# Patient Record
Sex: FEMALE | Race: WHITE | Employment: PART TIME | URBAN - METROPOLITAN AREA
[De-identification: names, ages, dates, MRNs, and addresses within clinical notes are randomized per-mention and may not be internally consistent; named-entity substitution may affect disease eponyms.]

---

## 2017-01-08 NOTE — PROGRESS NOTES
[facility] Progress Note      Patient Name: Sofya Bautista   YOB: 1970  Medical Record Number: [id number]  Consulting Physician: Vladimir Mcwilliams M.D.        Date of Visit: 1/9/2017      Chief Complaint  Invasive ductal carcinoma, ri severe depression that began with the start of tamoxifen. History of Present Illness  Patient returns for follow up. She states that within two days of discontinuing tamoxifen, her mood improved dramatically.  She feels that she has returned to her base x 3; motor and sensory grossly intact. Psychiatric  Mood and affect appropriate; coherent speech; verbalizes understanding of our discussions today. Impression and Plan   1. Invasive ductal carcinoma, right breast: Patient is staged [id number].  Ravi

## 2017-01-09 ENCOUNTER — OFFICE VISIT (OUTPATIENT)
Dept: HEMATOLOGY/ONCOLOGY | Facility: HOSPITAL | Age: 47
End: 2017-01-09
Attending: SPECIALIST
Payer: COMMERCIAL

## 2017-01-09 VITALS
DIASTOLIC BLOOD PRESSURE: 56 MMHG | BODY MASS INDEX: 22.91 KG/M2 | RESPIRATION RATE: 18 BRPM | WEIGHT: 151.19 LBS | SYSTOLIC BLOOD PRESSURE: 111 MMHG | OXYGEN SATURATION: 99 % | TEMPERATURE: 98 F | HEIGHT: 67.99 IN | HEART RATE: 77 BPM

## 2017-01-09 DIAGNOSIS — C50.811 MALIGNANT NEOPLASM OF OVERLAPPING SITES OF RIGHT FEMALE BREAST (HCC): Primary | ICD-10-CM

## 2017-01-09 PROCEDURE — 99215 OFFICE O/P EST HI 40 MIN: CPT | Performed by: SPECIALIST

## 2017-01-09 NOTE — PROGRESS NOTES
Patient is here today for follow up with Dr. Larry Dudley. Patient denies pain. Stated depression is much better since stopping Tamoxifen. Medication list and medical history were reviewed and updated.       Education Record    Learner:  Patient    Disease / Mariaelena

## 2017-04-16 NOTE — PROGRESS NOTES
[facility] Progress Note      Patient Name: Willian Coppola   YOB: 1970  Medical Record Number: [id number]  Consulting Physician: Fabian Arellano M.D.        Date of Visit: 4/17/2017      Chief Complaint  Invasive ductal carcinoma, r to severe depression that began with the start of tamoxifen. History of Present Illness  Patient returns for follow up. She feels well. She denies recurrence of the severe depression that resulted in discontinuation of tamoxifen.  She denies any self pa External nose normal; external ears normal.  Neck                   Supple, without masses; no thyromegaly. Hematologic/Lymphatic No cervical, supraclavicular, axillary lymphadenopathy. Chest                         Symmetric; no masses.   Br

## 2017-04-17 ENCOUNTER — OFFICE VISIT (OUTPATIENT)
Dept: HEMATOLOGY/ONCOLOGY | Facility: HOSPITAL | Age: 47
End: 2017-04-17
Attending: SPECIALIST
Payer: COMMERCIAL

## 2017-04-17 VITALS
HEIGHT: 67.99 IN | SYSTOLIC BLOOD PRESSURE: 120 MMHG | RESPIRATION RATE: 18 BRPM | HEART RATE: 69 BPM | DIASTOLIC BLOOD PRESSURE: 72 MMHG | WEIGHT: 148.5 LBS | TEMPERATURE: 97 F | BODY MASS INDEX: 22.51 KG/M2 | OXYGEN SATURATION: 98 %

## 2017-04-17 DIAGNOSIS — C50.811 MALIGNANT NEOPLASM OF OVERLAPPING SITES OF RIGHT FEMALE BREAST (HCC): Primary | ICD-10-CM

## 2017-04-17 PROCEDURE — 99213 OFFICE O/P EST LOW 20 MIN: CPT | Performed by: SPECIALIST

## 2017-04-17 NOTE — PROGRESS NOTES
Patient is here today for follow up with Bryce Check for breast cancer. Patient denies pain. Medication list and medical history were reviewed and updated.     Education Record    Learner:  Patient    Disease / Diagnosis: breast cancer    Francisco / Jodene Nyhan

## 2017-07-16 NOTE — PROGRESS NOTES
[facility] Progress Note      Patient Name: Ramona Sellers   YOB: 1970  Medical Record Number: [id number]  Consulting Physician: Naya Cunningham M.D.        Date of Visit: 7/17/2017      Chief Complaint  Invasive ductal carcinoma, r to severe depression that began with the start of tamoxifen. History of Present Illness  Patient returns for follow up. She feels well. She denies recurrence of the severe depression that resulted in discontinuation of tamoxifen.  She denies any self pa anicteric. ENMT                 External nose normal; external ears normal.  Neck                   Supple, without masses; no thyromegaly. Hematologic/Lymphatic No cervical, supraclavicular, axillary lymphadenopathy.   Chest                         Symme

## 2017-07-17 ENCOUNTER — OFFICE VISIT (OUTPATIENT)
Dept: HEMATOLOGY/ONCOLOGY | Facility: HOSPITAL | Age: 47
End: 2017-07-17
Attending: SPECIALIST
Payer: COMMERCIAL

## 2017-07-17 VITALS
TEMPERATURE: 96 F | OXYGEN SATURATION: 98 % | SYSTOLIC BLOOD PRESSURE: 108 MMHG | HEIGHT: 67.99 IN | HEART RATE: 59 BPM | DIASTOLIC BLOOD PRESSURE: 62 MMHG | WEIGHT: 150.19 LBS | RESPIRATION RATE: 18 BRPM | BODY MASS INDEX: 22.76 KG/M2

## 2017-07-17 DIAGNOSIS — Z17.0 MALIGNANT NEOPLASM OF OVERLAPPING SITES OF RIGHT BREAST IN FEMALE, ESTROGEN RECEPTOR POSITIVE (HCC): ICD-10-CM

## 2017-07-17 DIAGNOSIS — C50.811 MALIGNANT NEOPLASM OF OVERLAPPING SITES OF RIGHT BREAST IN FEMALE, ESTROGEN RECEPTOR POSITIVE (HCC): ICD-10-CM

## 2017-07-17 DIAGNOSIS — I89.0 LYMPHEDEMA OF RIGHT ARM: Primary | ICD-10-CM

## 2017-07-17 PROCEDURE — 99215 OFFICE O/P EST HI 40 MIN: CPT | Performed by: SPECIALIST

## 2017-07-17 NOTE — PROGRESS NOTES
Patient is here today for follow up with Dr Gokul Rivera for breast cancer. Patient denies pain. Medication list and medical history were reviewed and updated.     Education Record    Learner:  Patient    Disease / Diagnosis: breast cancer    Francisco / Carla Luis

## 2017-08-28 ENCOUNTER — HOSPITAL ENCOUNTER (OUTPATIENT)
Dept: OCCUPATIONAL MEDICINE | Facility: HOSPITAL | Age: 47
Setting detail: THERAPIES SERIES
Discharge: HOME | End: 2017-08-28
Attending: SPECIALIST
Payer: COMMERCIAL

## 2017-08-28 DIAGNOSIS — I97.2 LYMPHEDEMA SYNDROME, POSTMASTECTOMY: Primary | ICD-10-CM

## 2017-08-28 PROCEDURE — 97166 OT EVAL MOD COMPLEX 45 MIN: CPT

## 2017-08-28 PROCEDURE — 97535 SELF CARE MNGMENT TRAINING: CPT

## 2017-08-28 NOTE — PROGRESS NOTES
OCCUPATIONAL THERAPY UE LYMPHEDEMA EVALUATION:   Referring Physician: Dr. Antonio Miner  Diagnosis: Lymphedema of right arm (I89.0) Date of Service: 8/28/2017     PATIENT Maxi Paige is a 56year old y/o female who presents s/p bilateral mastecto of response to use of compression sleeve; instruction in self-manual lymph drainage techniques for home self-management of her lymphedema.  At today's session, she was provided with an authorized prescription for a compression sleeve; information on where t of lymphedema risk reduction techniques. 3 sessions   3.  Pt will obtain good fitting Right UE compression sleeve and demonstrate good understanding of use, care and replacement of garment/s. 3 sessions      Frequency / Duration: Patient to return on 9/13 a

## 2017-09-13 ENCOUNTER — HOSPITAL ENCOUNTER (OUTPATIENT)
Dept: OCCUPATIONAL MEDICINE | Facility: HOSPITAL | Age: 47
Setting detail: THERAPIES SERIES
Discharge: HOME | End: 2017-09-13
Attending: SPECIALIST
Payer: COMMERCIAL

## 2017-09-13 PROCEDURE — 97140 MANUAL THERAPY 1/> REGIONS: CPT

## 2017-09-13 PROCEDURE — 97535 SELF CARE MNGMENT TRAINING: CPT

## 2017-09-13 PROCEDURE — 97760 ORTHOTIC MGMT&TRAING 1ST ENC: CPT

## 2017-09-13 NOTE — PROGRESS NOTES
Dx: Lymphedema of right arm (I89.0)         Authorized # of Visits:  2/3        Next MD visit/plan renewal:  9/28/2017  Fall Risk: Standard          Precautions:  Lymphedema        Subjective:   *Pt reports she obtained and used Right compression sleeve fo sleeve while walking her dogs. Advised her to use self-reflection if discomfort occurs in her Right UE to determine potential source of exacerbation as well as to think pro-actively about whether she should be using her sleeve for an IADL.  Pt acknowledging

## 2017-09-27 ENCOUNTER — HOSPITAL ENCOUNTER (OUTPATIENT)
Dept: OCCUPATIONAL MEDICINE | Facility: HOSPITAL | Age: 47
Setting detail: THERAPIES SERIES
Discharge: HOME | End: 2017-09-27
Attending: SPECIALIST
Payer: COMMERCIAL

## 2017-09-27 PROCEDURE — 97140 MANUAL THERAPY 1/> REGIONS: CPT

## 2017-09-27 NOTE — PROGRESS NOTES
Discharge Summary    Pt has attended 3/3, cancelled 0, and no shown 0 visits in Occupational Therapy from 8/28-9/27/2017. Subjective:   Pt reports resolution of Right axillary/UE \"achiness\" following exercise activities.  Since last seen, reports she risk reduction, discussed that pt's current approach is appropriate at this time; she states she is not planning to return to classes that involve heavy weights.  Reviewed signs/symptoms of lymphedema   GARMENT SPECIFICS:  Mediven [place], 20-30 mmHg estella

## 2017-12-27 PROCEDURE — 87253 VIRUS INOCULATE TISSUE ADDL: CPT | Performed by: FAMILY MEDICINE

## 2017-12-27 PROCEDURE — 87660 TRICHOMONAS VAGIN DIR PROBE: CPT | Performed by: FAMILY MEDICINE

## 2017-12-27 PROCEDURE — 87252 VIRUS INOCULATION TISSUE: CPT | Performed by: FAMILY MEDICINE

## 2017-12-27 PROCEDURE — 87480 CANDIDA DNA DIR PROBE: CPT | Performed by: FAMILY MEDICINE

## 2017-12-27 PROCEDURE — 87510 GARDNER VAG DNA DIR PROBE: CPT | Performed by: FAMILY MEDICINE

## 2018-08-23 PROBLEM — F41.1 ANXIETY STATE: Status: ACTIVE | Noted: 2018-08-23

## 2018-09-20 PROBLEM — G47.00 INSOMNIA: Status: ACTIVE | Noted: 2018-09-20

## 2019-01-31 ENCOUNTER — SOCIAL WORK SERVICES (OUTPATIENT)
Dept: HEMATOLOGY/ONCOLOGY | Facility: HOSPITAL | Age: 49
End: 2019-01-31

## 2019-01-31 NOTE — PROGRESS NOTES
Patient is here today for follow up with [facility] for breast cancer. Patient denies pain. Medication list and medical history were reviewed and updated.      Education Record    Learner:  Patient    Disease / Diagnosis: breast cancer    Barriers / Bekraig Chi

## 2019-01-31 NOTE — PROGRESS NOTES
ZACH met with patient at the request of MD.  Patient is experiencing depression while taking medication prescribed by PCP and psychiatric APN.     She relates that she has a history of depression and nothing she has been taking has improved her mood to a poin

## 2019-01-31 NOTE — PROGRESS NOTES
[facility] Progress Note      Patient Name: Brian Summers   YOB: 1970  Medical Record Number: [id number]  Consulting Physician: Dereck Hernandes M.D.        Date of Visit: 1/31/2019      Chief Complaint  Invasive ductal carcinoma, r to severe depression that began with the start of tamoxifen. Patient failed to follow up between 07/2017 and 01/2019. History of Present Illness  Patient returns for follow up. She feels well. She denies any self palpated chest wall masses.  She has 22.88 kg/m²     Physical Examination   Constitutional      Well developed, well nourished. Appears close to chronological age. No apparent distress. Head   Normocephalic and atraumatic. Eyes   Conjunctiva clear; sclera anicteric.   ENMT                 E

## 2019-02-12 ENCOUNTER — SOCIAL WORK SERVICES (OUTPATIENT)
Dept: HEMATOLOGY/ONCOLOGY | Facility: HOSPITAL | Age: 49
End: 2019-02-12

## 2019-02-13 ENCOUNTER — SOCIAL WORK SERVICES (OUTPATIENT)
Dept: HEMATOLOGY/ONCOLOGY | Facility: HOSPITAL | Age: 49
End: 2019-02-13

## 2019-02-13 NOTE — PROGRESS NOTES
Patient left  for ZACH at [facility] 2/12 stating that she would try to reach SW tomorrow. ZACH will give her name and contact info for [facility] when we are able to speak.

## 2019-04-23 ENCOUNTER — HOSPITAL ENCOUNTER (OUTPATIENT)
Dept: GENERAL RADIOLOGY | Facility: HOSPITAL | Age: 49
Discharge: HOME | End: 2019-04-23
Attending: INTERNAL MEDICINE
Payer: COMMERCIAL

## 2019-04-23 ENCOUNTER — LAB ENCOUNTER (OUTPATIENT)
Dept: LAB | Facility: HOSPITAL | Age: 49
End: 2019-04-23
Attending: INTERNAL MEDICINE
Payer: COMMERCIAL

## 2019-04-23 DIAGNOSIS — M54.50 CHRONIC MIDLINE LOW BACK PAIN WITHOUT SCIATICA: ICD-10-CM

## 2019-04-23 DIAGNOSIS — F41.0 PANIC ATTACK: ICD-10-CM

## 2019-04-23 DIAGNOSIS — G89.29 CHRONIC MIDLINE LOW BACK PAIN WITHOUT SCIATICA: ICD-10-CM

## 2019-04-23 PROBLEM — F39 MOOD DISORDER (HCC): Status: ACTIVE | Noted: 2019-04-23

## 2019-04-23 PROBLEM — E78.2 MIXED HYPERLIPIDEMIA: Status: ACTIVE | Noted: 2019-04-23

## 2019-04-23 PROBLEM — R79.89 HIGH SERUM LOW-DENSITY LIPOPROTEIN (LDL): Status: ACTIVE | Noted: 2019-04-23

## 2019-04-23 PROBLEM — F39 MOOD DISORDER: Status: ACTIVE | Noted: 2019-04-23

## 2019-04-23 PROCEDURE — 72114 X-RAY EXAM L-S SPINE BENDING: CPT | Performed by: INTERNAL MEDICINE

## 2019-04-23 PROCEDURE — 84443 ASSAY THYROID STIM HORMONE: CPT

## 2019-04-23 PROCEDURE — 84439 ASSAY OF FREE THYROXINE: CPT

## 2019-04-23 PROCEDURE — 84450 TRANSFERASE (AST) (SGOT): CPT

## 2019-04-23 PROCEDURE — 85025 COMPLETE CBC W/AUTO DIFF WBC: CPT

## 2019-04-23 PROCEDURE — 80061 LIPID PANEL: CPT

## 2019-04-23 PROCEDURE — 80048 BASIC METABOLIC PNL TOTAL CA: CPT

## 2019-04-23 PROCEDURE — 84460 ALANINE AMINO (ALT) (SGPT): CPT

## 2019-04-23 PROCEDURE — 36415 COLL VENOUS BLD VENIPUNCTURE: CPT

## 2019-04-23 NOTE — PROGRESS NOTES
Hudson Disla is a 53year old female. HPI:   Patient presents for the following issues. We will re-scheudle her CPX. 1. MDD and Anxiety: she was following with Archana Marques (psychiatrist [facility]).  She had been provided with other psychiatrists but she daily a few miles. None of this helps her pain. pilates has helped a little. 3. Umbilical Hernia: repaired in 2014 by Hermann Smith. She decliend mesh at that time. It has become progressvely painful and is poking out. She wants to return to see Hermann Smith.        REV Performed by Erick Ramos MD at [address] [facility]   • GRAFTING FAT BREAST Bilateral 9/30/2016    Performed by Erick Ramos MD at [facility] N/A 3/5/2014    Performed by Clarence Caceres and then try to decrease to every other day prn. She does not want to be on long term benzos but may need to wait until psychiatry is able to help with medication management.   - I will temporarily refill her medications if her labs are normal.   Sarah Silva

## 2019-04-23 NOTE — PATIENT INSTRUCTIONS
[address] [facility] [place] Specialist is Jace Gurwinder. Phone: [phone]. Please reach out to her if you do not hear from her in the next 3 days.

## 2019-04-24 PROBLEM — F12.90 MARIJUANA USE, EPISODIC: Status: ACTIVE | Noted: 2019-04-24

## 2019-04-24 NOTE — TELEPHONE ENCOUNTER
Patient was in for [address] yesterday and was instructed to complete blood work prior to getting refill for medication. Requesting refill for:  escitalopram 10 MG Oral Tab  Lurasidone HCl (LATUDA) 20 MG Oral Tab    [facility] - [address] [place] [address] -  KAIDEN Carmen

## 2019-04-25 RX ORDER — ESCITALOPRAM OXALATE 10 MG/1
10 TABLET ORAL DAILY
Refills: 0 | OUTPATIENT
Start: 2019-04-25

## 2019-04-25 NOTE — TELEPHONE ENCOUNTER
Latuda 20 mg 1 tab daily filled 12/20/18 30 with 0 refills     Escitalopram 10 mg 1 tab daily filled 12/20/18 30 with 2 refills     LOV 4/23/19    return in 6 weeks for CPX. # Mood Disorder and Panic attacks: not controlled.  ran out of lexapro 3 weeks a

## 2019-04-26 RX ORDER — CLONAZEPAM 0.5 MG/1
0.5 TABLET ORAL NIGHTLY PRN
Qty: 30 TABLET | Refills: 0 | Status: SHIPPED | OUTPATIENT
Start: 2019-04-26 | End: 2019-07-22

## 2019-04-26 RX ORDER — ESCITALOPRAM OXALATE 10 MG/1
10 TABLET ORAL DAILY
Qty: 30 TABLET | Refills: 1 | Status: SHIPPED | OUTPATIENT
Start: 2019-04-26 | End: 2019-07-08

## 2019-05-07 ENCOUNTER — OFFICE VISIT (OUTPATIENT)
Dept: SURGERY | Facility: CLINIC | Age: 49
End: 2019-05-07
Payer: COMMERCIAL

## 2019-05-07 VITALS
RESPIRATION RATE: 20 BRPM | BODY MASS INDEX: 22.58 KG/M2 | WEIGHT: 149 LBS | TEMPERATURE: 98 F | HEIGHT: 68 IN | SYSTOLIC BLOOD PRESSURE: 117 MMHG | HEART RATE: 74 BPM | DIASTOLIC BLOOD PRESSURE: 76 MMHG

## 2019-05-07 DIAGNOSIS — K42.9 UMBILICAL HERNIA WITHOUT OBSTRUCTION AND WITHOUT GANGRENE: Primary | ICD-10-CM

## 2019-05-07 DIAGNOSIS — K43.2 RECURRENT VENTRAL HERNIA: ICD-10-CM

## 2019-05-07 PROCEDURE — 99244 OFF/OP CNSLTJ NEW/EST MOD 40: CPT | Performed by: SURGERY

## 2019-05-08 NOTE — PROGRESS NOTES
Follow Up Visit Note       Active Problems      1. Umbilical hernia without obstruction and without gangrene    2.  Ventral hernia without obstruction or gangrene          Chief Complaint   Patient presents with:  Hernia: New patient referred by Dr. Wilver Huynh fo •  escitalopram 10 MG Oral Tab, Take 1 tablet (10 mg total) by mouth daily. , Disp: 30 tablet, Rfl: 1  •  Lurasidone HCl (LATUDA) 20 MG Oral Tab, Take 1 tablet (20 mg total) by mouth daily with breakfast., Disp: 30 tablet, Rfl: 1  •  clonazePAM 0.5 MG Oral Musculoskeletal: Normal range of motion. She exhibits no deformity. Neurological: She is alert and oriented to person, place, and time. Skin: Skin is warm and dry. No rash noted. The abdomen is soft, nondistended, nontender.   Well-healed supraumbil

## 2019-06-19 PROBLEM — F41.1 GENERALIZED ANXIETY DISORDER: Status: ACTIVE | Noted: 2019-06-19

## 2019-07-12 RX ORDER — LURASIDONE HYDROCHLORIDE 20 MG/1
TABLET, FILM COATED ORAL
Qty: 30 TABLET | Refills: 0 | Status: SHIPPED | OUTPATIENT
Start: 2019-07-12 | End: 2019-07-25

## 2019-07-12 RX ORDER — ESCITALOPRAM OXALATE 10 MG/1
10 TABLET ORAL DAILY
Qty: 30 TABLET | Refills: 0 | Status: SHIPPED | OUTPATIENT
Start: 2019-07-12 | End: 2019-07-25

## 2019-07-12 NOTE — TELEPHONE ENCOUNTER
escitalopram  Last OV relevant to medication: 4-23-19  Last refill date: 4-26-19  #/refills: 1  When pt was asked to return for OV: 6 Wks  CPX  Upcoming appt/reason: 7-22-19  Recent labs: none    Latuda  Last OV relevant to medication: 4-23-19  Last refill

## 2019-07-22 ENCOUNTER — OFFICE VISIT (OUTPATIENT)
Dept: INTERNAL MEDICINE CLINIC | Facility: CLINIC | Age: 49
End: 2019-07-22
Payer: COMMERCIAL

## 2019-07-22 VITALS
WEIGHT: 147.5 LBS | OXYGEN SATURATION: 99 % | HEIGHT: 67.75 IN | SYSTOLIC BLOOD PRESSURE: 128 MMHG | BODY MASS INDEX: 22.61 KG/M2 | DIASTOLIC BLOOD PRESSURE: 64 MMHG | RESPIRATION RATE: 16 BRPM | HEART RATE: 75 BPM | TEMPERATURE: 98 F

## 2019-07-22 DIAGNOSIS — M54.50 ACUTE MIDLINE LOW BACK PAIN WITHOUT SCIATICA: ICD-10-CM

## 2019-07-22 DIAGNOSIS — Z12.4 SCREENING FOR CERVICAL CANCER: ICD-10-CM

## 2019-07-22 DIAGNOSIS — Z00.00 ROUTINE GENERAL MEDICAL EXAMINATION AT A HEALTH CARE FACILITY: Primary | ICD-10-CM

## 2019-07-22 DIAGNOSIS — M51.36 DEGENERATIVE DISC DISEASE, LUMBAR: ICD-10-CM

## 2019-07-22 PROCEDURE — 87624 HPV HI-RISK TYP POOLED RSLT: CPT | Performed by: INTERNAL MEDICINE

## 2019-07-22 PROCEDURE — 99396 PREV VISIT EST AGE 40-64: CPT | Performed by: INTERNAL MEDICINE

## 2019-07-22 RX ORDER — MULTIVITAMIN
1 TABLET ORAL DAILY
COMMUNITY
End: 2021-12-21

## 2019-07-22 NOTE — PATIENT INSTRUCTIONS
Resource: [facility] need 3 eight ounce servings of calcium/vitamin D daily. This includes spinach, kale, broccoli, almonds, milk, yogurt, or cottage cheese. I advise you get the annual flu shot every September, October.

## 2019-07-22 NOTE — PROGRESS NOTES
Ghassan Sow is a 51year old female. HPI:   Patient presents for CPX. 1. Mood Disorder: very stable on lexapro and latuda. She was able to wean herself off clonopin for 1 month. She is now using hydroxyzine every night to help her sleep.  Not using Date   • Anxiety state, unspecified    • Breast cancer ([facility] [address]    • Low back pain       Past Surgical History:   Procedure Laterality Date   • BREAST IMPLANT REMOVAL,CAPSULOTOMY BILATERAL Bilateral 9/30/2016    Performed by Mehrdad Pandya MD at [address] [place] [address] appearing cervix. ASSESSMENT AND PLAN:   # Mood Disorder and Panic attacks: doing very well on latuda and lexapro. Applauded on weaning herself off clonopin. - she is following with psychiatry.  Has access/resources for therapist but does not feel she

## 2019-07-25 LAB — HPV I/H RISK 1 DNA SPEC QL NAA+PROBE: NEGATIVE

## 2019-08-15 ENCOUNTER — APPOINTMENT (OUTPATIENT)
Dept: HEMATOLOGY/ONCOLOGY | Facility: HOSPITAL | Age: 49
End: 2019-08-15
Payer: COMMERCIAL

## 2019-08-16 NOTE — PROGRESS NOTES
Patient is here today for follow up with Ariel Schwab for breast cancer. Patient denies pain. Stated is feeling ok. Medication list and medical history were reviewed and updated.      Education Record    Learner:  Patient    Disease / Diagnosis: Breast Cancer

## 2019-08-16 NOTE — PROGRESS NOTES
[facility] Progress Note      Patient Name: Marlen Strauss   YOB: 1970  Medical Record Number: [id number]  Consulting Physician: Anai Lima M.D.        Date of Visit: 8/16/2019      Chief Complaint  Invasive ductal carcinoma, r to severe depression that began with the start of tamoxifen. Patient failed to follow up between 07/2017 and 01/2019. History of Present Illness  Patient returns for follow up. She feels well. She denies any self palpated chest wall masses.  She has pain.  Psychiatric  Mood improved.      Vital Signs   /69 (BP Location: Left arm, Patient Position: Sitting, Cuff Size: adult)   Pulse 66   Temp 98.5 °F (36.9 °C) (Tympanic)   Resp 12   Ht 1.721 m (5' 7.76\")   Wt 67.4 kg (148 lb 8 oz)   SpO2 99%   BM of Hematology/Oncology, [address] [facility]

## 2019-09-24 PROBLEM — F33.1 MAJOR DEPRESSIVE DISORDER, RECURRENT EPISODE, MODERATE (HCC): Status: ACTIVE | Noted: 2019-09-24

## 2019-10-01 ENCOUNTER — OFFICE VISIT (OUTPATIENT)
Dept: SURGERY | Facility: CLINIC | Age: 49
End: 2019-10-01
Payer: COMMERCIAL

## 2019-10-01 VITALS
HEIGHT: 68 IN | TEMPERATURE: 98 F | DIASTOLIC BLOOD PRESSURE: 74 MMHG | SYSTOLIC BLOOD PRESSURE: 114 MMHG | BODY MASS INDEX: 22.73 KG/M2 | WEIGHT: 150 LBS | HEART RATE: 75 BPM

## 2019-10-01 DIAGNOSIS — K42.9 UMBILICAL HERNIA WITHOUT OBSTRUCTION AND WITHOUT GANGRENE: Primary | ICD-10-CM

## 2019-10-01 PROBLEM — K43.9 VENTRAL HERNIA WITHOUT OBSTRUCTION OR GANGRENE: Status: ACTIVE | Noted: 2019-10-01

## 2019-10-01 PROCEDURE — 99214 OFFICE O/P EST MOD 30 MIN: CPT | Performed by: SURGERY

## 2019-10-01 NOTE — PROGRESS NOTES
Follow Up Visit Note       Active Problems      1.  Umbilical hernia without obstruction and without gangrene          Chief Complaint   Patient presents with:  Hernia: est patient umbilical hernia, here for recurrent hernia denies pain,      Allergies  Car Oral Tab, Take 1 tablet (25 mg total) by mouth 3 (three) times daily as needed for Anxiety. , Disp: 90 tablet, Rfl: 1  •  Lurasidone HCl (LATUDA) 20 MG Oral Tab, TAKE 1 TABLET (20 MG TOTAL) BY MOUTH DAILY WITH BREAKFAST., Disp: 30 tablet, Rfl: 1  •  Multipl Illness    Today, I am seeing this patient for follow up of her supraumbilical ventral  hernia.      67-year-old female who is here today for evaluation for ventral hernia  The patient did undergo an umbilical herniorrhaphy in March 2014 by myself  At that have discussed that she would have activity modifications for 4 to 6 weeks. All questions were answered. The patient agrees with this plan.     Thank you for allowing me to participate in your patient's care       No orders of the defined types were vernon

## 2019-10-22 PROBLEM — Z90.13 ACQUIRED ABSENCE OF BILATERAL BREASTS AND NIPPLES: Status: ACTIVE | Noted: 2019-10-22

## 2019-10-22 PROBLEM — Z98.890 HISTORY OF RECONSTRUCTION OF BOTH BREASTS: Status: ACTIVE | Noted: 2019-10-22

## 2020-02-05 PROBLEM — F06.30 MOOD DISORDER IN CONDITIONS CLASSIFIED ELSEWHERE: Status: ACTIVE | Noted: 2019-04-23

## 2020-02-12 ENCOUNTER — OFFICE VISIT (OUTPATIENT)
Dept: SURGERY | Facility: CLINIC | Age: 50
End: 2020-02-12
Payer: COMMERCIAL

## 2020-02-12 VITALS — WEIGHT: 154 LBS | HEIGHT: 68 IN | BODY MASS INDEX: 23.34 KG/M2

## 2020-02-12 DIAGNOSIS — Z90.13 ACQUIRED ABSENCE OF BILATERAL BREASTS AND NIPPLES: Primary | ICD-10-CM

## 2020-02-12 PROCEDURE — 99243 OFF/OP CNSLTJ NEW/EST LOW 30: CPT | Performed by: SURGERY

## 2020-02-12 NOTE — PATIENT INSTRUCTIONS
Surgeon:              Dr. Thao Henley.  Dru Velázquez, PhD                                          Tel:         [phone]                                  Fax:        [phone]    Surgery/Procedure:    Bilateral implant exchange, capsulotomy, capsulorrhaphy, exci

## 2020-02-12 NOTE — CONSULTS
New Patient Consultation    Chief Complaint:  Patient presents with:  Consult: implkant recall       History of Present Illness:   Merrick العلي is a 51year old female with h o of R breast cancer s/p BL mastectomy (R SSM, L NSM) and TE placement followed facility-administered medications on file prior to visit.          Allergies:    No Known Allergies      Family History:   Family History   Problem Relation Age of Onset   • Lipids Father    • Heart Disorder Maternal Grandmother    • Other (Other) Maternal cough, phlegm, asthma, or wheezing  Cardiovascular:   The patient denies chest pain/pressure, palpitations, irregular heartbeat, high blood pressure, stroke, or leg swelling  Breasts:  Patient denies breast masses, pain, change in the breast skin, skin dimp Psychiatric: Affect is appropriate. Eyes: Conjunctiva are clear, non-icteric.  PERRL    ENT: no obvious abnormality, no ear drainage, mucous membranes moist and pink    Integument/Skin: The skin appears normal. There are no suspicious appearing rashe to infection, bleeding, scarring, damage to surrounding structures, such as nerves, blood vessels, muscles,  tendons, risk of hematoma, seroma, capsular contracture, implant rupture, ESTEPHANIE-ALCL,  foreign body reaction, allergic reaction, wound dehiscences, d

## 2020-02-12 NOTE — PROGRESS NOTES
Surgery and wash instructions verbally reviewed with the patient and written instructions were also provided. The patient understands the need to obtain medical clearance for this procedure and plans to see Dr. Mihir Johansen for this.      Informed consent for the p

## 2020-02-13 NOTE — PROGRESS NOTES
[facility] Progress Note      Patient Name: Elida Man   YOB: 1970  Medical Record Number: [id number]  Consulting Physician: Jennifer Colby M.D.        Date of Visit: 2/14/2020      Chief Complaint  Invasive ductal 12/2016 due to severe depression that began with the start of tamoxifen. Patient failed to follow up between 07/2017 and 01/2019. History of Present Illness  Patient returns for follow up. She feels well.  She denies any self palpated chest wall mas arm, Patient Position: Sitting, Cuff Size: adult)   Pulse 79   Temp 97.9 °F (36.6 °C) (Tympanic)   Resp 16   Ht 1.721 m (5' 7.76\")   Wt 70.8 kg (156 lb)   SpO2 97%   BMI 23.89 kg/m²     Physical Examination   Constitutional      Well developed, well rj

## 2020-02-14 ENCOUNTER — OFFICE VISIT (OUTPATIENT)
Dept: HEMATOLOGY/ONCOLOGY | Facility: HOSPITAL | Age: 50
End: 2020-02-14
Attending: SPECIALIST
Payer: COMMERCIAL

## 2020-02-14 VITALS
TEMPERATURE: 98 F | HEIGHT: 67.76 IN | OXYGEN SATURATION: 97 % | HEART RATE: 79 BPM | SYSTOLIC BLOOD PRESSURE: 114 MMHG | BODY MASS INDEX: 23.92 KG/M2 | RESPIRATION RATE: 16 BRPM | WEIGHT: 156 LBS | DIASTOLIC BLOOD PRESSURE: 72 MMHG

## 2020-02-14 DIAGNOSIS — C50.811 MALIGNANT NEOPLASM OF OVERLAPPING SITES OF RIGHT BREAST IN FEMALE, ESTROGEN RECEPTOR POSITIVE (HCC): Primary | ICD-10-CM

## 2020-02-14 DIAGNOSIS — Z17.0 MALIGNANT NEOPLASM OF OVERLAPPING SITES OF RIGHT BREAST IN FEMALE, ESTROGEN RECEPTOR POSITIVE (HCC): Primary | ICD-10-CM

## 2020-02-14 PROCEDURE — 99213 OFFICE O/P EST LOW 20 MIN: CPT | Performed by: SPECIALIST

## 2020-04-06 ENCOUNTER — TELEPHONE (OUTPATIENT)
Dept: SURGERY | Facility: CLINIC | Age: 50
End: 2020-04-06

## 2020-04-06 NOTE — TELEPHONE ENCOUNTER
Returned pt's call regarding scheduling with Dr. Juan Singh, I did apologize that I was not able to call her but did inform her that a my chart message was sent with a letter stating why we would not be reaching out to schedule at this time.  Pt was very [employer]

## 2020-04-21 ENCOUNTER — TELEPHONE (OUTPATIENT)
Dept: SURGERY | Facility: CLINIC | Age: 50
End: 2020-04-21

## 2020-05-05 PROBLEM — F31.62 BIPOLAR DISORDER, CURRENT EPISODE MIXED, MODERATE (HCC): Status: ACTIVE | Noted: 2020-05-05

## 2020-05-11 ENCOUNTER — PATIENT MESSAGE (OUTPATIENT)
Dept: INTERNAL MEDICINE CLINIC | Facility: CLINIC | Age: 50
End: 2020-05-11

## 2020-05-11 NOTE — TELEPHONE ENCOUNTER
From: Lary Cheema  To: Osbaldo Jean MD  Sent: 5/11/2020 10:47 AM CDT  Subject: Non-Urgent Medical Question    Dr. Lennette Bosworth,  I have been experiencing severe neck pain. Today is day three.  I can't move my neck in certain directions and it's very difficult to

## 2020-05-11 NOTE — TELEPHONE ENCOUNTER
Appointment scheduled    Future Appointments   Date Time Provider Princess Mendez   5/12/2020  9:00 AM DEANDRE Mendez [facility] [place]

## 2020-05-12 ENCOUNTER — TELEMEDICINE (OUTPATIENT)
Dept: INTERNAL MEDICINE CLINIC | Facility: CLINIC | Age: 50
End: 2020-05-12
Payer: COMMERCIAL

## 2020-05-12 ENCOUNTER — TELEPHONE (OUTPATIENT)
Dept: SURGERY | Facility: CLINIC | Age: 50
End: 2020-05-12

## 2020-05-12 DIAGNOSIS — M54.2 NECK PAIN ON LEFT SIDE: Primary | ICD-10-CM

## 2020-05-12 DIAGNOSIS — Z90.13 STATUS POST BILATERAL MASTECTOMY: Primary | ICD-10-CM

## 2020-05-12 PROCEDURE — 99214 OFFICE O/P EST MOD 30 MIN: CPT | Performed by: PHYSICIAN ASSISTANT

## 2020-05-12 RX ORDER — PREDNISONE 10 MG/1
30 TABLET ORAL DAILY
Qty: 15 TABLET | Refills: 0 | Status: SHIPPED | OUTPATIENT
Start: 2020-05-12 | End: 2020-05-17

## 2020-05-12 RX ORDER — CYCLOBENZAPRINE HCL 10 MG
10 TABLET ORAL 3 TIMES DAILY PRN
Qty: 15 TABLET | Refills: 0 | Status: SHIPPED | OUTPATIENT
Start: 2020-05-12 | End: 2020-05-17

## 2020-05-12 NOTE — PROGRESS NOTES
Virtual Video Check-In     This visit is conducted using Telemedicine with live, interactive video and audio. Jarod Patricepastora, who has verified his/her identification by name and , verbally consents to a Virtual/Telephone Check-In visit on 20. • GRAFTING FAT BREAST N/A 1/13/2017    Performed by José Antonio Naranjo MD at [facility]   • GRAFTING FAT BREAST Bilateral 9/30/2016    Performed by José Antonio Naranjo MD at [facility] N/A 3/ mg TID PRN. Send HEP via [address] Rec ice/heat. The patient indicates understanding of these issues and agrees to the plan. The patient is asked to return here this summer for wellness visit.     *Please note that the following visit was completed usi

## 2020-05-12 NOTE — PATIENT INSTRUCTIONS
Neck pain:  - start prednisone 10 mg - take 3 tablets once a day with food x 5 days  - may use tylenol 1,000 mg every 8 hours as needed  - ice / heat (10-15 minutes at a time)  - start home exercises / stretches:    [email or web address]

## 2020-05-12 NOTE — TELEPHONE ENCOUNTER
Called patient to schedule her joint case with Dr. Zunilda Art and Dr. Rufus Blandon. Pt confirmed 8/17/2020 at [facility] [address] the pre op clearance that is needed and pt verbalized understanding. Pt was appreciative of the call.

## 2020-05-15 ENCOUNTER — TELEPHONE (OUTPATIENT)
Dept: SURGERY | Facility: CLINIC | Age: 50
End: 2020-05-15

## 2020-05-15 DIAGNOSIS — K43.9 VENTRAL HERNIA WITHOUT OBSTRUCTION OR GANGRENE: Primary | ICD-10-CM

## 2020-06-16 ENCOUNTER — TELEPHONE (OUTPATIENT)
Dept: INTERNAL MEDICINE CLINIC | Facility: CLINIC | Age: 50
End: 2020-06-16

## 2020-06-17 NOTE — TELEPHONE ENCOUNTER
Patient called stating she developed severe sunburn over the weekend, now areas of sunburn are starting to blister and ooze. She tried OTC honey lotion today, has not helped much.   Will send in prescription for silver sulfadiazine cream.  Advised patient

## 2020-07-22 ENCOUNTER — PATIENT MESSAGE (OUTPATIENT)
Dept: SURGERY | Facility: CLINIC | Age: 50
End: 2020-07-22

## 2020-08-06 ENCOUNTER — TELEPHONE (OUTPATIENT)
Dept: SURGERY | Facility: CLINIC | Age: 50
End: 2020-08-06

## 2020-08-10 ENCOUNTER — LAB ENCOUNTER (OUTPATIENT)
Dept: LAB | Age: 50
End: 2020-08-10
Attending: FAMILY MEDICINE
Payer: COMMERCIAL

## 2020-08-10 ENCOUNTER — OFFICE VISIT (OUTPATIENT)
Dept: INTERNAL MEDICINE CLINIC | Facility: CLINIC | Age: 50
End: 2020-08-10
Payer: COMMERCIAL

## 2020-08-10 VITALS
WEIGHT: 144 LBS | HEIGHT: 68 IN | DIASTOLIC BLOOD PRESSURE: 62 MMHG | BODY MASS INDEX: 21.82 KG/M2 | SYSTOLIC BLOOD PRESSURE: 104 MMHG | OXYGEN SATURATION: 98 % | HEART RATE: 66 BPM | RESPIRATION RATE: 16 BRPM | TEMPERATURE: 98 F

## 2020-08-10 DIAGNOSIS — Z00.00 ROUTINE GENERAL MEDICAL EXAMINATION AT A HEALTH CARE FACILITY: Primary | ICD-10-CM

## 2020-08-10 DIAGNOSIS — E78.2 MIXED HYPERLIPIDEMIA: ICD-10-CM

## 2020-08-10 DIAGNOSIS — Z00.00 ROUTINE GENERAL MEDICAL EXAMINATION AT A HEALTH CARE FACILITY: ICD-10-CM

## 2020-08-10 DIAGNOSIS — F31.62 BIPOLAR DISORDER, CURRENT EPISODE MIXED, MODERATE (HCC): ICD-10-CM

## 2020-08-10 DIAGNOSIS — F41.1 GENERALIZED ANXIETY DISORDER: ICD-10-CM

## 2020-08-10 DIAGNOSIS — Z90.13 ACQUIRED ABSENCE OF BILATERAL BREASTS AND NIPPLES: ICD-10-CM

## 2020-08-10 LAB
ALBUMIN SERPL-MCNC: 4 G/DL (ref 3.4–5)
ALBUMIN/GLOB SERPL: 1.2 {RATIO} (ref 1–2)
ALP LIVER SERPL-CCNC: 29 U/L (ref 39–100)
ALT SERPL-CCNC: 21 U/L (ref 13–56)
ANION GAP SERPL CALC-SCNC: 4 MMOL/L (ref 0–18)
AST SERPL-CCNC: 15 U/L (ref 15–37)
BASOPHILS # BLD AUTO: 0.03 X10(3) UL (ref 0–0.2)
BASOPHILS NFR BLD AUTO: 0.7 %
BILIRUB SERPL-MCNC: 1 MG/DL (ref 0.1–2)
BUN BLD-MCNC: 9 MG/DL (ref 7–18)
BUN/CREAT SERPL: 12.5 (ref 10–20)
CALCIUM BLD-MCNC: 9 MG/DL (ref 8.5–10.1)
CHLORIDE SERPL-SCNC: 107 MMOL/L (ref 98–112)
CHOLEST SMN-MCNC: 221 MG/DL (ref ?–200)
CO2 SERPL-SCNC: 27 MMOL/L (ref 21–32)
CREAT BLD-MCNC: 0.72 MG/DL (ref 0.55–1.02)
DEPRECATED RDW RBC AUTO: 48.8 FL (ref 35.1–46.3)
EOSINOPHIL # BLD AUTO: 0.07 X10(3) UL (ref 0–0.7)
EOSINOPHIL NFR BLD AUTO: 1.7 %
ERYTHROCYTE [DISTWIDTH] IN BLOOD BY AUTOMATED COUNT: 13.3 % (ref 11–15)
GLOBULIN PLAS-MCNC: 3.3 G/DL (ref 2.8–4.4)
GLUCOSE BLD-MCNC: 94 MG/DL (ref 70–99)
HCT VFR BLD AUTO: 39.1 % (ref 35–48)
HDLC SERPL-MCNC: 71 MG/DL (ref 40–59)
HGB BLD-MCNC: 12.7 G/DL (ref 12–16)
IMM GRANULOCYTES # BLD AUTO: 0.01 X10(3) UL (ref 0–1)
IMM GRANULOCYTES NFR BLD: 0.2 %
LDLC SERPL CALC-MCNC: 137 MG/DL (ref ?–100)
LYMPHOCYTES # BLD AUTO: 1.55 X10(3) UL (ref 1–4)
LYMPHOCYTES NFR BLD AUTO: 36.6 %
M PROTEIN MFR SERPL ELPH: 7.3 G/DL (ref 6.4–8.2)
MCH RBC QN AUTO: 32.1 PG (ref 26–34)
MCHC RBC AUTO-ENTMCNC: 32.5 G/DL (ref 31–37)
MCV RBC AUTO: 98.7 FL (ref 80–100)
MONOCYTES # BLD AUTO: 0.34 X10(3) UL (ref 0.1–1)
MONOCYTES NFR BLD AUTO: 8 %
NEUTROPHILS # BLD AUTO: 2.23 X10 (3) UL (ref 1.5–7.7)
NEUTROPHILS # BLD AUTO: 2.23 X10(3) UL (ref 1.5–7.7)
NEUTROPHILS NFR BLD AUTO: 52.8 %
NONHDLC SERPL-MCNC: 150 MG/DL (ref ?–130)
OSMOLALITY SERPL CALC.SUM OF ELEC: 284 MOSM/KG (ref 275–295)
PATIENT FASTING Y/N/NP: YES
PATIENT FASTING Y/N/NP: YES
PLATELET # BLD AUTO: 218 10(3)UL (ref 150–450)
POTASSIUM SERPL-SCNC: 4 MMOL/L (ref 3.5–5.1)
RBC # BLD AUTO: 3.96 X10(6)UL (ref 3.8–5.3)
SODIUM SERPL-SCNC: 138 MMOL/L (ref 136–145)
TRIGL SERPL-MCNC: 67 MG/DL (ref 30–149)
VLDLC SERPL CALC-MCNC: 13 MG/DL (ref 0–30)
WBC # BLD AUTO: 4.2 X10(3) UL (ref 4–11)

## 2020-08-10 PROCEDURE — 85025 COMPLETE CBC W/AUTO DIFF WBC: CPT | Performed by: INTERNAL MEDICINE

## 2020-08-10 PROCEDURE — 80053 COMPREHEN METABOLIC PANEL: CPT | Performed by: INTERNAL MEDICINE

## 2020-08-10 PROCEDURE — 99214 OFFICE O/P EST MOD 30 MIN: CPT | Performed by: INTERNAL MEDICINE

## 2020-08-10 PROCEDURE — 3008F BODY MASS INDEX DOCD: CPT | Performed by: INTERNAL MEDICINE

## 2020-08-10 PROCEDURE — 3078F DIAST BP <80 MM HG: CPT | Performed by: INTERNAL MEDICINE

## 2020-08-10 PROCEDURE — 36415 COLL VENOUS BLD VENIPUNCTURE: CPT | Performed by: INTERNAL MEDICINE

## 2020-08-10 PROCEDURE — 80061 LIPID PANEL: CPT | Performed by: INTERNAL MEDICINE

## 2020-08-10 PROCEDURE — 93000 ELECTROCARDIOGRAM COMPLETE: CPT | Performed by: INTERNAL MEDICINE

## 2020-08-10 PROCEDURE — 99396 PREV VISIT EST AGE 40-64: CPT | Performed by: INTERNAL MEDICINE

## 2020-08-10 PROCEDURE — 3074F SYST BP LT 130 MM HG: CPT | Performed by: INTERNAL MEDICINE

## 2020-08-10 RX ORDER — AMOXICILLIN 250 MG
CAPSULE ORAL
COMMUNITY
End: 2021-12-21 | Stop reason: ALTCHOICE

## 2020-08-10 NOTE — PATIENT INSTRUCTIONS
Please call central scheduling at [phone] to schedule your labs. [address] [facility] [place] Specialist is Shaji Hogan. Phone: [phone]. Please reach out to her if you do not hear from her in the next 3 days.      You need 3 eight ounce servi

## 2020-08-10 NOTE — PROGRESS NOTES
Tone Pearson is a 53year old female. HPI:   Patient presents for CPX and a pre-op exam requested by dr. Mel Chávez. Understands and accepts she will be billed for 2 visits.    Surgeon:  Dr. Kristen Stock  Procedure: Bilateral implant exchange, capsulotomy, c : 144 lb (65.3 kg)  02/14/20 : 156 lb (70.8 kg)  02/12/20 : 154 lb (69.9 kg)  10/22/19 : 153 lb (69.4 kg)  10/01/19 : 150 lb (68 kg)  08/16/19 : 148 lb 8 oz (67.4 kg)      No Known Allergies    Family History   Problem Relation Age of Onset   • Lipids [facility] tobacco: Never Used      Tobacco comment: social smoker in college for a few years    Alcohol use: Yes      Frequency: 2-3 times a week      Drinks per session: 1 or 2      Binge frequency: Never      Comment: 5 drinks per week    Drug use: Yes      L[employer] cessation. using it for her anxiety. We discussed using hydroxyzine instead and she is very interested in mindfulness/meditation. # Invasive DCIS of Right Breast : s/p bilateral mastectomy in 2016. Did not tolerate tamoxifen. Follows with oncology.    # H

## 2020-08-11 ENCOUNTER — TELEPHONE (OUTPATIENT)
Dept: INTERNAL MEDICINE CLINIC | Facility: CLINIC | Age: 50
End: 2020-08-11

## 2020-08-15 ENCOUNTER — LAB ENCOUNTER (OUTPATIENT)
Dept: LAB | Facility: HOSPITAL | Age: 50
End: 2020-08-15
Attending: SURGERY
Payer: COMMERCIAL

## 2020-08-15 DIAGNOSIS — Z01.812 PRE-PROCEDURE LAB EXAM: Primary | ICD-10-CM

## 2020-08-16 ENCOUNTER — ANESTHESIA EVENT (OUTPATIENT)
Dept: SURGERY | Facility: HOSPITAL | Age: 50
End: 2020-08-16
Payer: COMMERCIAL

## 2020-08-16 LAB — SARS-COV-2 RNA RESP QL NAA+PROBE: NOT DETECTED

## 2020-08-16 NOTE — ANESTHESIA PREPROCEDURE EVALUATION
PRE-OP EVALUATION    Patient Name: Destinee Curtis    Pre-op Diagnosis: Status post bilateral mastectomy [Z90.13]    Procedure(s):  Bilateral implant exchange, capsulotomy, capsulorrhaphy, excision of excess skin bilateral inframammary folds, bilateral pect Bilateral 9/30/2016    Performed by Saurav Reich MD at [address] [place] [address]   • BREAST RECONSTRUCTION/ IMMEDIATE/EXPANDER Bilateral 6/29/2016    Performed by Saurav Reich MD at [facility] MAIN OR   • Mary [address] Right 6/29/2 FB  TM distance: > 6 cm  Neck ROM: full Cardiovascular    Cardiovascular exam normal.  Rhythm: regular  Rate: normal     Dental    No notable dental history. Pulmonary    Pulmonary exam normal.  Breath sounds clear to auscultation bilaterally.

## 2020-08-17 ENCOUNTER — ANESTHESIA (OUTPATIENT)
Dept: SURGERY | Facility: HOSPITAL | Age: 50
End: 2020-08-17
Payer: COMMERCIAL

## 2020-08-17 ENCOUNTER — HOSPITAL ENCOUNTER (OUTPATIENT)
Facility: HOSPITAL | Age: 50
Setting detail: HOSPITAL OUTPATIENT SURGERY
Discharge: HOME | End: 2020-08-17
Attending: SURGERY | Admitting: SURGERY
Payer: COMMERCIAL

## 2020-08-17 VITALS
RESPIRATION RATE: 15 BRPM | OXYGEN SATURATION: 100 % | SYSTOLIC BLOOD PRESSURE: 115 MMHG | BODY MASS INDEX: 23.72 KG/M2 | HEIGHT: 68 IN | TEMPERATURE: 99 F | WEIGHT: 156.5 LBS | DIASTOLIC BLOOD PRESSURE: 68 MMHG | HEART RATE: 69 BPM

## 2020-08-17 DIAGNOSIS — Z90.13 ACQUIRED ABSENCE OF BILATERAL BREASTS AND NIPPLES: Primary | ICD-10-CM

## 2020-08-17 DIAGNOSIS — Z90.13 STATUS POST BILATERAL MASTECTOMY: ICD-10-CM

## 2020-08-17 LAB
POCT LOT NUMBER: NORMAL
POCT URINE PREGNANCY: NEGATIVE

## 2020-08-17 PROCEDURE — 88342 IMHCHEM/IMCYTCHM 1ST ANTB: CPT | Performed by: SURGERY

## 2020-08-17 PROCEDURE — 0HRV37Z REPLACEMENT OF BILATERAL BREAST WITH AUTOLOGOUS TISSUE SUBSTITUTE, PERCUTANEOUS APPROACH: ICD-10-PCS | Performed by: SURGERY

## 2020-08-17 PROCEDURE — 0JD83ZZ EXTRACTION OF ABDOMEN SUBCUTANEOUS TISSUE AND FASCIA, PERCUTANEOUS APPROACH: ICD-10-PCS | Performed by: SURGERY

## 2020-08-17 PROCEDURE — 88185 FLOWCYTOMETRY/TC ADD-ON: CPT | Performed by: SURGERY

## 2020-08-17 PROCEDURE — 0HPT0JZ REMOVAL OF SYNTHETIC SUBSTITUTE FROM RIGHT BREAST, OPEN APPROACH: ICD-10-PCS | Performed by: SURGERY

## 2020-08-17 PROCEDURE — 88108 CYTOPATH CONCENTRATE TECH: CPT | Performed by: SURGERY

## 2020-08-17 PROCEDURE — 88184 FLOWCYTOMETRY/ TC 1 MARKER: CPT | Performed by: SURGERY

## 2020-08-17 PROCEDURE — 0JDM3ZZ EXTRACTION OF LEFT UPPER LEG SUBCUTANEOUS TISSUE AND FASCIA, PERCUTANEOUS APPROACH: ICD-10-PCS | Performed by: SURGERY

## 2020-08-17 PROCEDURE — 88300 SURGICAL PATH GROSS: CPT | Performed by: SURGERY

## 2020-08-17 PROCEDURE — 81025 URINE PREGNANCY TEST: CPT | Performed by: SURGERY

## 2020-08-17 PROCEDURE — 88341 IMHCHEM/IMCYTCHM EA ADD ANTB: CPT | Performed by: SURGERY

## 2020-08-17 PROCEDURE — 88305 TISSUE EXAM BY PATHOLOGIST: CPT | Performed by: SURGERY

## 2020-08-17 PROCEDURE — 0HRV0JZ REPLACEMENT OF BILATERAL BREAST WITH SYNTHETIC SUBSTITUTE, OPEN APPROACH: ICD-10-PCS | Performed by: SURGERY

## 2020-08-17 PROCEDURE — 0JDL3ZZ EXTRACTION OF RIGHT UPPER LEG SUBCUTANEOUS TISSUE AND FASCIA, PERCUTANEOUS APPROACH: ICD-10-PCS | Performed by: SURGERY

## 2020-08-17 PROCEDURE — 0WUF0JZ SUPPLEMENT ABDOMINAL WALL WITH SYNTHETIC SUBSTITUTE, OPEN APPROACH: ICD-10-PCS | Performed by: SURGERY

## 2020-08-17 PROCEDURE — 0HPU0JZ REMOVAL OF SYNTHETIC SUBSTITUTE FROM LEFT BREAST, OPEN APPROACH: ICD-10-PCS | Performed by: SURGERY

## 2020-08-17 DEVICE — IMPLANTABLE DEVICE: Type: IMPLANTABLE DEVICE | Site: BREAST | Status: FUNCTIONAL

## 2020-08-17 DEVICE — VENTRALEX ST HERNIA PATCH
Type: IMPLANTABLE DEVICE | Site: ABDOMEN | Status: FUNCTIONAL
Brand: VENTRALEX ST HERNIA PATCH

## 2020-08-17 RX ORDER — HEPARIN SODIUM 5000 [USP'U]/ML
5000 INJECTION, SOLUTION INTRAVENOUS; SUBCUTANEOUS ONCE
Status: COMPLETED | OUTPATIENT
Start: 2020-08-17 | End: 2020-08-17

## 2020-08-17 RX ORDER — LIDOCAINE HYDROCHLORIDE AND EPINEPHRINE 10; 10 MG/ML; UG/ML
INJECTION, SOLUTION INFILTRATION; PERINEURAL AS NEEDED
Status: DISCONTINUED | OUTPATIENT
Start: 2020-08-17 | End: 2020-08-17 | Stop reason: HOSPADM

## 2020-08-17 RX ORDER — NALOXONE HYDROCHLORIDE 0.4 MG/ML
80 INJECTION, SOLUTION INTRAMUSCULAR; INTRAVENOUS; SUBCUTANEOUS AS NEEDED
Status: DISCONTINUED | OUTPATIENT
Start: 2020-08-17 | End: 2020-08-17

## 2020-08-17 RX ORDER — NEOSTIGMINE METHYLSULFATE 1 MG/ML
INJECTION INTRAVENOUS AS NEEDED
Status: DISCONTINUED | OUTPATIENT
Start: 2020-08-17 | End: 2020-08-17 | Stop reason: SURG

## 2020-08-17 RX ORDER — HYDROCODONE BITARTRATE AND ACETAMINOPHEN 5; 325 MG/1; MG/1
1 TABLET ORAL AS NEEDED
Status: COMPLETED | OUTPATIENT
Start: 2020-08-17 | End: 2020-08-17

## 2020-08-17 RX ORDER — PHENYLEPHRINE HCL 10 MG/ML
VIAL (ML) INJECTION AS NEEDED
Status: DISCONTINUED | OUTPATIENT
Start: 2020-08-17 | End: 2020-08-17 | Stop reason: SURG

## 2020-08-17 RX ORDER — SODIUM CHLORIDE, SODIUM LACTATE, POTASSIUM CHLORIDE, CALCIUM CHLORIDE 600; 310; 30; 20 MG/100ML; MG/100ML; MG/100ML; MG/100ML
INJECTION, SOLUTION INTRAVENOUS CONTINUOUS
Status: DISCONTINUED | OUTPATIENT
Start: 2020-08-17 | End: 2020-08-17

## 2020-08-17 RX ORDER — CEPHALEXIN 500 MG/1
500 CAPSULE ORAL 4 TIMES DAILY
Qty: 28 CAPSULE | Refills: 0 | Status: SHIPPED | OUTPATIENT
Start: 2020-08-17 | End: 2020-08-28 | Stop reason: ALTCHOICE

## 2020-08-17 RX ORDER — BACITRACIN 50000 [USP'U]/1
INJECTION, POWDER, LYOPHILIZED, FOR SOLUTION INTRAMUSCULAR AS NEEDED
Status: DISCONTINUED | OUTPATIENT
Start: 2020-08-17 | End: 2020-08-17 | Stop reason: HOSPADM

## 2020-08-17 RX ORDER — ACETAMINOPHEN 500 MG
1000 TABLET ORAL ONCE AS NEEDED
Status: DISCONTINUED | OUTPATIENT
Start: 2020-08-17 | End: 2020-08-17

## 2020-08-17 RX ORDER — ACETAMINOPHEN 500 MG
1000 TABLET ORAL EVERY 6 HOURS PRN
COMMUNITY
End: 2020-08-28 | Stop reason: ALTCHOICE

## 2020-08-17 RX ORDER — GLYCOPYRROLATE 0.2 MG/ML
INJECTION, SOLUTION INTRAMUSCULAR; INTRAVENOUS AS NEEDED
Status: DISCONTINUED | OUTPATIENT
Start: 2020-08-17 | End: 2020-08-17 | Stop reason: SURG

## 2020-08-17 RX ORDER — HYDROMORPHONE HYDROCHLORIDE 1 MG/ML
0.4 INJECTION, SOLUTION INTRAMUSCULAR; INTRAVENOUS; SUBCUTANEOUS EVERY 5 MIN PRN
Status: DISCONTINUED | OUTPATIENT
Start: 2020-08-17 | End: 2020-08-17

## 2020-08-17 RX ORDER — ONDANSETRON 2 MG/ML
INJECTION INTRAMUSCULAR; INTRAVENOUS AS NEEDED
Status: DISCONTINUED | OUTPATIENT
Start: 2020-08-17 | End: 2020-08-17 | Stop reason: SURG

## 2020-08-17 RX ORDER — ROCURONIUM BROMIDE 10 MG/ML
INJECTION, SOLUTION INTRAVENOUS AS NEEDED
Status: DISCONTINUED | OUTPATIENT
Start: 2020-08-17 | End: 2020-08-17 | Stop reason: SURG

## 2020-08-17 RX ORDER — DOCUSATE SODIUM 100 MG/1
100 CAPSULE, LIQUID FILLED ORAL 2 TIMES DAILY PRN
Qty: 20 CAPSULE | Refills: 1 | Status: SHIPPED | OUTPATIENT
Start: 2020-08-17 | End: 2020-08-28 | Stop reason: ALTCHOICE

## 2020-08-17 RX ORDER — ONDANSETRON 4 MG/1
4 TABLET, FILM COATED ORAL EVERY 8 HOURS PRN
Qty: 20 TABLET | Refills: 1 | Status: SHIPPED | OUTPATIENT
Start: 2020-08-17 | End: 2020-08-28 | Stop reason: ALTCHOICE

## 2020-08-17 RX ORDER — DEXAMETHASONE SODIUM PHOSPHATE 4 MG/ML
VIAL (ML) INJECTION AS NEEDED
Status: DISCONTINUED | OUTPATIENT
Start: 2020-08-17 | End: 2020-08-17 | Stop reason: SURG

## 2020-08-17 RX ORDER — BUPIVACAINE HYDROCHLORIDE 5 MG/ML
INJECTION, SOLUTION EPIDURAL; INTRACAUDAL AS NEEDED
Status: DISCONTINUED | OUTPATIENT
Start: 2020-08-17 | End: 2020-08-17 | Stop reason: HOSPADM

## 2020-08-17 RX ORDER — LIDOCAINE HYDROCHLORIDE 10 MG/ML
INJECTION, SOLUTION EPIDURAL; INFILTRATION; INTRACAUDAL; PERINEURAL AS NEEDED
Status: DISCONTINUED | OUTPATIENT
Start: 2020-08-17 | End: 2020-08-17 | Stop reason: SURG

## 2020-08-17 RX ORDER — ONDANSETRON 2 MG/ML
INJECTION INTRAMUSCULAR; INTRAVENOUS
Status: COMPLETED
Start: 2020-08-17 | End: 2020-08-17

## 2020-08-17 RX ORDER — HYDROMORPHONE HYDROCHLORIDE 1 MG/ML
INJECTION, SOLUTION INTRAMUSCULAR; INTRAVENOUS; SUBCUTANEOUS
Status: COMPLETED
Start: 2020-08-17 | End: 2020-08-17

## 2020-08-17 RX ORDER — ONDANSETRON 2 MG/ML
4 INJECTION INTRAMUSCULAR; INTRAVENOUS ONCE
Status: COMPLETED | OUTPATIENT
Start: 2020-08-17 | End: 2020-08-17

## 2020-08-17 RX ORDER — MIDAZOLAM HYDROCHLORIDE 1 MG/ML
INJECTION INTRAMUSCULAR; INTRAVENOUS AS NEEDED
Status: DISCONTINUED | OUTPATIENT
Start: 2020-08-17 | End: 2020-08-17 | Stop reason: SURG

## 2020-08-17 RX ORDER — HYDROCODONE BITARTRATE AND ACETAMINOPHEN 5; 325 MG/1; MG/1
2 TABLET ORAL AS NEEDED
Status: COMPLETED | OUTPATIENT
Start: 2020-08-17 | End: 2020-08-17

## 2020-08-17 RX ORDER — CEFAZOLIN SODIUM/WATER 2 G/20 ML
2 SYRINGE (ML) INTRAVENOUS ONCE
Status: COMPLETED | OUTPATIENT
Start: 2020-08-17 | End: 2020-08-17

## 2020-08-17 RX ORDER — KETAMINE HYDROCHLORIDE 50 MG/ML
INJECTION, SOLUTION, CONCENTRATE INTRAMUSCULAR; INTRAVENOUS AS NEEDED
Status: DISCONTINUED | OUTPATIENT
Start: 2020-08-17 | End: 2020-08-17 | Stop reason: SURG

## 2020-08-17 RX ORDER — METOCLOPRAMIDE HYDROCHLORIDE 5 MG/ML
10 INJECTION INTRAMUSCULAR; INTRAVENOUS ONCE
Status: COMPLETED | OUTPATIENT
Start: 2020-08-17 | End: 2020-08-17

## 2020-08-17 RX ORDER — SODIUM CHLORIDE 9 MG/ML
INJECTION, SOLUTION INTRAVENOUS CONTINUOUS
Status: DISCONTINUED | OUTPATIENT
Start: 2020-08-17 | End: 2020-08-17

## 2020-08-17 RX ORDER — MIDAZOLAM HYDROCHLORIDE 1 MG/ML
1 INJECTION INTRAMUSCULAR; INTRAVENOUS EVERY 5 MIN PRN
Status: DISCONTINUED | OUTPATIENT
Start: 2020-08-17 | End: 2020-08-17

## 2020-08-17 RX ORDER — METOCLOPRAMIDE HYDROCHLORIDE 5 MG/ML
INJECTION INTRAMUSCULAR; INTRAVENOUS
Status: COMPLETED
Start: 2020-08-17 | End: 2020-08-17

## 2020-08-17 RX ORDER — HYDROCODONE BITARTRATE AND ACETAMINOPHEN 5; 325 MG/1; MG/1
TABLET ORAL
Qty: 40 TABLET | Refills: 0 | Status: SHIPPED | OUTPATIENT
Start: 2020-08-17 | End: 2020-08-28 | Stop reason: ALTCHOICE

## 2020-08-17 RX ORDER — ACETAMINOPHEN 500 MG
1000 TABLET ORAL ONCE
Status: DISCONTINUED | OUTPATIENT
Start: 2020-08-17 | End: 2020-08-17 | Stop reason: HOSPADM

## 2020-08-17 RX ADMIN — SODIUM CHLORIDE, SODIUM LACTATE, POTASSIUM CHLORIDE, CALCIUM CHLORIDE: 600; 310; 30; 20 INJECTION, SOLUTION INTRAVENOUS at 16:21:00

## 2020-08-17 RX ADMIN — ROCURONIUM BROMIDE 80 MG: 10 INJECTION, SOLUTION INTRAVENOUS at 12:08:00

## 2020-08-17 RX ADMIN — MIDAZOLAM HYDROCHLORIDE 1 MG: 1 INJECTION INTRAMUSCULAR; INTRAVENOUS at 12:03:00

## 2020-08-17 RX ADMIN — PHENYLEPHRINE HCL 25 MCG: 10 MG/ML VIAL (ML) INJECTION at 16:10:00

## 2020-08-17 RX ADMIN — NEOSTIGMINE METHYLSULFATE 2 MG: 1 INJECTION INTRAVENOUS at 16:53:00

## 2020-08-17 RX ADMIN — MIDAZOLAM HYDROCHLORIDE 1 MG: 1 INJECTION INTRAMUSCULAR; INTRAVENOUS at 12:07:00

## 2020-08-17 RX ADMIN — PHENYLEPHRINE HCL 25 MCG: 10 MG/ML VIAL (ML) INJECTION at 14:30:00

## 2020-08-17 RX ADMIN — KETAMINE HYDROCHLORIDE 30 MG: 50 INJECTION, SOLUTION, CONCENTRATE INTRAMUSCULAR; INTRAVENOUS at 12:07:00

## 2020-08-17 RX ADMIN — DEXAMETHASONE SODIUM PHOSPHATE 4 MG: 4 MG/ML VIAL (ML) INJECTION at 12:19:00

## 2020-08-17 RX ADMIN — LIDOCAINE HYDROCHLORIDE 50 MG: 10 INJECTION, SOLUTION EPIDURAL; INFILTRATION; INTRACAUDAL; PERINEURAL at 12:07:00

## 2020-08-17 RX ADMIN — ONDANSETRON 4 MG: 2 INJECTION INTRAMUSCULAR; INTRAVENOUS at 16:33:00

## 2020-08-17 RX ADMIN — GLYCOPYRROLATE 0.4 MG: 0.2 INJECTION, SOLUTION INTRAMUSCULAR; INTRAVENOUS at 16:53:00

## 2020-08-17 RX ADMIN — SODIUM CHLORIDE, SODIUM LACTATE, POTASSIUM CHLORIDE, CALCIUM CHLORIDE: 600; 310; 30; 20 INJECTION, SOLUTION INTRAVENOUS at 17:46:00

## 2020-08-17 RX ADMIN — CEFAZOLIN SODIUM/WATER 2 G: 2 G/20 ML SYRINGE (ML) INTRAVENOUS at 12:21:00

## 2020-08-17 RX ADMIN — CEFAZOLIN SODIUM/WATER 2 G: 2 G/20 ML SYRINGE (ML) INTRAVENOUS at 16:11:00

## 2020-08-17 NOTE — H&P
History & Physical Examination    Patient Name: Delio Pierre  MRN: [id number]  CSN: [id number]  YOB: 1970    Diagnosis: recurrent ventral hernia    Present Illness: 59-year-old female was seen in the office for evaluation of a recurrent  saul ringers infusion, , Intravenous, Continuous  acetaminophen (TYLENOL EXTRA STRENGTH) tab 1,000 mg, 1,000 mg, Oral, Once  ceFAZolin sodium (ANCEF/KEFZOL) 2 GM/20ML premix IV syringe 2 g, 2 g, Intravenous, Once  bacitracin (BACIIM) 50,000 Units, Gentamicin Solomon Prostate, agent orange   • Anxiety Maternal Uncle      Social History    Tobacco Use      Smoking status: Former Smoker        Types: Cigarettes        Quit date: 1995        Years since quittin.9      Smokeless tobacco: Never Used      Tobacco c

## 2020-08-17 NOTE — ANESTHESIA POSTPROCEDURE EVALUATION
[facility] Patient Status:  Hospital Outpatient Surgery   Age/Gender 53year old female MRN [id number]   [facility] SURGERY Attending Genesis Buckner, [address] [facility] [address] Day # 0 PCP Kenroy Mario MD       Anesthesia Post-op Note

## 2020-08-17 NOTE — BRIEF OP NOTE
Pre-Operative Diagnosis: Status post bilateral mastectomy [Z90.13]     Post-Operative Diagnosis: Status post bilateral mastectomy [Z90.13]      Procedure Performed:   Procedure(s):  Bilateral implant exchange, capsulotomy, capsulorrhaphy, excision of exces

## 2020-08-17 NOTE — OPERATIVE REPORT
[facility] PSYCHIATRIC  Operative Note    Kvng Lima Location: OR   CSN [id number] MRN [id number]   Admission Date 8/17/2020 Operation Date 8/17/2020   Attending Physician Annie Gamble MD Operating Physician Juan Taveras MD     Date of procedure: bilateral breast implant exchange.   This portion of the procedure will be dictated by Dr. Kirsten Trotter      Discussed with patient:  The potential risks and benefits of the procedure were discussed in detail with the patient including but not limited to bleeding, electrocautery was used for hemostasis. The umbilicus was tacked down to the anterior abdominal fascia using 2-0 Vicryl in a figure-of-eight stitch.   The wound was then reapproximated in a layered fashion using 2-0, 3-0, and  4-0 Vicryl in a subcuticular

## 2020-08-17 NOTE — ANESTHESIA PROCEDURE NOTES
Airway  Urgency: elective    Airway not difficult    General Information and Staff    Patient location during procedure: OR  Anesthesiologist: Ken Chavez MD  Performed: anesthesiologist     Indications and Patient Condition  Indications for airwa

## 2020-08-18 ENCOUNTER — TELEPHONE (OUTPATIENT)
Dept: SURGERY | Facility: CLINIC | Age: 50
End: 2020-08-18

## 2020-08-18 NOTE — TELEPHONE ENCOUNTER
Patients  called with clarifying questions about the dressing around the belly button. I informed him that per the d/c instructions, bacitracin should be applied and covered with gauze and changed daily.    He verbalized understanding and was appreci

## 2020-08-18 NOTE — OPERATIVE REPORT
[facility]    PATIENT'S NAME: Elsi MORGANas   ATTENDING PHYSICIAN: Laura Hua M.D. OPERATING PHYSICIAN: Amanda Mike MD   PATIENT ACCOUNT#:   [id number]    LOCATION:  [address] [place] [address] 10  MEDICAL RECORD #:   [id number]       DATE contracture, persistent pectoralis muscle animations, deformity, irregularity, fat necrosis, ESTEPHANIE-ALCL, need for further surgery. Patient understands and wishes to proceed. Questions were answered.       OPERATIVE TECHNIQUE:  Patient was identified in the evaluation. Then, the capsule was inspected. On both sides, then a capsulotomy was performed. This was done primarily medially and superiorly, and also pectoralis muscle animation was relieved.   This was done in the spots that were preoperatively ma were applied on the breast incisions and Steri-Strips were applied on the stab incisions. TopiFoam and Ace bandages were used for the leg dressing. Patient tolerated the procedure well and awoke from anesthesia without difficulty.   All sponge, instrument

## 2020-08-18 NOTE — TELEPHONE ENCOUNTER
Pts spouse called office with wound care questions. Followed up with PA. Provided instructions were from plastics. They are to follow up with plastics for specific wound care instructions on discharge instructions. Spouse and patient notified. they v/u.

## 2020-08-20 ENCOUNTER — MED REC SCAN ONLY (OUTPATIENT)
Dept: SURGERY | Facility: CLINIC | Age: 50
End: 2020-08-20

## 2020-08-21 ENCOUNTER — APPOINTMENT (OUTPATIENT)
Dept: HEMATOLOGY/ONCOLOGY | Facility: HOSPITAL | Age: 50
End: 2020-08-21
Attending: SPECIALIST
Payer: COMMERCIAL

## 2020-08-25 LAB
CD10 CELLS NFR SPEC: 2 %
CD19 CELLS NFR SPEC: 7 %
CD19/CD10 CELLS: <1 %
CD20 CELLS NFR SPEC: 2 %
CD3 CELLS NFR SPEC: 86 %
CD3+CD4+ CELLS NFR SPEC: 55 %
CD3+CD4+ CELLS/CD3+CD8+ CLL SPEC: 2.4
CD3+CD8+ CELLS NFR SPEC: 23 %
CD45 CELLS NFR SPEC: 100 %
CD5 CELLS NFR SPEC: 77 %
CD5/CD19 CELLS: 1 %
CD56 CELLS NFR SPEC: 2 %
CD7 CELLS NFR SPEC: 74 %
CELL SURF KAPPA/LAMBDA RATIO: 1.5
CELL SURF LAMBDA LIGHT CHAIN: 2 %
CELL SURFACE KAPPA LIGHT CHAIN: 3 %

## 2020-08-26 LAB — NON GYNE INTERPRETATION: NEGATIVE

## 2020-08-28 ENCOUNTER — OFFICE VISIT (OUTPATIENT)
Dept: HEMATOLOGY/ONCOLOGY | Facility: HOSPITAL | Age: 50
End: 2020-08-28
Attending: SPECIALIST
Payer: COMMERCIAL

## 2020-08-28 VITALS
HEIGHT: 67.76 IN | OXYGEN SATURATION: 100 % | TEMPERATURE: 99 F | WEIGHT: 152.19 LBS | HEART RATE: 67 BPM | DIASTOLIC BLOOD PRESSURE: 67 MMHG | RESPIRATION RATE: 16 BRPM | SYSTOLIC BLOOD PRESSURE: 118 MMHG | BODY MASS INDEX: 23.33 KG/M2

## 2020-08-28 DIAGNOSIS — C50.811 MALIGNANT NEOPLASM OF OVERLAPPING SITES OF RIGHT BREAST IN FEMALE, ESTROGEN RECEPTOR POSITIVE (HCC): Primary | ICD-10-CM

## 2020-08-28 DIAGNOSIS — Z17.0 MALIGNANT NEOPLASM OF OVERLAPPING SITES OF RIGHT BREAST IN FEMALE, ESTROGEN RECEPTOR POSITIVE (HCC): Primary | ICD-10-CM

## 2020-08-28 PROCEDURE — 99215 OFFICE O/P EST HI 40 MIN: CPT | Performed by: SPECIALIST

## 2020-08-28 RX ORDER — IBUPROFEN 200 MG
400 TABLET ORAL EVERY 6 HOURS PRN
COMMUNITY
End: 2021-09-13

## 2020-08-28 NOTE — PROGRESS NOTES
Patient is here today for follow up with Ynes Prieto for Breast Cancer. Patient denies pain. Medication list and medical history were reviewed and updated.      Education Record    Learner:  Patient    Disease / Diagnosis: breast cancer    Barriers / Hedda [place]

## 2020-09-02 ENCOUNTER — OFFICE VISIT (OUTPATIENT)
Dept: SURGERY | Facility: CLINIC | Age: 50
End: 2020-09-02
Payer: COMMERCIAL

## 2020-09-02 DIAGNOSIS — Z90.13 ACQUIRED ABSENCE OF BILATERAL BREASTS AND NIPPLES: Primary | ICD-10-CM

## 2020-09-02 PROCEDURE — 99024 POSTOP FOLLOW-UP VISIT: CPT | Performed by: SURGERY

## 2020-09-02 NOTE — PROGRESS NOTES
Taj Marcos is a 53year old female who presents today for a follow-up after removal of bilateral intact recalled anatomic shape implants, capsulorrhaphy bilaterally and capsulotomy, placement of bilateral permanent implants ([facility] soft touch

## 2020-09-06 NOTE — PROGRESS NOTES
[facility] Progress Note      Patient Name: Balwinder Ying   YOB: 1970  Medical Record Number: [id number]  Consulting Physician: Vilma Sanchez M.D.        Date of Visit: 8/28/2020      Chief Complaint  Invasive ductal 12/2016 due to severe depression that began with the start of tamoxifen. Patient failed to follow up between 07/2017 and 01/2019. History of Present Illness  Patient returns for follow up. She feels well.  She denies any self palpated chest wall mas hemoptysis, pleuritic chest pain. Psychiatric Mood is good.     Vital Signs   /67 (BP Location: Left arm, Patient Position: Sitting, Cuff Size: adult)   Pulse 67   Temp 99.1 °F (37.3 °C) (Temporal)   Resp 16   Ht 1.721 m (5' 7.76\")   Wt 69 kg (152 l 10.0 - 20.0    Calcium, Total 9.0 8.5 - 10.1 mg/dL    Calculated Osmolality 284 275 - 295 mOsm/kg    GFR, Non- 99 >=60    GFR, -American 114 >=60    AST 15 15 - 37 U/L    ALT 21 13 - 56 U/L    Alkaline Phosphatase 29 (L) 39 - 100 U/L breast scar, excision:  -Skin with scar, negative for malignancy.     C. Right breast capsule, excision:  -Fibromembranous tissue with focal chronic inflammation, consistent with breast implant capsule.  -No evidence of malignancy.     D.   Right breast im

## 2020-11-26 ENCOUNTER — PATIENT MESSAGE (OUTPATIENT)
Dept: INTERNAL MEDICINE CLINIC | Facility: CLINIC | Age: 50
End: 2020-11-26

## 2020-11-27 ENCOUNTER — HOSPITAL ENCOUNTER (OUTPATIENT)
Dept: CT IMAGING | Age: 50
Discharge: HOME | End: 2020-11-27
Attending: INTERNAL MEDICINE
Payer: COMMERCIAL

## 2020-11-27 ENCOUNTER — TELEMEDICINE (OUTPATIENT)
Dept: INTERNAL MEDICINE CLINIC | Facility: CLINIC | Age: 50
End: 2020-11-27
Payer: COMMERCIAL

## 2020-11-27 DIAGNOSIS — G44.84 PRIMARY EXERTIONAL HEADACHE: ICD-10-CM

## 2020-11-27 DIAGNOSIS — G44.84 PRIMARY EXERTIONAL HEADACHE: Primary | ICD-10-CM

## 2020-11-27 PROCEDURE — 99214 OFFICE O/P EST MOD 30 MIN: CPT | Performed by: INTERNAL MEDICINE

## 2020-11-27 PROCEDURE — 70450 CT HEAD/BRAIN W/O DYE: CPT | Performed by: INTERNAL MEDICINE

## 2020-11-27 NOTE — PROGRESS NOTES
Virtual Telephone Check-In    This visit is conducted using Telemedicine with live, interactive video and audio.  [place]   Patient understands and accepts financial responsibility for any deductible, co-insurance and/or co-pays associat calling about a headache. It started about 1 week ago and happened when she was having sex. The headache occurred during orgasm and lasted several minutes. She noticed it again only during orgasm.  She has never had headaches before and no history of migrai headache/thunderclap headache\" with or without focal deficits then needs to go the ER immediately. Appointment scheduled.   [facility]

## 2020-11-27 NOTE — TELEPHONE ENCOUNTER
From: Jarod Haque  To: Valorie Coon MD  Sent: 11/26/2020 10:28 AM CST  Subject: Other    Hi, Dr. Gabriela Orozco. I hope you're enjoying your Thanksgiving. I'm messaging because we just found out one of my daughters has Covid.  I'm wondering whether or not I can

## 2020-11-27 NOTE — TELEPHONE ENCOUNTER
[facility]  at 11:00am.

## 2020-11-30 DIAGNOSIS — G44.84 EXERTIONAL HEADACHE: Primary | ICD-10-CM

## 2021-01-04 PROBLEM — F31.81 BIPOLAR II DISORDER (HCC): Status: ACTIVE | Noted: 2021-01-04

## 2021-01-06 ENCOUNTER — OFFICE VISIT (OUTPATIENT)
Dept: NEUROLOGY | Facility: CLINIC | Age: 51
End: 2021-01-06
Payer: COMMERCIAL

## 2021-01-06 VITALS
BODY MASS INDEX: 23 KG/M2 | HEART RATE: 66 BPM | SYSTOLIC BLOOD PRESSURE: 118 MMHG | RESPIRATION RATE: 16 BRPM | WEIGHT: 150 LBS | DIASTOLIC BLOOD PRESSURE: 68 MMHG

## 2021-01-06 DIAGNOSIS — G44.82 HEADACHE ASSOCIATED WITH SEXUAL ACTIVITY: Primary | ICD-10-CM

## 2021-01-06 PROCEDURE — 3078F DIAST BP <80 MM HG: CPT | Performed by: OTHER

## 2021-01-06 PROCEDURE — 99244 OFF/OP CNSLTJ NEW/EST MOD 40: CPT | Performed by: OTHER

## 2021-01-06 PROCEDURE — 3074F SYST BP LT 130 MM HG: CPT | Performed by: OTHER

## 2021-01-06 NOTE — PROGRESS NOTES
HPI:    Patient ID: Tamika Olivas is a 53year old female. PCP: Dr Lupe Gu    Thank you for requesting this consultation to us.  Below is the summary of my evaluation    HPI  Abel Jacob is a 53year old female who presented for evaluation of exertional he Performed by Amparo Freire MD at [address] [place] [address] N/A 8/17/2020    Performed by Amparo Freire MD at [facility] MAIN OR   • IMPLANT LEFT      2016 removal of expanders and implants placed   • IMPLANT RIGHT     • MASTECTOMY LEFT     • MASTE Psychiatric/Behavioral: Negative. All other systems reviewed and are negative. Current Outpatient Medications   Medication Sig Dispense Refill   • lamoTRIgine 25 MG Oral Tab Take 1 tablet (25 mg total) by mouth daily.  Take along with 100mg d touch    Coordination: Finger-to-nose, heel-to-shin intact    Gait: Casual, toe, heel, and tandem gait are normal.               ASSESSMENT/PLAN:   (G44.82) Headache associated with sexual activity  (primary encounter diagnosis)  Plan: CTA BRAIN + CTA EVANS

## 2021-01-14 ENCOUNTER — TELEPHONE (OUTPATIENT)
Dept: NEUROLOGY | Facility: CLINIC | Age: 51
End: 2021-01-14

## 2021-01-14 ENCOUNTER — HOSPITAL ENCOUNTER (OUTPATIENT)
Dept: CT IMAGING | Age: 51
Discharge: HOME | End: 2021-01-14
Attending: Other
Payer: COMMERCIAL

## 2021-01-14 DIAGNOSIS — G44.82 HEADACHE ASSOCIATED WITH SEXUAL ACTIVITY: ICD-10-CM

## 2021-01-14 DIAGNOSIS — I77.71 DISSECTION OF LEFT CAROTID ARTERY (HCC): Primary | ICD-10-CM

## 2021-01-14 LAB — CREAT BLD-MCNC: 0.8 MG/DL (ref 0.55–1.02)

## 2021-01-14 PROCEDURE — 82565 ASSAY OF CREATININE: CPT

## 2021-01-14 PROCEDURE — 70498 CT ANGIOGRAPHY NECK: CPT | Performed by: OTHER

## 2021-01-14 PROCEDURE — 70496 CT ANGIOGRAPHY HEAD: CPT | Performed by: OTHER

## 2021-01-14 NOTE — TELEPHONE ENCOUNTER
Patient was called with CTA head and neck results. Possible dissection left carotid at skull base.  We recommend MRI brain T1 fat suppression for further evaluation    Start Aspirin full dose daily  Avoid neck hypertension/rotation, exertional activities/

## 2021-01-19 ENCOUNTER — HOSPITAL ENCOUNTER (OUTPATIENT)
Dept: MRI IMAGING | Age: 51
Discharge: HOME | End: 2021-01-19
Attending: Other
Payer: COMMERCIAL

## 2021-01-19 DIAGNOSIS — I77.71 DISSECTION OF LEFT CAROTID ARTERY (HCC): ICD-10-CM

## 2021-01-19 PROCEDURE — 70551 MRI BRAIN STEM W/O DYE: CPT | Performed by: OTHER

## 2021-02-17 ENCOUNTER — OFFICE VISIT (OUTPATIENT)
Dept: NEUROLOGY | Facility: CLINIC | Age: 51
End: 2021-02-17
Payer: COMMERCIAL

## 2021-02-17 VITALS
RESPIRATION RATE: 16 BRPM | DIASTOLIC BLOOD PRESSURE: 64 MMHG | HEART RATE: 78 BPM | SYSTOLIC BLOOD PRESSURE: 104 MMHG | WEIGHT: 150 LBS | BODY MASS INDEX: 23 KG/M2

## 2021-02-17 DIAGNOSIS — I77.71 CAROTID ARTERY DISSECTION (HCC): ICD-10-CM

## 2021-02-17 DIAGNOSIS — I77.3 FIBROMUSCULAR DYSPLASIA OF CAROTID ARTERY (HCC): ICD-10-CM

## 2021-02-17 PROCEDURE — 3078F DIAST BP <80 MM HG: CPT | Performed by: OTHER

## 2021-02-17 PROCEDURE — 99214 OFFICE O/P EST MOD 30 MIN: CPT | Performed by: OTHER

## 2021-02-17 PROCEDURE — 3074F SYST BP LT 130 MM HG: CPT | Performed by: OTHER

## 2021-02-17 RX ORDER — ASPIRIN 81 MG/1
81 TABLET, CHEWABLE ORAL DAILY
Qty: 30 TABLET | Refills: 0 | Status: SHIPPED | OUTPATIENT
Start: 2021-02-17 | End: 2021-03-18

## 2021-02-18 NOTE — PROGRESS NOTES
HPI:    Patient ID: Delio Pierre is a 53year old female. PCP: Dr Uzair Mora    Patient presents for follow up and to discuss the CTA and MRI results. She is doing well and headaches have improved significantly. No further episode of severe headache.  CTA hea SENTINEL LYMPH NODE BIOPSY Right 6/29/2016    Performed by Brielle Johnson MD at [facility] MAIN OR   • GRAFTING FAT BREAST N/A 1/13/2017    Performed by Rory Ku MD at [address] [place] [address]   • GRAFTING FAT BREAST Bilateral 9/30/2016    Performed by regularly since 2010         Review of Systems   Constitutional: Negative. HENT: Negative. Eyes: Negative. Negative for visual disturbance. Respiratory: Negative. Cardiovascular: Negative. Gastrointestinal: Negative. Endocrine: Negative. Soft, non-distended, non-tender, with no rebound or guarding. Neurological  Mental Status Exam: Patient is awake, alert and oriented to person, place and time with normal memory, fund of knowledge, attention/concentration and language.     Cranial Nerves dissection in the region of interest of the distal cervical segment and petrous segment of the left internal carotid artery.           ASSESSMENT/PLAN:     (I77.3) Fibromuscular dysplasia of carotid artery (HCC)  (primary encounter diagnosis)    (I77.71) Ca

## 2021-02-26 ENCOUNTER — APPOINTMENT (OUTPATIENT)
Dept: HEMATOLOGY/ONCOLOGY | Facility: HOSPITAL | Age: 51
End: 2021-02-26
Attending: SPECIALIST
Payer: COMMERCIAL

## 2021-03-01 NOTE — PROGRESS NOTES
[facility] Progress Note      Patient Name: Lissette Lemon   YOB: 1970  Medical Record Number: [id number]  Consulting Physician: Tyler Awad M.D.        Date of Visit: 3/2/2021      Chief Complaint  Invasive ductal c of 12/2016 due to severe depression that began with the start of tamoxifen. Patient failed to follow up between 07/2017 and 01/2019. History of Present Illness  Patient returns for follow up. She feels well.  She denies any self palpated chest wall Rfl:     •  Multiple Vitamin (DAILY AVIS) Oral Tab, Take 1 tablet by mouth daily. , Disp: , Rfl:     Allergies   Ms. Kaci Sheets has No Known Allergies.     Vital Signs   /85 (BP Location: Left arm, Patient Position: Sitting, Cuff Size: adult)   Pulse 75   T with her primary care physician. Electronically signed by:    Tyler Awad M.D.   Associate Medical Director of Oncology [facility], [place]

## 2021-03-02 ENCOUNTER — OFFICE VISIT (OUTPATIENT)
Dept: HEMATOLOGY/ONCOLOGY | Facility: HOSPITAL | Age: 51
End: 2021-03-02
Attending: SPECIALIST
Payer: COMMERCIAL

## 2021-03-02 VITALS
WEIGHT: 147 LBS | OXYGEN SATURATION: 97 % | BODY MASS INDEX: 22.54 KG/M2 | RESPIRATION RATE: 16 BRPM | SYSTOLIC BLOOD PRESSURE: 126 MMHG | TEMPERATURE: 98 F | DIASTOLIC BLOOD PRESSURE: 85 MMHG | HEIGHT: 67.76 IN | HEART RATE: 75 BPM

## 2021-03-02 DIAGNOSIS — C50.811 MALIGNANT NEOPLASM OF OVERLAPPING SITES OF RIGHT BREAST IN FEMALE, ESTROGEN RECEPTOR POSITIVE (HCC): ICD-10-CM

## 2021-03-02 DIAGNOSIS — Z17.0 MALIGNANT NEOPLASM OF OVERLAPPING SITES OF RIGHT BREAST IN FEMALE, ESTROGEN RECEPTOR POSITIVE (HCC): ICD-10-CM

## 2021-03-02 DIAGNOSIS — Z90.13 ACQUIRED ABSENCE OF BILATERAL BREASTS AND NIPPLES: ICD-10-CM

## 2021-03-02 PROCEDURE — 99212 OFFICE O/P EST SF 10 MIN: CPT | Performed by: SPECIALIST

## 2021-03-02 NOTE — PROGRESS NOTES
Patient is here today for follow up with Danae Ku for Breast Cancer. Patient denies pain. Stated she is feeling good. Medication list and medical history were reviewed and updated.      Education Record    Learner:  Patient    Disease / Diagnosis: Breast

## 2021-03-12 DIAGNOSIS — Z23 NEED FOR VACCINATION: ICD-10-CM

## 2021-03-18 DIAGNOSIS — I77.71 CAROTID ARTERY DISSECTION (HCC): Primary | ICD-10-CM

## 2021-03-18 NOTE — TELEPHONE ENCOUNTER
Medication: ASPIRIN 81 MG Oral Chew Tab    Date of last refill: 02/17/2021 (#30/0)  [place]    Last office visit: 02/17/2021  Due back to clinic per last office note:  Around 08/17/2021  Date next office visit schedu

## 2021-03-19 RX ORDER — ASPIRIN 81 MG/1
TABLET, CHEWABLE ORAL
Qty: 90 TABLET | Refills: 0 | Status: SHIPPED | OUTPATIENT
Start: 2021-03-19 | End: 2021-08-27

## 2021-05-19 ENCOUNTER — OFFICE VISIT (OUTPATIENT)
Dept: SURGERY | Facility: CLINIC | Age: 51
End: 2021-05-19
Payer: COMMERCIAL

## 2021-05-19 DIAGNOSIS — Z90.13 ACQUIRED ABSENCE OF BILATERAL BREASTS AND NIPPLES: Primary | ICD-10-CM

## 2021-05-19 PROCEDURE — 99213 OFFICE O/P EST LOW 20 MIN: CPT | Performed by: SURGERY

## 2021-05-20 NOTE — CONSULTS
Estabilshed Patient Consultation    Chief Complaint: s p body contouring  History of Present Illness:   Sury Mayorga is a 53year old female who returns to the office after BL breast reconstruction revision and fat grafting.   1.       Removal of bilatera 5/19/21 encounter (Office Visit) with Camelia Bee MD.        Allergies:    No Known Allergies      Family History:   Family History   Problem Relation Age of Onset   • Lipids Father    • Heart Disorder Maternal Grandmother         CHF at age 80   • Oth from fat grafting but will have to be evaluated. We discussed R breast  revision with capsulorrhaphy , if intact use of same implant, excision of any excess IMF skin if needed. The different treatment options were discussed with the patient.   The luisana

## 2021-05-21 ENCOUNTER — TELEPHONE (OUTPATIENT)
Dept: INTERNAL MEDICINE CLINIC | Facility: CLINIC | Age: 51
End: 2021-05-21

## 2021-05-21 NOTE — TELEPHONE ENCOUNTER
Patient provided with MD instructions listed below and verbalized understanding.      Future Appointments   Date Time Provider Princess Mendez   6/7/2021  1:40 PM MD BENJAMIN Roberts [facility]   6/15/2021  3:00 PM MD BENJAMIN Roberts

## 2021-05-21 NOTE — TELEPHONE ENCOUNTER
: Merary Almendarez MD (Physician)        Patient needs to schedule a pre-op exam. Lab orders are placed.

## 2021-06-10 ENCOUNTER — HOSPITAL ENCOUNTER (OUTPATIENT)
Dept: ULTRASOUND IMAGING | Age: 51
Discharge: HOME | End: 2021-06-10
Attending: SURGERY
Payer: COMMERCIAL

## 2021-06-10 DIAGNOSIS — Z90.13 ACQUIRED ABSENCE OF BILATERAL BREASTS AND NIPPLES: ICD-10-CM

## 2021-06-10 PROCEDURE — 76641 ULTRASOUND BREAST COMPLETE: CPT | Performed by: SURGERY

## 2021-06-10 PROCEDURE — 76642 ULTRASOUND BREAST LIMITED: CPT | Performed by: SURGERY

## 2021-06-10 NOTE — IMAGING NOTE
This Breast Care RN assisted Dr. Kay Michelle with recommendation for a right breast ultrasound guided biopsy for palpable mass near sternum. Patient has a h/o right breast cancer, bilateral mastectomies, and implants.   Procedure reviewed and all questions an

## 2021-06-23 ENCOUNTER — HOSPITAL ENCOUNTER (OUTPATIENT)
Dept: MAMMOGRAPHY | Facility: HOSPITAL | Age: 51
Discharge: HOME | End: 2021-06-23
Attending: SURGERY
Payer: COMMERCIAL

## 2021-06-23 DIAGNOSIS — N63.0 BREAST LUMP: ICD-10-CM

## 2021-06-23 PROCEDURE — 19083 BX BREAST 1ST LESION US IMAG: CPT | Performed by: SURGERY

## 2021-06-23 PROCEDURE — 88360 TUMOR IMMUNOHISTOCHEM/MANUAL: CPT | Performed by: SURGERY

## 2021-06-23 PROCEDURE — 88305 TISSUE EXAM BY PATHOLOGIST: CPT | Performed by: SURGERY

## 2021-06-25 ENCOUNTER — NURSE NAVIGATOR ENCOUNTER (OUTPATIENT)
Dept: HEMATOLOGY/ONCOLOGY | Facility: HOSPITAL | Age: 51
End: 2021-06-25

## 2021-06-25 NOTE — PROGRESS NOTES
Phoned patient to offer appointment with Dr. Michael Mcwilliams on 6/29 at 9 am, will schedule this. Contact information provided to patient and requested return phone call.

## 2021-06-25 NOTE — PROGRESS NOTES
Pt returned phone call. We discussed newly diagnosed breast cancer. Markers are pending- navigator did speak with pathology, these should be resulted later today. We discussed multidisciplinary conference and that her case will be discussed.  Nadia barbour

## 2021-06-28 ENCOUNTER — TELEPHONE (OUTPATIENT)
Dept: SURGERY | Facility: CLINIC | Age: 51
End: 2021-06-28

## 2021-06-28 ENCOUNTER — NURSE NAVIGATOR ENCOUNTER (OUTPATIENT)
Dept: HEMATOLOGY/ONCOLOGY | Facility: HOSPITAL | Age: 51
End: 2021-06-28

## 2021-06-28 NOTE — TELEPHONE ENCOUNTER
I spoke with pt and she is seeing DR Carla Núñez tomorrow to discuss treatment options. When I spoke with Dr Carla Núñez he explained to me that she will need staging and probably excision followed by XRT.  The pt will see me in the office after her staging for a p

## 2021-06-28 NOTE — PROGRESS NOTES
[facility] Dr. Guicho Copeland. Awaiting phone call back from patient.

## 2021-06-29 ENCOUNTER — SOCIAL WORK SERVICES (OUTPATIENT)
Dept: HEMATOLOGY/ONCOLOGY | Facility: HOSPITAL | Age: 51
End: 2021-06-29

## 2021-06-29 ENCOUNTER — APPOINTMENT (OUTPATIENT)
Dept: HEMATOLOGY/ONCOLOGY | Facility: HOSPITAL | Age: 51
End: 2021-06-29
Attending: SPECIALIST
Payer: COMMERCIAL

## 2021-06-29 ENCOUNTER — NURSE NAVIGATOR ENCOUNTER (OUTPATIENT)
Dept: HEMATOLOGY/ONCOLOGY | Facility: HOSPITAL | Age: 51
End: 2021-06-29

## 2021-06-29 ENCOUNTER — OFFICE VISIT (OUTPATIENT)
Dept: SURGERY | Facility: CLINIC | Age: 51
End: 2021-06-29
Payer: COMMERCIAL

## 2021-06-29 VITALS
BODY MASS INDEX: 21.87 KG/M2 | OXYGEN SATURATION: 98 % | WEIGHT: 142.63 LBS | RESPIRATION RATE: 16 BRPM | HEIGHT: 67.76 IN | HEART RATE: 69 BPM | DIASTOLIC BLOOD PRESSURE: 74 MMHG | SYSTOLIC BLOOD PRESSURE: 111 MMHG | TEMPERATURE: 98 F

## 2021-06-29 DIAGNOSIS — C50.811 MALIGNANT NEOPLASM OF OVERLAPPING SITES OF RIGHT BREAST IN FEMALE, ESTROGEN RECEPTOR POSITIVE (HCC): Primary | ICD-10-CM

## 2021-06-29 DIAGNOSIS — Z17.0 MALIGNANT NEOPLASM OF OVERLAPPING SITES OF RIGHT BREAST IN FEMALE, ESTROGEN RECEPTOR POSITIVE (HCC): Primary | ICD-10-CM

## 2021-06-29 DIAGNOSIS — C50.911 RECURRENT BREAST CANCER, RIGHT (HCC): Primary | ICD-10-CM

## 2021-06-29 DIAGNOSIS — C50.911 RECURRENT MALIGNANT NEOPLASM OF RIGHT BREAST (HCC): ICD-10-CM

## 2021-06-29 DIAGNOSIS — Z90.13 ACQUIRED ABSENCE OF BILATERAL BREASTS AND NIPPLES: ICD-10-CM

## 2021-06-29 PROCEDURE — 3078F DIAST BP <80 MM HG: CPT | Performed by: SURGERY

## 2021-06-29 PROCEDURE — 3008F BODY MASS INDEX DOCD: CPT | Performed by: SURGERY

## 2021-06-29 PROCEDURE — 3074F SYST BP LT 130 MM HG: CPT | Performed by: SPECIALIST

## 2021-06-29 PROCEDURE — 99245 OFF/OP CONSLTJ NEW/EST HI 55: CPT | Performed by: SURGERY

## 2021-06-29 PROCEDURE — 3078F DIAST BP <80 MM HG: CPT | Performed by: SPECIALIST

## 2021-06-29 PROCEDURE — 3074F SYST BP LT 130 MM HG: CPT | Performed by: SURGERY

## 2021-06-29 PROCEDURE — 3008F BODY MASS INDEX DOCD: CPT | Performed by: SPECIALIST

## 2021-06-29 PROCEDURE — 99215 OFFICE O/P EST HI 40 MIN: CPT | Performed by: SPECIALIST

## 2021-06-29 NOTE — PROGRESS NOTES
met with Patient and  in Surgery for PHQ9 score of 13 (0 on #9). Patient stated that she just found out her results from [address] on Thursday, which was shocking to not be able to talk with the Doctor right away.  She is has spoken with

## 2021-06-29 NOTE — PROGRESS NOTES
Met with patient following appointment with Dr. Colette Valentin. We discussed plan of care. Pt is going to have CT and bone scan as scheduled for her on 7/1, oral contrast given today. Pt has axillary US scheduled for 7/2.  Pt was given the new patient paperwork fo

## 2021-06-29 NOTE — PROGRESS NOTES
Breast Surgery New Patient Consultation    This is the first visit for this 49year old woman, referred by Dr. Yonny Cid, who presents for evaluation of recurrent right breast cancer.     History of Present Illness:   Ms. Sussy Oseguera is a 49year old woman 6/29/2016    Procedure: BREAST RECONSTRUCTION/ IMMEDIATE/EXPANDER;  Surgeon: Garry Ramos MD;  Location: [facility] MAIN OR   • UMBILICAL HERNIA REPAIR  3/5/2014    Procedure:  HERNIA UMBILICAL REPAIR ADULT;  Surgeon: Barb Gilmore MD;  Location: [facility] MAIN redness, glaucoma, yellowing of the eyes, change in vision, color blindness, or+ wearing contacts/glasses.  The patient denies hearing loss, ringing in the ears, ear drainage, earaches, nasal congestion, nose bleeds, +snoring, pain in mouth/throat, hoarsene walking, weakness, tingling or burning in hands/feet. There is no history of abusive relationship, bipolar disorder, sleep disturbance, +anxiety,+ depression or+ feeling of despair. Endocrine:     There is no history of poor/slow wound healing, weight lo rashes or lesions. Extremities: The extremities are without deformity, cyanosis or edema.     Impression:   Ms. Stephanie Hollingsworth is a 49year old woman presents with recurrent right breast cancer  Cancer Staging  Malignant neoplasm of overlapping sites of

## 2021-06-29 NOTE — PROGRESS NOTES
[facility] Progress Note      Patient Name: Emma Kim   YOB: 1970  Medical Record Number: [id number]  Consulting Physician: Brianne Valentin M.D.        Date of Visit: 6/29/2021      Chief Complaint  Invasive ductal of 12/2016 due to severe depression that began with the start of tamoxifen. Patient failed to follow up between 07/2017 and 01/2019. On 05/19/2021 she was seen by plastic surgery as follow up to her revision surgery.  She informed Dr. Saeid Carr of a sma 30 tablet, Rfl: 0  ASPIRIN 81 MG Oral Chew Tab, CHEW AND SWALLOW 1 TABLET(81 MG) BY MOUTH DAILY, Disp: 90 tablet, Rfl: 0  hydrOXYzine HCl 25 MG Oral Tab, Take 1 tablet (25 mg total) by mouth 3 (three) times daily as needed for Anxiety. , Disp: 90 tablet, Rf INDICATIONS:  Z90.13 Acquired absence of bilateral breasts and nipples       TECHNIQUE:  Right breast ultrasound was performed, evaluating specific areas of concern.  The breast was NOT scanned in entirety.                 FINDINGS:  Patient is status po ductal carcinoma. -Grade 2 (Tubules: 3, Nuclei: 2, Mitoses: 1).         -Largest focus of tumor measures 11 mm (1.1 cm); present in 80% of submitted tissue.   -Small focus of degenerated skeletal muscle.  -Tumor does not appear to invade skeletal mus surgery today. Electronically signed by:    Wilson Alvarado M.D.   Associate Medical Director of Oncology [facility], [place]

## 2021-06-30 ENCOUNTER — OFFICE VISIT (OUTPATIENT)
Dept: SURGERY | Facility: CLINIC | Age: 51
End: 2021-06-30
Payer: COMMERCIAL

## 2021-06-30 ENCOUNTER — OFFICE VISIT (OUTPATIENT)
Dept: HEMATOLOGY/ONCOLOGY | Age: 51
End: 2021-06-30
Attending: SPECIALIST
Payer: COMMERCIAL

## 2021-06-30 VITALS
RESPIRATION RATE: 16 BRPM | DIASTOLIC BLOOD PRESSURE: 74 MMHG | HEIGHT: 67.76 IN | TEMPERATURE: 98 F | WEIGHT: 142.63 LBS | BODY MASS INDEX: 21.87 KG/M2 | SYSTOLIC BLOOD PRESSURE: 111 MMHG | HEART RATE: 69 BPM | OXYGEN SATURATION: 98 %

## 2021-06-30 DIAGNOSIS — C50.811 MALIGNANT NEOPLASM OF OVERLAPPING SITES OF RIGHT BREAST IN FEMALE, ESTROGEN RECEPTOR POSITIVE (HCC): Primary | ICD-10-CM

## 2021-06-30 DIAGNOSIS — Z90.13 ACQUIRED ABSENCE OF BILATERAL BREASTS AND NIPPLES: Primary | ICD-10-CM

## 2021-06-30 DIAGNOSIS — Z90.13 ACQUIRED ABSENCE OF BILATERAL BREASTS AND NIPPLES: ICD-10-CM

## 2021-06-30 DIAGNOSIS — Z17.0 MALIGNANT NEOPLASM OF OVERLAPPING SITES OF RIGHT BREAST IN FEMALE, ESTROGEN RECEPTOR POSITIVE (HCC): Primary | ICD-10-CM

## 2021-06-30 PROCEDURE — 99213 OFFICE O/P EST LOW 20 MIN: CPT | Performed by: SURGERY

## 2021-06-30 RX ORDER — ACETAMINOPHEN 500 MG
1000 TABLET ORAL ONCE
Status: CANCELLED | OUTPATIENT
Start: 2021-06-30 | End: 2021-06-30

## 2021-06-30 NOTE — CONSULTS
Estabilshed Patient Consultation    Chief Complaint: recurrent R breast cancer, absence of both breasts    History of Present Illness:   Stephanie Hollingsworth is a 49year old female who returns to the office after being diagnosed with recurrent breast cancer R. Prostate, agent orange   • Anxiety Maternal Uncle          Social History:    Alcohol use Yes 1.0 - 4.0 standard drinks/week 1 - 4 Standard drinks or equivalent per week   Comment: 7-9 drinks per week; has a history of self medicating with alcohol but skin if needed, this will most likely be adjusted 6-8 months after XRT if still needed.        The different treatment options were discussed with the patient.  The procedures and the postoperative care was discussed in detail.  Potential risks complicatio

## 2021-06-30 NOTE — PATIENT INSTRUCTIONS
Surgeon:              Dr. Imer Norton.  Vicky Basilio, PhD                                          Tel:         [phone]                                  Fax:        [phone]    Surgery/Procedure:   Right Breast reconstruction with possible ADM, possible implan

## 2021-07-01 ENCOUNTER — TELEPHONE (OUTPATIENT)
Dept: MAMMOGRAPHY | Age: 51
End: 2021-07-01

## 2021-07-01 ENCOUNTER — HOSPITAL ENCOUNTER (OUTPATIENT)
Dept: CT IMAGING | Facility: HOSPITAL | Age: 51
Discharge: HOME | End: 2021-07-01
Attending: SPECIALIST
Payer: COMMERCIAL

## 2021-07-01 ENCOUNTER — HOSPITAL ENCOUNTER (OUTPATIENT)
Dept: NUCLEAR MEDICINE | Facility: HOSPITAL | Age: 51
Discharge: HOME | End: 2021-07-01
Attending: SPECIALIST
Payer: COMMERCIAL

## 2021-07-01 DIAGNOSIS — Z17.0 MALIGNANT NEOPLASM OF OVERLAPPING SITES OF RIGHT BREAST IN FEMALE, ESTROGEN RECEPTOR POSITIVE (HCC): ICD-10-CM

## 2021-07-01 DIAGNOSIS — C50.811 MALIGNANT NEOPLASM OF OVERLAPPING SITES OF RIGHT BREAST IN FEMALE, ESTROGEN RECEPTOR POSITIVE (HCC): ICD-10-CM

## 2021-07-01 PROCEDURE — 71260 CT THORAX DX C+: CPT | Performed by: SPECIALIST

## 2021-07-01 PROCEDURE — 74177 CT ABD & PELVIS W/CONTRAST: CPT | Performed by: SPECIALIST

## 2021-07-01 PROCEDURE — 78306 BONE IMAGING WHOLE BODY: CPT | Performed by: SPECIALIST

## 2021-07-02 ENCOUNTER — HOSPITAL ENCOUNTER (OUTPATIENT)
Dept: MAMMOGRAPHY | Facility: HOSPITAL | Age: 51
Discharge: HOME | End: 2021-07-02
Attending: SPECIALIST
Payer: COMMERCIAL

## 2021-07-02 DIAGNOSIS — C50.811 MALIGNANT NEOPLASM OF OVERLAPPING SITES OF RIGHT BREAST IN FEMALE, ESTROGEN RECEPTOR POSITIVE (HCC): ICD-10-CM

## 2021-07-02 DIAGNOSIS — Z90.13 ACQUIRED ABSENCE OF BILATERAL BREASTS AND NIPPLES: Primary | ICD-10-CM

## 2021-07-02 DIAGNOSIS — C50.911 RECURRENT MALIGNANT NEOPLASM OF RIGHT BREAST (HCC): ICD-10-CM

## 2021-07-02 DIAGNOSIS — Z17.0 MALIGNANT NEOPLASM OF OVERLAPPING SITES OF RIGHT BREAST IN FEMALE, ESTROGEN RECEPTOR POSITIVE (HCC): ICD-10-CM

## 2021-07-02 PROCEDURE — 76882 US LMTD JT/FCL EVL NVASC XTR: CPT | Performed by: SPECIALIST

## 2021-07-06 ENCOUNTER — ANESTHESIA EVENT (OUTPATIENT)
Dept: SURGERY | Facility: HOSPITAL | Age: 51
End: 2021-07-06
Payer: COMMERCIAL

## 2021-07-06 ENCOUNTER — HOSPITAL ENCOUNTER (OUTPATIENT)
Facility: HOSPITAL | Age: 51
Setting detail: HOSPITAL OUTPATIENT SURGERY
Discharge: HOME | End: 2021-07-06
Attending: SURGERY | Admitting: SURGERY
Payer: COMMERCIAL

## 2021-07-06 ENCOUNTER — HOSPITAL ENCOUNTER (OUTPATIENT)
Dept: MAMMOGRAPHY | Facility: HOSPITAL | Age: 51
Discharge: HOME | End: 2021-07-06
Attending: SURGERY
Payer: COMMERCIAL

## 2021-07-06 ENCOUNTER — ANESTHESIA (OUTPATIENT)
Dept: SURGERY | Facility: HOSPITAL | Age: 51
End: 2021-07-06
Payer: COMMERCIAL

## 2021-07-06 VITALS
DIASTOLIC BLOOD PRESSURE: 69 MMHG | SYSTOLIC BLOOD PRESSURE: 111 MMHG | HEIGHT: 68 IN | BODY MASS INDEX: 21.28 KG/M2 | RESPIRATION RATE: 16 BRPM | WEIGHT: 140.44 LBS | HEART RATE: 55 BPM | OXYGEN SATURATION: 100 % | TEMPERATURE: 97 F

## 2021-07-06 DIAGNOSIS — C50.811 MALIGNANT NEOPLASM OF OVERLAPPING SITES OF RIGHT BREAST IN FEMALE, ESTROGEN RECEPTOR POSITIVE (HCC): ICD-10-CM

## 2021-07-06 DIAGNOSIS — Z17.0 MALIGNANT NEOPLASM OF OVERLAPPING SITES OF RIGHT BREAST IN FEMALE, ESTROGEN RECEPTOR POSITIVE (HCC): ICD-10-CM

## 2021-07-06 DIAGNOSIS — Z90.13 ACQUIRED ABSENCE OF BILATERAL BREASTS AND NIPPLES: ICD-10-CM

## 2021-07-06 LAB — B-HCG UR QL: NEGATIVE

## 2021-07-06 PROCEDURE — 88305 TISSUE EXAM BY PATHOLOGIST: CPT | Performed by: SURGERY

## 2021-07-06 PROCEDURE — 0HBT0ZZ EXCISION OF RIGHT BREAST, OPEN APPROACH: ICD-10-PCS | Performed by: SURGERY

## 2021-07-06 PROCEDURE — 76098 X-RAY EXAM SURGICAL SPECIMEN: CPT | Performed by: SURGERY

## 2021-07-06 PROCEDURE — 19285 PERQ DEV BREAST 1ST US IMAG: CPT | Performed by: SURGERY

## 2021-07-06 PROCEDURE — 88342 IMHCHEM/IMCYTCHM 1ST ANTB: CPT | Performed by: SURGERY

## 2021-07-06 PROCEDURE — 0HQ5XZZ REPAIR CHEST SKIN, EXTERNAL APPROACH: ICD-10-PCS | Performed by: SURGERY

## 2021-07-06 PROCEDURE — 81025 URINE PREGNANCY TEST: CPT

## 2021-07-06 PROCEDURE — 88307 TISSUE EXAM BY PATHOLOGIST: CPT | Performed by: SURGERY

## 2021-07-06 RX ORDER — DIAZEPAM 5 MG/1
5 TABLET ORAL AS NEEDED
Status: DISCONTINUED | OUTPATIENT
Start: 2021-07-06 | End: 2021-07-06 | Stop reason: HOSPADM

## 2021-07-06 RX ORDER — CEFAZOLIN SODIUM/WATER 2 G/20 ML
2 SYRINGE (ML) INTRAVENOUS ONCE
Status: COMPLETED | OUTPATIENT
Start: 2021-07-06 | End: 2021-07-06

## 2021-07-06 RX ORDER — MIDAZOLAM HYDROCHLORIDE 1 MG/ML
INJECTION INTRAMUSCULAR; INTRAVENOUS AS NEEDED
Status: DISCONTINUED | OUTPATIENT
Start: 2021-07-06 | End: 2021-07-07 | Stop reason: SURG

## 2021-07-06 RX ORDER — LIDOCAINE HYDROCHLORIDE AND EPINEPHRINE 10; 10 MG/ML; UG/ML
INJECTION, SOLUTION INFILTRATION; PERINEURAL AS NEEDED
Status: DISCONTINUED | OUTPATIENT
Start: 2021-07-06 | End: 2021-07-06 | Stop reason: HOSPADM

## 2021-07-06 RX ORDER — HYDROMORPHONE HYDROCHLORIDE 1 MG/ML
0.4 INJECTION, SOLUTION INTRAMUSCULAR; INTRAVENOUS; SUBCUTANEOUS EVERY 5 MIN PRN
Status: DISCONTINUED | OUTPATIENT
Start: 2021-07-06 | End: 2021-07-06

## 2021-07-06 RX ORDER — SODIUM CHLORIDE, SODIUM LACTATE, POTASSIUM CHLORIDE, CALCIUM CHLORIDE 600; 310; 30; 20 MG/100ML; MG/100ML; MG/100ML; MG/100ML
INJECTION, SOLUTION INTRAVENOUS CONTINUOUS
Status: DISCONTINUED | OUTPATIENT
Start: 2021-07-06 | End: 2021-07-06

## 2021-07-06 RX ORDER — HYDROCODONE BITARTRATE AND ACETAMINOPHEN 5; 325 MG/1; MG/1
2 TABLET ORAL AS NEEDED
Status: DISCONTINUED | OUTPATIENT
Start: 2021-07-06 | End: 2021-07-06

## 2021-07-06 RX ORDER — LIDOCAINE HYDROCHLORIDE 10 MG/ML
INJECTION, SOLUTION EPIDURAL; INFILTRATION; INTRACAUDAL; PERINEURAL AS NEEDED
Status: DISCONTINUED | OUTPATIENT
Start: 2021-07-06 | End: 2021-07-07 | Stop reason: SURG

## 2021-07-06 RX ORDER — HYDROCODONE BITARTRATE AND ACETAMINOPHEN 5; 325 MG/1; MG/1
1 TABLET ORAL AS NEEDED
Status: DISCONTINUED | OUTPATIENT
Start: 2021-07-06 | End: 2021-07-06

## 2021-07-06 RX ORDER — PHENYLEPHRINE HCL 10 MG/ML
VIAL (ML) INJECTION AS NEEDED
Status: DISCONTINUED | OUTPATIENT
Start: 2021-07-06 | End: 2021-07-07 | Stop reason: SURG

## 2021-07-06 RX ORDER — DEXAMETHASONE SODIUM PHOSPHATE 4 MG/ML
VIAL (ML) INJECTION AS NEEDED
Status: DISCONTINUED | OUTPATIENT
Start: 2021-07-06 | End: 2021-07-07 | Stop reason: SURG

## 2021-07-06 RX ORDER — ROCURONIUM BROMIDE 10 MG/ML
INJECTION, SOLUTION INTRAVENOUS AS NEEDED
Status: DISCONTINUED | OUTPATIENT
Start: 2021-07-06 | End: 2021-07-07 | Stop reason: SURG

## 2021-07-06 RX ORDER — ACETAMINOPHEN 500 MG
1000 TABLET ORAL ONCE
COMMUNITY
End: 2021-08-12 | Stop reason: ALTCHOICE

## 2021-07-06 RX ORDER — ONDANSETRON 2 MG/ML
INJECTION INTRAMUSCULAR; INTRAVENOUS
Status: COMPLETED
Start: 2021-07-06 | End: 2021-07-06

## 2021-07-06 RX ORDER — NEOSTIGMINE METHYLSULFATE 1 MG/ML
INJECTION INTRAVENOUS AS NEEDED
Status: DISCONTINUED | OUTPATIENT
Start: 2021-07-06 | End: 2021-07-07 | Stop reason: SURG

## 2021-07-06 RX ORDER — NALOXONE HYDROCHLORIDE 0.4 MG/ML
80 INJECTION, SOLUTION INTRAMUSCULAR; INTRAVENOUS; SUBCUTANEOUS AS NEEDED
Status: DISCONTINUED | OUTPATIENT
Start: 2021-07-06 | End: 2021-07-06

## 2021-07-06 RX ORDER — CEFAZOLIN SODIUM/WATER 2 G/20 ML
SYRINGE (ML) INTRAVENOUS
Status: DISCONTINUED
Start: 2021-07-06 | End: 2021-07-06

## 2021-07-06 RX ORDER — ONDANSETRON 2 MG/ML
4 INJECTION INTRAMUSCULAR; INTRAVENOUS AS NEEDED
Status: DISCONTINUED | OUTPATIENT
Start: 2021-07-06 | End: 2021-07-06

## 2021-07-06 RX ORDER — GLYCOPYRROLATE 0.2 MG/ML
INJECTION, SOLUTION INTRAMUSCULAR; INTRAVENOUS AS NEEDED
Status: DISCONTINUED | OUTPATIENT
Start: 2021-07-06 | End: 2021-07-07 | Stop reason: SURG

## 2021-07-06 RX ORDER — HYDROCODONE BITARTRATE AND ACETAMINOPHEN 5; 325 MG/1; MG/1
1-2 TABLET ORAL EVERY 4 HOURS PRN
Qty: 10 TABLET | Refills: 0 | Status: SHIPPED | OUTPATIENT
Start: 2021-07-06 | End: 2021-08-12 | Stop reason: ALTCHOICE

## 2021-07-06 RX ORDER — ONDANSETRON 2 MG/ML
INJECTION INTRAMUSCULAR; INTRAVENOUS AS NEEDED
Status: DISCONTINUED | OUTPATIENT
Start: 2021-07-06 | End: 2021-07-07 | Stop reason: SURG

## 2021-07-06 RX ADMIN — MIDAZOLAM HYDROCHLORIDE 2 MG: 1 INJECTION INTRAMUSCULAR; INTRAVENOUS at 16:05:00

## 2021-07-06 RX ADMIN — ROCURONIUM BROMIDE 20 MG: 10 INJECTION, SOLUTION INTRAVENOUS at 16:19:00

## 2021-07-06 RX ADMIN — ONDANSETRON 4 MG: 2 INJECTION INTRAMUSCULAR; INTRAVENOUS at 16:16:00

## 2021-07-06 RX ADMIN — LIDOCAINE HYDROCHLORIDE 25 MG: 10 INJECTION, SOLUTION EPIDURAL; INFILTRATION; INTRACAUDAL; PERINEURAL at 16:10:00

## 2021-07-06 RX ADMIN — SODIUM CHLORIDE, SODIUM LACTATE, POTASSIUM CHLORIDE, CALCIUM CHLORIDE: 600; 310; 30; 20 INJECTION, SOLUTION INTRAVENOUS at 17:15:00

## 2021-07-06 RX ADMIN — GLYCOPYRROLATE 0.4 MG: 0.2 INJECTION, SOLUTION INTRAMUSCULAR; INTRAVENOUS at 16:59:00

## 2021-07-06 RX ADMIN — PHENYLEPHRINE HCL 100 MCG: 10 MG/ML VIAL (ML) INJECTION at 16:54:00

## 2021-07-06 RX ADMIN — DEXAMETHASONE SODIUM PHOSPHATE 4 MG: 4 MG/ML VIAL (ML) INJECTION at 16:16:00

## 2021-07-06 RX ADMIN — CEFAZOLIN SODIUM/WATER 2 G: 2 G/20 ML SYRINGE (ML) INTRAVENOUS at 16:24:00

## 2021-07-06 RX ADMIN — NEOSTIGMINE METHYLSULFATE 3 MG: 1 INJECTION INTRAVENOUS at 16:59:00

## 2021-07-06 NOTE — H&P
History of Present Illness:   Ms. Roberto Rader is a 49year old woman who presents with a personal history of right breast cancer that was initially treated in 2016 with bilateral mastectomies with new self detected palpable mass.   She reports that she n 3/5/2014     Procedure: HERNIA UMBILICAL REPAIR ADULT;  Surgeon: Edvin Alcantara MD;  Location: [address] [place] [address] MAIN OR         Gynecological History:  Pt is a   Pt was 28years old at time of first pregnancy.     She has cumulative breastfeeding history of 26 horacio denies hearing loss, ringing in the ears, ear drainage, earaches, nasal congestion, nose bleeds, +snoring, pain in mouth/throat, hoarseness, change in voice, facial trauma.     Respiratory:  The patient denies chronic cough, phlegm, hemoptysis, pleurisy/ch sleep disturbance, +anxiety,+ depression or+ feeling of despair.     Endocrine:     There is no history of poor/slow wound healing, weight loss/gain, fertility or hormone problems, cold intolerance, thyroid disease.      Allergic/Immunologic:  There is no h edema.     Impression:   Ms. Mercedes Velez is a 49year old woman presents with recurrent right breast cancer    Malignant neoplasm of overlapping sites of right female breast [facility])  Staging form: Breast, AJCC V7  - Clinical stage from 7/28/2016: Stage IA performed. I discussed with the patient and/or legal representative the potential benefits, risks and side effects of this procedure; the likelihood of the patient achieving goals; and potential problems that might occur during recuperation.   I discussed

## 2021-07-06 NOTE — BRIEF OP NOTE
Pre-Operative Diagnosis: Acquired absence of bilateral breasts and nipples [Z90.13]  Recurrent malignant neoplasm of right breast (HCC)  C50.911     Post-Operative Diagnosis: Acquired absence of bilateral breasts and nipples [Z90.13]Recurrent malignant jany

## 2021-07-06 NOTE — ANESTHESIA PREPROCEDURE EVALUATION
PRE-OP EVALUATION    Patient Name: Tone Needle    Admit Diagnosis: Acquired absence of bilateral breasts and nipples [Z90.13]  Recurrent malignant neoplasm of right breast ([facility] [address]  C50.911    Pre-op Diagnosis: Acquired absence of bilateral breasts and nip Take 1 tablet (50 mg total) by mouth nightly., Disp: 30 tablet, Rfl: 0  lamoTRIgine 100 MG Oral Tab, Take 1 tablet (100 mg total) by mouth daily.  (Patient taking differently: Take 75 mg by mouth daily.  ), Disp: 30 tablet, Rfl: 0, 7/6/2021 at 800  ASPIRIN SURGERY   • OTHER SURGICAL HISTORY Right 6/29/2016    Procedure: BREAST SENTINEL LYMPH NODE BIOPSY;  Surgeon: Teresa Abbasi MD;  Location: [facility] MAIN OR   • OTHER SURGICAL HISTORY Bilateral 6/29/2016    Procedure: BREAST RECONSTRUCTION/ IMMEDIATE/EXPANDER; normal.                 Other findings            ASA: 2   Plan: general  NPO status verified and     Post-procedure pain management plan discussed with surgeon and patient.     Comment: Discussed risks and benefits of general anesthesia including malignant

## 2021-07-06 NOTE — ANESTHESIA PROCEDURE NOTES
Airway  Date/Time: 7/6/2021 4:12 PM  Urgency: elective      General Information and Staff    Patient location during procedure: OR  Anesthesiologist: Aries Godinez DO  Performed: anesthesiologist     Indications and Patient Condition  Indications for airway

## 2021-07-07 NOTE — ANESTHESIA POSTPROCEDURE EVALUATION
[facility] Patient Status:  Hospital Outpatient Surgery   Age/Gender 49year old female MRN [id number]   Location [address] [place] [address] Attending No att. providers found   Hosp Day # 0 PCP MD Mana Connor

## 2021-07-07 NOTE — OPERATIVE REPORT
[facility]    PATIENT'S NAME: EDDIE Dayannachidi Roland   ATTENDING PHYSICIAN: Junaid Obregon. Tabitha Martinez M.D. OPERATING PHYSICIAN: Amanda Eller MD   PATIENT ACCOUNT#:   [id number]    LOCATION:  [address] [place] [address] 1 [address] 10  MEDICAL RECORD #:   [id number] adequately padded. Sequential compression devices were applied. General endotracheal anesthesia was administered. Preoperative antibiotics were given. Then, her entire chest and breasts were prepped and draped in usual sterile fashion.   Then, Dr. Eh De La Garza

## 2021-07-09 NOTE — OPERATIVE REPORT
[address] [place]    PATIENT'S NAME: Deann MORGAN [facility]   ATTENDING PHYSICIAN: Kamari Lin. Farhat Carrington M.D. OPERATING PHYSICIAN: Kamari Lin. Farhat Carrington M.D.    PATIENT ACCOUNT#:   [de-identified]    LOCATION:  [place] [address] [address] 10  MEDICAL RECORD #:   [id number] via image guidance. She was then brought to the OR, placed in the supine position. She was properly padded and secured. She was given a dose of IV antibiotics. Sequential compression devices were applied to her legs for DVT prophylaxis.   General anesth

## 2021-07-14 NOTE — PROGRESS NOTES
Breast Surgery Post-Operative Visit    Diagnosis: Right breast cancer status post right chest wall wide local excision of carcinoma with complex wound closure on 7/6/21     Stage: Cancer Staging  Malignant neoplasm of overlapping sites of right female saravanan diagnosed 5/2016, bilateral mastectomy   • Depression    • Low back pain    • Visual impairment     glasses       Past Surgical History:   Procedure Laterality Date   • HERNIA SURGERY      umbilical hernia surgery again a few months ago   • IMPLANT LEFT MG) BY MOUTH DAILY, Disp: 90 tablet, Rfl: 0  hydrOXYzine HCl 25 MG Oral Tab, Take 1 tablet (25 mg total) by mouth 3 (three) times daily as needed for Anxiety. , Disp: 90 tablet, Rfl: 0  Calcium Polycarbophil (FIBER-CAPS OR), , Disp: , Rfl:   ibuprofen 200 M change in voice, facial trauma.     Respiratory:  The patient denies chronic cough, phlegm, hemoptysis, pleurisy/chest pain, pneumonia, asthma, wheezing, difficulty in breathing with exertion, emphysema, chronic bronchitis, shortness of breath or abnormal s fertility or hormone problems, cold intolerance, thyroid disease. Allergic/Immunologic:  There is no history of hives, hay fever, angioedema or anaphylaxis.     /66 (Patient Position: Sitting, Cuff Size: adult)   Pulse 78   Resp 16   Wt 63 kg (139 wound care. I encouraged her to continue monitoring her ROM and strength and explained that a referral to physical therapy may be warranted in the future if she identifies any limitations or restrictions.  She was given ample opportunity for questions and

## 2021-07-15 ENCOUNTER — OFFICE VISIT (OUTPATIENT)
Dept: SURGERY | Facility: CLINIC | Age: 51
End: 2021-07-15
Payer: COMMERCIAL

## 2021-07-15 VITALS
DIASTOLIC BLOOD PRESSURE: 66 MMHG | SYSTOLIC BLOOD PRESSURE: 107 MMHG | RESPIRATION RATE: 16 BRPM | HEART RATE: 78 BPM | BODY MASS INDEX: 21 KG/M2 | WEIGHT: 139 LBS

## 2021-07-15 DIAGNOSIS — Z90.13 ACQUIRED ABSENCE OF BILATERAL BREASTS AND NIPPLES: Primary | ICD-10-CM

## 2021-07-15 DIAGNOSIS — C50.911 RECURRENT BREAST CANCER, RIGHT (HCC): ICD-10-CM

## 2021-07-15 PROCEDURE — 99024 POSTOP FOLLOW-UP VISIT: CPT | Performed by: PHYSICIAN ASSISTANT

## 2021-07-15 PROCEDURE — 3078F DIAST BP <80 MM HG: CPT | Performed by: PHYSICIAN ASSISTANT

## 2021-07-15 PROCEDURE — 3074F SYST BP LT 130 MM HG: CPT | Performed by: PHYSICIAN ASSISTANT

## 2021-07-16 ENCOUNTER — HOSPITAL ENCOUNTER (OUTPATIENT)
Dept: RADIATION ONCOLOGY | Facility: HOSPITAL | Age: 51
Discharge: HOME | End: 2021-07-16
Attending: INTERNAL MEDICINE
Payer: COMMERCIAL

## 2021-07-16 ENCOUNTER — NURSE NAVIGATOR ENCOUNTER (OUTPATIENT)
Dept: HEMATOLOGY/ONCOLOGY | Facility: HOSPITAL | Age: 51
End: 2021-07-16

## 2021-07-16 VITALS
DIASTOLIC BLOOD PRESSURE: 71 MMHG | RESPIRATION RATE: 16 BRPM | SYSTOLIC BLOOD PRESSURE: 120 MMHG | TEMPERATURE: 98 F | HEIGHT: 68 IN | OXYGEN SATURATION: 99 % | BODY MASS INDEX: 20.97 KG/M2 | WEIGHT: 138.38 LBS | HEART RATE: 71 BPM

## 2021-07-16 DIAGNOSIS — Z17.0 MALIGNANT NEOPLASM OF OVERLAPPING SITES OF RIGHT BREAST IN FEMALE, ESTROGEN RECEPTOR POSITIVE (HCC): ICD-10-CM

## 2021-07-16 DIAGNOSIS — C50.811 MALIGNANT NEOPLASM OF OVERLAPPING SITES OF RIGHT BREAST IN FEMALE, ESTROGEN RECEPTOR POSITIVE (HCC): ICD-10-CM

## 2021-07-16 PROCEDURE — 99214 OFFICE O/P EST MOD 30 MIN: CPT

## 2021-07-16 NOTE — PROGRESS NOTES
Nursing Consultation Note  Patient: Chelsi Shah  YOB: 1970  Age: 49year old  Radiation Oncologist: Dr. Elsi Lam  Referring Physician: Dr. King Valerie Ruvalcabar, Dr. Chris Mike, Dr. Carla Geiger (PCP)  Diagnosis: RIGHT BREAST CANCER fatigue. Low energy level related to anxiety   HENT: Negative. Eyes: Negative. Respiratory: Negative. Cardiovascular: Negative. Gastrointestinal: Negative. Endocrine: Negative. Genitourinary: Negative. Musculoskeletal: Negative. [phone] Fax: [address], [address] [phone], [phone]  [place] [address]  Phone: [phone] Fax: [phone]      Past Medical History:   Diagnosis Date   • Anxiety state with alcohol but has cut down over the past year, has blackouts, denies h/o withdrawal seizures and DTs       Drug use: Yes        Frequency: 3.0 times per week        Types: Cannabis        Comment: smokes marijuana a few times per week about 1.5-2g per w activities without restrictions. Education: YES  Knowledge Deficit Plan Of Care:    Problem:  Knowledge Deficit    Problems related to:    Radiation therapy    Interventions:   Instruct on purpose of radiation therapy    Expected Outcomes:  Knowledge o

## 2021-07-16 NOTE — PATIENT INSTRUCTIONS
- WE WILL CALL TO SCHEDULE YOUR CT SIMULATION FOR RADIATION MAPPING       - IF YOU HAVE ANY QUESTIONS OR CONCERNS REGARDING RADIATION THERAPY, PLEASE CALL [phone]

## 2021-07-16 NOTE — PROGRESS NOTES
LMOVMTCB regarding scheduling an appointment with Dr. Leticia Hernandes. Awaiting phone call back from patient.

## 2021-07-16 NOTE — PROGRESS NOTES
Patient called back and scheduled follow up appointment with Dr. Asuncion Mace for Thursday August 12, 2021 at 56 in [place].  Patient thanked me for my help with scheduling and was provided with breast nurse navigator contact information and was encouraged

## 2021-07-16 NOTE — CONSULTS
[address] [facility] [place] Emergency Department      Patient Name: Chelsi Shah   MRN: [id number]  PCP: Giulia Diaz MD  Referring Physician: Elzbieta Dewey MD; Fredy Barreto MD    Diagnosis: right breast invasive ductal carcinoma, pT1b (m surgery as part of routine follow-up for her implant revision surgery.   She notified the physician of a small nodule in the medial aspect of the right breast which she first noticed in April 2021.    6/10/2021: Limited right breast ultrasound showed a 0.8 HYDROcodone-acetaminophen 5-325 MG Oral Tab Take 1-2 tablets by mouth every 4 (four) hours as needed for Pain.  10 tablet 0   • ASPIRIN 81 MG Oral Chew Tab CHEW AND SWALLOW 1 TABLET(81 MG) BY MOUTH DAILY 90 tablet 0   • Calcium Polycarbophil (FIBER-CAPS OR) LAD  CV: RRR  Lungs: CTAB  Ext: wwp, no cyanosis or edema  Neuro: CN II-XII grossly intact    Breast Exam  -Right breast: Normal contours, well-healing incisions, no masses. Right axilla without edema, masses, or LAD.    -Left breast: normal contours, no sk needed for cardiac and pulmonary sparing. We discussed the potential short-term and long-term side effects of radiotherapy. We discussed the risk of implant contracture in the setting of adjuvant radiotherapy being given with an implant in place.     Al

## 2021-07-20 RX ORDER — MOMETASONE FUROATE 1 MG/G
CREAM TOPICAL
Qty: 50 G | Refills: 3 | Status: SHIPPED | OUTPATIENT
Start: 2021-07-20 | End: 2021-11-24 | Stop reason: ALTCHOICE

## 2021-08-01 ENCOUNTER — HOSPITAL ENCOUNTER (OUTPATIENT)
Dept: RADIATION ONCOLOGY | Facility: HOSPITAL | Age: 51
Discharge: HOME | End: 2021-08-01
Attending: INTERNAL MEDICINE
Payer: COMMERCIAL

## 2021-08-09 ENCOUNTER — HOSPITAL ENCOUNTER (OUTPATIENT)
Dept: RADIATION ONCOLOGY | Facility: HOSPITAL | Age: 51
Discharge: HOME | End: 2021-08-09
Attending: INTERNAL MEDICINE
Payer: COMMERCIAL

## 2021-08-09 PROCEDURE — 77470 SPECIAL RADIATION TREATMENT: CPT | Performed by: INTERNAL MEDICINE

## 2021-08-09 PROCEDURE — 77333 RADIATION TREATMENT AID(S): CPT | Performed by: INTERNAL MEDICINE

## 2021-08-09 PROCEDURE — 77290 THER RAD SIMULAJ FIELD CPLX: CPT | Performed by: INTERNAL MEDICINE

## 2021-08-11 ENCOUNTER — OFFICE VISIT (OUTPATIENT)
Dept: SURGERY | Facility: CLINIC | Age: 51
End: 2021-08-11
Payer: COMMERCIAL

## 2021-08-11 DIAGNOSIS — Z90.13 ACQUIRED ABSENCE OF BILATERAL BREASTS AND NIPPLES: Primary | ICD-10-CM

## 2021-08-11 PROCEDURE — 99024 POSTOP FOLLOW-UP VISIT: CPT | Performed by: SURGERY

## 2021-08-11 NOTE — PROGRESS NOTES
THE [facility] Progress Note      Patient Name: Willian Coppola   YOB: 1970  Medical Record Number: [id number]  Consulting Physician: Fabian Arellano M.D.        Date of Visit: 8/12/2021      Chief Complaint  Invasive ductal of 12/2016 due to severe depression that began with the start of tamoxifen. Patient failed to follow up between 07/2017 and 01/2019. On 05/19/2021 she was seen by plastic surgery as follow up to her revision surgery.  She informed Dr. Kristen Stock of a sma 1995; uses alcohol socially. Current Medications   [DISCONTINUED] traZODone HCl 50 MG Oral Tab, Take 1 tablet (50 mg total) by mouth nightly., Disp: 30 tablet, Rfl: 0  lamoTRIgine 100 MG Oral Tab, Take 1 tablet (100 mg total) by mouth daily. , Disp: 3 than 1 mm from the superior.  -Ductal carcinoma in situ, nuclear grade 2, cribriform type. -DCIS is at less than 1 mm from the posterior margin and lateral edge (see also part B). -Prior biopsy site changes.     B.   Right breast, 3:00, lateral margin,

## 2021-08-11 NOTE — PROGRESS NOTES
Brandy Ruggiero is a 49year old female who presents today for a follow-up after excision of breats cancer recurrence by Dr Tiarra Hernandez and wound closure by me      She denies fever and chills. She denies nausea, vomiting, diarrhea or constipation.    Her pain is

## 2021-08-12 ENCOUNTER — OFFICE VISIT (OUTPATIENT)
Dept: HEMATOLOGY/ONCOLOGY | Facility: HOSPITAL | Age: 51
End: 2021-08-12
Attending: SPECIALIST
Payer: COMMERCIAL

## 2021-08-12 ENCOUNTER — TELEPHONE (OUTPATIENT)
Dept: INTERNAL MEDICINE CLINIC | Facility: CLINIC | Age: 51
End: 2021-08-12

## 2021-08-12 VITALS
HEIGHT: 67.76 IN | HEART RATE: 68 BPM | DIASTOLIC BLOOD PRESSURE: 73 MMHG | SYSTOLIC BLOOD PRESSURE: 107 MMHG | WEIGHT: 138 LBS | TEMPERATURE: 98 F | RESPIRATION RATE: 16 BRPM | BODY MASS INDEX: 21.16 KG/M2 | OXYGEN SATURATION: 100 %

## 2021-08-12 DIAGNOSIS — Z12.11 SCREENING FOR MALIGNANT NEOPLASM OF COLON: Primary | ICD-10-CM

## 2021-08-12 DIAGNOSIS — C50.811 MALIGNANT NEOPLASM OF OVERLAPPING SITES OF RIGHT BREAST IN FEMALE, ESTROGEN RECEPTOR POSITIVE (HCC): ICD-10-CM

## 2021-08-12 DIAGNOSIS — Z17.0 MALIGNANT NEOPLASM OF OVERLAPPING SITES OF RIGHT BREAST IN FEMALE, ESTROGEN RECEPTOR POSITIVE (HCC): ICD-10-CM

## 2021-08-12 DIAGNOSIS — F33.41 RECURRENT MAJOR DEPRESSIVE DISORDER, IN PARTIAL REMISSION (HCC): ICD-10-CM

## 2021-08-12 DIAGNOSIS — C50.911 RECURRENT MALIGNANT NEOPLASM OF RIGHT BREAST (HCC): Primary | ICD-10-CM

## 2021-08-12 PROCEDURE — 99215 OFFICE O/P EST HI 40 MIN: CPT | Performed by: SPECIALIST

## 2021-08-12 NOTE — PROGRESS NOTES
Patient is here today for follow up with Bruno Hinds for Breast Cancer. Patient denies pain. Stated reconstruction surgery was on 7/6/2021. Stated is planned to start Radiation next week. Feeling good.   Medication list and medical history were reviewed and

## 2021-08-13 RX ORDER — MOMETASONE FUROATE 1 MG/G
CREAM TOPICAL
Qty: 50 G | Refills: 3 | OUTPATIENT
Start: 2021-08-13

## 2021-08-13 NOTE — TELEPHONE ENCOUNTER
Patient would like a refill for Mometasone Furoate 0.1% external cream from [facility] at [facility]. In [place]

## 2021-08-16 ENCOUNTER — TELEPHONE (OUTPATIENT)
Dept: RADIATION ONCOLOGY | Facility: HOSPITAL | Age: 51
End: 2021-08-16

## 2021-08-16 NOTE — TELEPHONE ENCOUNTER
Patient has a summons for upcoming jury duty but will be starting treatment, she needs a letter on hospital letterhead or prescription pad excusing her from jury duty

## 2021-08-17 ENCOUNTER — OFFICE VISIT (OUTPATIENT)
Dept: SURGERY | Facility: CLINIC | Age: 51
End: 2021-08-17
Payer: COMMERCIAL

## 2021-08-17 VITALS
WEIGHT: 139.38 LBS | RESPIRATION RATE: 16 BRPM | TEMPERATURE: 98 F | SYSTOLIC BLOOD PRESSURE: 114 MMHG | HEART RATE: 66 BPM | HEIGHT: 67.76 IN | DIASTOLIC BLOOD PRESSURE: 74 MMHG | OXYGEN SATURATION: 98 % | BODY MASS INDEX: 21.37 KG/M2

## 2021-08-17 DIAGNOSIS — C50.811 MALIGNANT NEOPLASM OF OVERLAPPING SITES OF RIGHT BREAST IN FEMALE, ESTROGEN RECEPTOR POSITIVE (HCC): Primary | ICD-10-CM

## 2021-08-17 DIAGNOSIS — Z17.0 MALIGNANT NEOPLASM OF OVERLAPPING SITES OF RIGHT BREAST IN FEMALE, ESTROGEN RECEPTOR POSITIVE (HCC): Primary | ICD-10-CM

## 2021-08-17 PROCEDURE — 99024 POSTOP FOLLOW-UP VISIT: CPT | Performed by: SURGERY

## 2021-08-17 PROCEDURE — 3008F BODY MASS INDEX DOCD: CPT | Performed by: SURGERY

## 2021-08-17 PROCEDURE — 3074F SYST BP LT 130 MM HG: CPT | Performed by: SURGERY

## 2021-08-17 PROCEDURE — 3078F DIAST BP <80 MM HG: CPT | Performed by: SURGERY

## 2021-08-18 PROCEDURE — 77334 RADIATION TREATMENT AID(S): CPT | Performed by: INTERNAL MEDICINE

## 2021-08-18 PROCEDURE — 77300 RADIATION THERAPY DOSE PLAN: CPT | Performed by: INTERNAL MEDICINE

## 2021-08-18 PROCEDURE — 77293 RESPIRATOR MOTION MGMT SIMUL: CPT | Performed by: INTERNAL MEDICINE

## 2021-08-18 PROCEDURE — 77295 3-D RADIOTHERAPY PLAN: CPT | Performed by: INTERNAL MEDICINE

## 2021-08-25 ENCOUNTER — HOSPITAL ENCOUNTER (OUTPATIENT)
Dept: RADIATION ONCOLOGY | Facility: HOSPITAL | Age: 51
Discharge: HOME | End: 2021-08-25
Attending: INTERNAL MEDICINE
Payer: COMMERCIAL

## 2021-08-25 PROCEDURE — 77412 RADIATION TX DELIVERY LVL 3: CPT | Performed by: INTERNAL MEDICINE

## 2021-08-25 PROCEDURE — 77334 RADIATION TREATMENT AID(S): CPT | Performed by: INTERNAL MEDICINE

## 2021-08-25 PROCEDURE — 77280 THER RAD SIMULAJ FIELD SMPL: CPT | Performed by: INTERNAL MEDICINE

## 2021-08-26 ENCOUNTER — HOSPITAL ENCOUNTER (OUTPATIENT)
Age: 51
Discharge: HOME | End: 2021-08-26
Payer: COMMERCIAL

## 2021-08-26 VITALS
OXYGEN SATURATION: 99 % | HEIGHT: 68 IN | BODY MASS INDEX: 20.92 KG/M2 | WEIGHT: 138 LBS | HEART RATE: 76 BPM | RESPIRATION RATE: 16 BRPM | DIASTOLIC BLOOD PRESSURE: 86 MMHG | SYSTOLIC BLOOD PRESSURE: 124 MMHG | TEMPERATURE: 97 F

## 2021-08-26 DIAGNOSIS — L03.113 CELLULITIS OF RIGHT UPPER EXTREMITY: Primary | ICD-10-CM

## 2021-08-26 PROCEDURE — 77387 GUIDANCE FOR RADJ TX DLVR: CPT | Performed by: INTERNAL MEDICINE

## 2021-08-26 PROCEDURE — 99213 OFFICE O/P EST LOW 20 MIN: CPT | Performed by: NURSE PRACTITIONER

## 2021-08-26 PROCEDURE — 77412 RADIATION TX DELIVERY LVL 3: CPT | Performed by: INTERNAL MEDICINE

## 2021-08-26 RX ORDER — CEPHALEXIN 500 MG/1
500 CAPSULE ORAL 4 TIMES DAILY
Qty: 28 CAPSULE | Refills: 0 | Status: SHIPPED | OUTPATIENT
Start: 2021-08-26 | End: 2021-08-31 | Stop reason: ALTCHOICE

## 2021-08-27 ENCOUNTER — HOSPITAL ENCOUNTER (OUTPATIENT)
Dept: RADIATION ONCOLOGY | Facility: HOSPITAL | Age: 51
Discharge: HOME | End: 2021-08-27
Attending: INTERNAL MEDICINE
Payer: COMMERCIAL

## 2021-08-27 VITALS
TEMPERATURE: 98 F | HEART RATE: 69 BPM | OXYGEN SATURATION: 99 % | RESPIRATION RATE: 17 BRPM | DIASTOLIC BLOOD PRESSURE: 62 MMHG | SYSTOLIC BLOOD PRESSURE: 96 MMHG

## 2021-08-27 DIAGNOSIS — Z17.0 MALIGNANT NEOPLASM OF OVERLAPPING SITES OF RIGHT BREAST IN FEMALE, ESTROGEN RECEPTOR POSITIVE (HCC): Primary | ICD-10-CM

## 2021-08-27 DIAGNOSIS — I77.71 CAROTID ARTERY DISSECTION (HCC): ICD-10-CM

## 2021-08-27 DIAGNOSIS — C50.811 MALIGNANT NEOPLASM OF OVERLAPPING SITES OF RIGHT BREAST IN FEMALE, ESTROGEN RECEPTOR POSITIVE (HCC): Primary | ICD-10-CM

## 2021-08-27 PROCEDURE — 77412 RADIATION TX DELIVERY LVL 3: CPT | Performed by: INTERNAL MEDICINE

## 2021-08-27 PROCEDURE — 77331 SPECIAL RADIATION DOSIMETRY: CPT | Performed by: INTERNAL MEDICINE

## 2021-08-27 PROCEDURE — 77387 GUIDANCE FOR RADJ TX DLVR: CPT | Performed by: INTERNAL MEDICINE

## 2021-08-27 RX ORDER — ASPIRIN 81 MG/1
TABLET, CHEWABLE ORAL
Qty: 90 TABLET | Refills: 0 | Status: SHIPPED | OUTPATIENT
Start: 2021-08-27 | End: 2021-12-28

## 2021-08-27 NOTE — ED PROVIDER NOTES
Patient Seen in: Immediate [address] [place] [address]      History   Patient presents with:  Rash Skin Problem    Stated Complaint: rash x 7 days    HPI/Subjective: This is a 56-year-old female with below stated medical history.   Presents to immediate care comment: social smoker in college for a few years    Vaping Use      Vaping Use: Never used    Alcohol use:  Yes      Alcohol/week: 1.0 - 4.0 standard drinks      Types: 1 - 4 Standard drinks or equivalent per week      Comment: 1-4 drinks per week; has a h Constitutional:       General: She is not in acute distress. Appearance: Normal appearance. She is normal weight. She is not ill-appearing, toxic-appearing or diaphoretic. HENT:      Head: Normocephalic and atraumatic.       Right Ear: External ear no understanding, and agreed with plan of care. All questions answered.                              Disposition and Plan     Clinical Impression:  Cellulitis of right upper extremity  (primary encounter diagnosis)     Disposition:  Discharge  8/26/2021  7:55

## 2021-08-27 NOTE — ED INITIAL ASSESSMENT (HPI)
x1 wk ago Pt thought she had a spider bite on her right inner forearm. +itch with slight irritation. 8/26 AM Pt noted to red spots on her abd.

## 2021-08-27 NOTE — TELEPHONE ENCOUNTER
Medication: ASPIRIN 81 MG Oral Chew Tab    Date of last refill: 03/19/2021 (#90/0)  [place]    Last office visit: 02/17/2021  Due back to clinic per last office note:  6 months  Date next office visit scheduled:    F cervical dissections in setting of possible fibromuscular dysplasia.  We also advised limiting use of marijuana as it has been linked with RCVS    30 total minutes spent with patient >50% of visit was spent in counseling and coordination of care     Follow

## 2021-08-29 ENCOUNTER — PATIENT MESSAGE (OUTPATIENT)
Dept: RADIATION ONCOLOGY | Facility: HOSPITAL | Age: 51
End: 2021-08-29

## 2021-08-30 ENCOUNTER — TELEPHONE (OUTPATIENT)
Dept: RADIATION ONCOLOGY | Facility: HOSPITAL | Age: 51
End: 2021-08-30

## 2021-08-30 NOTE — TELEPHONE ENCOUNTER
I spoke with patient regarding her rash. She started keflex less than 72 hours ago so I have asked her to re-assess spread of redness for another 24 hours before making any changes. She will outline the area of spread and send me a picture in 24 hours.

## 2021-08-30 NOTE — TELEPHONE ENCOUNTER
Regarding: Other  Contact: [phone]  ----- Message from Pallavi Hollins RN sent at 8/30/2021  9:33 AM CDT -----       ----- Message from Jerome Jolley to Aime Bingham MD sent at 8/29/2021 11:59 AM -----   I thought I would send a couple photos.  It star

## 2021-08-30 NOTE — PROGRESS NOTES
Breast Surgery Post-Operative Visit    Diagnosis: Right recurrent breast cancer status post lumpectomy on July 6, 2021.     Stage: Cancer Staging  Malignant neoplasm of overlapping sites of right female breast [facility])  Staging form: Breast, [address]  pain    • Visual impairment     glasses       Past Surgical History:   Procedure Laterality Date   • HERNIA SURGERY      umbilical hernia surgery again a few months ago   • IMPLANT LEFT      2016 removal of expanders and implants placed   • IMPLANT RIGHT Multiple Vitamins-Minerals (BIOTIN PLUS/CALCIUM/VIT D3) Oral Tab, Take by mouth., Disp: , Rfl:   Multiple Vitamin (DAILY AVIS) Oral Tab, Take 1 tablet by mouth daily.   , Disp: , Rfl:         Allergies:    No Known Allergies    Family History:   Family Hi pressure/discomfort, palpitations, irregular heartbeat, fainting or near-fainting, difficulty breathing when lying flat, SOB/Coughing at night, swelling of the legs or chest pain while walking.     Breasts:  See history of present illness    Riddhi 6.4 oz)   LMP 08/17/2021   SpO2 98%   BMI 21.35 kg/m²     Physical Exam:  The patient is an alert, oriented, well-nourished and  well-developed woman who appears her stated age. Her speech patterns and movements are normal. Her affect is appropriate.     HE pathology which demonstrated a 1.1 cm tumor with negative margins for which no further surgery will be needed.  She will meet with radiation oncology to discuss optimization of regional control and will meet again with medical oncology to discuss the role o

## 2021-08-31 ENCOUNTER — OFFICE VISIT (OUTPATIENT)
Dept: INTERNAL MEDICINE CLINIC | Facility: CLINIC | Age: 51
End: 2021-08-31
Payer: COMMERCIAL

## 2021-08-31 ENCOUNTER — DOCUMENTATION ONLY (OUTPATIENT)
Dept: RADIATION ONCOLOGY | Facility: HOSPITAL | Age: 51
End: 2021-08-31

## 2021-08-31 VITALS
RESPIRATION RATE: 12 BRPM | TEMPERATURE: 99 F | OXYGEN SATURATION: 99 % | HEART RATE: 73 BPM | BODY MASS INDEX: 21.41 KG/M2 | WEIGHT: 139.63 LBS | HEIGHT: 67.75 IN | SYSTOLIC BLOOD PRESSURE: 104 MMHG | DIASTOLIC BLOOD PRESSURE: 68 MMHG

## 2021-08-31 DIAGNOSIS — L03.90 CELLULITIS, UNSPECIFIED CELLULITIS SITE: Primary | ICD-10-CM

## 2021-08-31 DIAGNOSIS — L28.2 PRURITIC RASH: ICD-10-CM

## 2021-08-31 PROCEDURE — 3074F SYST BP LT 130 MM HG: CPT | Performed by: INTERNAL MEDICINE

## 2021-08-31 PROCEDURE — 77387 GUIDANCE FOR RADJ TX DLVR: CPT | Performed by: INTERNAL MEDICINE

## 2021-08-31 PROCEDURE — 3078F DIAST BP <80 MM HG: CPT | Performed by: INTERNAL MEDICINE

## 2021-08-31 PROCEDURE — 77412 RADIATION TX DELIVERY LVL 3: CPT | Performed by: INTERNAL MEDICINE

## 2021-08-31 PROCEDURE — 99214 OFFICE O/P EST MOD 30 MIN: CPT | Performed by: INTERNAL MEDICINE

## 2021-08-31 PROCEDURE — 3008F BODY MASS INDEX DOCD: CPT | Performed by: INTERNAL MEDICINE

## 2021-08-31 RX ORDER — HYDROXYZINE HYDROCHLORIDE 10 MG/1
10 TABLET, FILM COATED ORAL EVERY 8 HOURS PRN
Qty: 30 TABLET | Refills: 0 | Status: SHIPPED | OUTPATIENT
Start: 2021-08-31 | End: 2021-12-21

## 2021-08-31 RX ORDER — AMOXICILLIN AND CLAVULANATE POTASSIUM 875; 125 MG/1; MG/1
1 TABLET, FILM COATED ORAL 2 TIMES DAILY
Qty: 20 TABLET | Refills: 0 | Status: SHIPPED | OUTPATIENT
Start: 2021-08-31 | End: 2021-09-03

## 2021-08-31 NOTE — PROGRESS NOTES
HPI/Subjective:   Imer Dobbs is a 49year old female with past medical history as below who presents for Bite Sting,Insect     Went to urgent care on 8/26/21 for a rash that had been present for already 1 week at that time.  She was given Keflex  for ce Calcium Polycarbophil (FIBER-CAPS OR)      • ibuprofen 200 MG Oral Tab Take 400 mg by mouth every 6 (six) hours as needed for Pain. • Multiple Vitamins-Minerals (BIOTIN PLUS/CALCIUM/VIT D3) Oral Tab Take by mouth.      • Multiple Vitamin (DAILY AVIS) caution since may cause drowsiness.                Dequan Wadsworth MD, 8/31/2021, 2:36 PM

## 2021-08-31 NOTE — PATIENT INSTRUCTIONS
Stop keflex  Start augmentin. If there is no improvement within 2 doses or if it worsens or new symptoms like fever then go to ER.

## 2021-09-01 ENCOUNTER — HOSPITAL ENCOUNTER (EMERGENCY)
Facility: HOSPITAL | Age: 51
Discharge: HOME | End: 2021-09-01
Attending: EMERGENCY MEDICINE
Payer: COMMERCIAL

## 2021-09-01 ENCOUNTER — PATIENT MESSAGE (OUTPATIENT)
Dept: INTERNAL MEDICINE CLINIC | Facility: CLINIC | Age: 51
End: 2021-09-01

## 2021-09-01 ENCOUNTER — HOSPITAL ENCOUNTER (OUTPATIENT)
Dept: RADIATION ONCOLOGY | Facility: HOSPITAL | Age: 51
Discharge: HOME | End: 2021-09-01
Attending: INTERNAL MEDICINE
Payer: COMMERCIAL

## 2021-09-01 VITALS
WEIGHT: 139 LBS | OXYGEN SATURATION: 97 % | BODY MASS INDEX: 21.07 KG/M2 | HEIGHT: 68 IN | SYSTOLIC BLOOD PRESSURE: 109 MMHG | RESPIRATION RATE: 18 BRPM | HEART RATE: 79 BPM | TEMPERATURE: 99 F | DIASTOLIC BLOOD PRESSURE: 69 MMHG

## 2021-09-01 DIAGNOSIS — R21 RASH: ICD-10-CM

## 2021-09-01 DIAGNOSIS — L03.113 CELLULITIS OF RIGHT UPPER EXTREMITY: Primary | ICD-10-CM

## 2021-09-01 LAB
ALBUMIN SERPL-MCNC: 2.9 G/DL (ref 3.4–5)
ALBUMIN/GLOB SERPL: 1.3 {RATIO} (ref 1–2)
ALP LIVER SERPL-CCNC: 22 U/L
ALT SERPL-CCNC: 14 U/L
ANION GAP SERPL CALC-SCNC: 8 MMOL/L (ref 0–18)
AST SERPL-CCNC: 13 U/L (ref 15–37)
BASOPHILS # BLD AUTO: 0.04 X10(3) UL (ref 0–0.2)
BASOPHILS NFR BLD AUTO: 0.9 %
BILIRUB SERPL-MCNC: 0.8 MG/DL (ref 0.1–2)
BUN BLD-MCNC: 7 MG/DL (ref 7–18)
CALCIUM BLD-MCNC: 6.8 MG/DL (ref 8.5–10.1)
CHLORIDE SERPL-SCNC: 114 MMOL/L (ref 98–112)
CO2 SERPL-SCNC: 21 MMOL/L (ref 21–32)
CREAT BLD-MCNC: 0.46 MG/DL
CRP SERPL-MCNC: <0.29 MG/DL (ref ?–0.3)
EOSINOPHIL # BLD AUTO: 0.27 X10(3) UL (ref 0–0.7)
EOSINOPHIL NFR BLD AUTO: 6 %
ERYTHROCYTE [DISTWIDTH] IN BLOOD BY AUTOMATED COUNT: 13.7 %
GLOBULIN PLAS-MCNC: 2.3 G/DL (ref 2.8–4.4)
GLUCOSE BLD-MCNC: 92 MG/DL (ref 70–99)
HCT VFR BLD AUTO: 34.5 %
HGB BLD-MCNC: 12.1 G/DL
IMM GRANULOCYTES # BLD AUTO: 0.01 X10(3) UL (ref 0–1)
IMM GRANULOCYTES NFR BLD: 0.2 %
LYMPHOCYTES # BLD AUTO: 1.17 X10(3) UL (ref 1–4)
LYMPHOCYTES NFR BLD AUTO: 26.2 %
M PROTEIN MFR SERPL ELPH: 5.2 G/DL (ref 6.4–8.2)
MCH RBC QN AUTO: 32.8 PG (ref 26–34)
MCHC RBC AUTO-ENTMCNC: 35.1 G/DL (ref 31–37)
MCV RBC AUTO: 93.5 FL
MONOCYTES # BLD AUTO: 0.38 X10(3) UL (ref 0.1–1)
MONOCYTES NFR BLD AUTO: 8.5 %
NEUTROPHILS # BLD AUTO: 2.6 X10 (3) UL (ref 1.5–7.7)
NEUTROPHILS # BLD AUTO: 2.6 X10(3) UL (ref 1.5–7.7)
NEUTROPHILS NFR BLD AUTO: 58.2 %
OSMOLALITY SERPL CALC.SUM OF ELEC: 294 MOSM/KG (ref 275–295)
PLATELET # BLD AUTO: 223 10(3)UL (ref 150–450)
POTASSIUM SERPL-SCNC: 3.2 MMOL/L (ref 3.5–5.1)
PROCALCITONIN SERPL-MCNC: <0.05 NG/ML (ref ?–0.16)
RBC # BLD AUTO: 3.69 X10(6)UL
SODIUM SERPL-SCNC: 143 MMOL/L (ref 136–145)
WBC # BLD AUTO: 4.5 X10(3) UL (ref 4–11)

## 2021-09-01 PROCEDURE — 77387 GUIDANCE FOR RADJ TX DLVR: CPT | Performed by: INTERNAL MEDICINE

## 2021-09-01 PROCEDURE — S0077 INJECTION, CLINDAMYCIN PHOSP: HCPCS | Performed by: EMERGENCY MEDICINE

## 2021-09-01 PROCEDURE — 86140 C-REACTIVE PROTEIN: CPT | Performed by: EMERGENCY MEDICINE

## 2021-09-01 PROCEDURE — 99284 EMERGENCY DEPT VISIT MOD MDM: CPT

## 2021-09-01 PROCEDURE — 80053 COMPREHEN METABOLIC PANEL: CPT | Performed by: EMERGENCY MEDICINE

## 2021-09-01 PROCEDURE — 85025 COMPLETE CBC W/AUTO DIFF WBC: CPT | Performed by: EMERGENCY MEDICINE

## 2021-09-01 PROCEDURE — 84145 PROCALCITONIN (PCT): CPT | Performed by: EMERGENCY MEDICINE

## 2021-09-01 PROCEDURE — 77412 RADIATION TX DELIVERY LVL 3: CPT | Performed by: INTERNAL MEDICINE

## 2021-09-01 PROCEDURE — 96365 THER/PROPH/DIAG IV INF INIT: CPT

## 2021-09-01 RX ORDER — FAMCICLOVIR 500 MG/1
500 TABLET, FILM COATED ORAL 3 TIMES DAILY
Qty: 21 TABLET | Refills: 0 | Status: SHIPPED | OUTPATIENT
Start: 2021-09-01 | End: 2021-10-08 | Stop reason: ALTCHOICE

## 2021-09-01 RX ORDER — CLINDAMYCIN HYDROCHLORIDE 300 MG/1
300 CAPSULE ORAL 3 TIMES DAILY
Qty: 30 CAPSULE | Refills: 0 | Status: SHIPPED | OUTPATIENT
Start: 2021-09-01 | End: 2021-10-08 | Stop reason: ALTCHOICE

## 2021-09-01 RX ORDER — CLINDAMYCIN PHOSPHATE 600 MG/50ML
600 INJECTION INTRAVENOUS ONCE
Status: COMPLETED | OUTPATIENT
Start: 2021-09-01 | End: 2021-09-01

## 2021-09-01 NOTE — PROGRESS NOTES
Patient has gardening last week and sustained bug bite on R forearm on Thursday. Over weekend start developing redness and swelling over forearm. Was started on oral abx by urgent care.   After developed small patches of red macular papular rash near R ni

## 2021-09-01 NOTE — TELEPHONE ENCOUNTER
From: Tone Pearson  To: Marlyn Regan MD  Sent: 9/1/2021 12:09 PM CDT  Subject: Other    Dr. Brenda Leslie,   I saw your colleague yesterday who told me to delia the perimeter last night. I took two doses of the new antibiotic.  She told me that if it doesn't get be

## 2021-09-01 NOTE — TELEPHONE ENCOUNTER
I called Ms. Mahoney Ernestina to follow-up. The presentation of the cellulitis appears to have enlarged slightly from prior even after switching antibiotics.  She denies fevers/drainage or worsening swelling, but the cellulitis does not appear to be responding appro

## 2021-09-02 ENCOUNTER — TELEPHONE (OUTPATIENT)
Dept: INTERNAL MEDICINE CLINIC | Facility: CLINIC | Age: 51
End: 2021-09-02

## 2021-09-02 PROCEDURE — 77412 RADIATION TX DELIVERY LVL 3: CPT | Performed by: INTERNAL MEDICINE

## 2021-09-02 PROCEDURE — 77387 GUIDANCE FOR RADJ TX DLVR: CPT | Performed by: INTERNAL MEDICINE

## 2021-09-02 NOTE — ED PROVIDER NOTES
Patient Seen in: [facility] Emergency Department      History   Patient presents with:  Cellulitis    Stated Complaint: IV abx for cellulitis to arm-    HPI/Subjective:   HPI    60-year-old female presents to the emergency department for evaluation comment: social smoker in college for a few years    Vaping Use      Vaping Use: Never used    Alcohol use:  Yes      Alcohol/week: 5.0 standard drinks      Types: 5 Standard drinks or equivalent per week      Comment: history of self medicating with alcoho Potassium 3.2 (*)     Chloride 114 (*)     Creatinine 0.46 (*)     Calcium, Total 6.8 (*)     AST 13 (*)     Alkaline Phosphatase 22 (*)     Total Protein 5.2 (*)     Albumin 2.9 (*)     Globulin  2.3 (*)     All other components within normal limits   CB Follow-up:  Trisha Ramirez MD  [address] [address] [address]  [address]  [phone]    Call in 2 days            Medications Prescribed:  Discharge Medication List as of 9/1/2021  7:22 PM    START taking these medications    clindamycin 300

## 2021-09-03 ENCOUNTER — OFFICE VISIT (OUTPATIENT)
Dept: INTERNAL MEDICINE CLINIC | Facility: CLINIC | Age: 51
End: 2021-09-03
Payer: COMMERCIAL

## 2021-09-03 ENCOUNTER — HOSPITAL ENCOUNTER (OUTPATIENT)
Dept: RADIATION ONCOLOGY | Facility: HOSPITAL | Age: 51
Discharge: HOME | End: 2021-09-03
Attending: INTERNAL MEDICINE
Payer: COMMERCIAL

## 2021-09-03 ENCOUNTER — TELEPHONE (OUTPATIENT)
Dept: RADIATION ONCOLOGY | Facility: HOSPITAL | Age: 51
End: 2021-09-03

## 2021-09-03 ENCOUNTER — LAB ENCOUNTER (OUTPATIENT)
Dept: LAB | Age: 51
End: 2021-09-03
Attending: INTERNAL MEDICINE
Payer: COMMERCIAL

## 2021-09-03 VITALS
HEIGHT: 67.75 IN | WEIGHT: 140 LBS | DIASTOLIC BLOOD PRESSURE: 72 MMHG | OXYGEN SATURATION: 100 % | HEART RATE: 73 BPM | BODY MASS INDEX: 21.46 KG/M2 | SYSTOLIC BLOOD PRESSURE: 102 MMHG | RESPIRATION RATE: 12 BRPM | TEMPERATURE: 98 F

## 2021-09-03 DIAGNOSIS — L03.90 CELLULITIS, UNSPECIFIED CELLULITIS SITE: Primary | ICD-10-CM

## 2021-09-03 DIAGNOSIS — C50.811 MALIGNANT NEOPLASM OF OVERLAPPING SITES OF RIGHT BREAST IN FEMALE, ESTROGEN RECEPTOR POSITIVE (HCC): Primary | ICD-10-CM

## 2021-09-03 DIAGNOSIS — R21 RASH AND NONSPECIFIC SKIN ERUPTION: ICD-10-CM

## 2021-09-03 DIAGNOSIS — L03.90 CELLULITIS, UNSPECIFIED CELLULITIS SITE: ICD-10-CM

## 2021-09-03 DIAGNOSIS — Z17.0 MALIGNANT NEOPLASM OF OVERLAPPING SITES OF RIGHT BREAST IN FEMALE, ESTROGEN RECEPTOR POSITIVE (HCC): Primary | ICD-10-CM

## 2021-09-03 DIAGNOSIS — B02.9 HERPES ZOSTER WITHOUT COMPLICATION: ICD-10-CM

## 2021-09-03 PROCEDURE — 77336 RADIATION PHYSICS CONSULT: CPT | Performed by: INTERNAL MEDICINE

## 2021-09-03 PROCEDURE — 3074F SYST BP LT 130 MM HG: CPT | Performed by: INTERNAL MEDICINE

## 2021-09-03 PROCEDURE — 86618 LYME DISEASE ANTIBODY: CPT | Performed by: INTERNAL MEDICINE

## 2021-09-03 PROCEDURE — 99214 OFFICE O/P EST MOD 30 MIN: CPT | Performed by: INTERNAL MEDICINE

## 2021-09-03 PROCEDURE — 3008F BODY MASS INDEX DOCD: CPT | Performed by: INTERNAL MEDICINE

## 2021-09-03 PROCEDURE — 77387 GUIDANCE FOR RADJ TX DLVR: CPT | Performed by: INTERNAL MEDICINE

## 2021-09-03 PROCEDURE — 77412 RADIATION TX DELIVERY LVL 3: CPT | Performed by: INTERNAL MEDICINE

## 2021-09-03 PROCEDURE — 3078F DIAST BP <80 MM HG: CPT | Performed by: INTERNAL MEDICINE

## 2021-09-03 NOTE — PROGRESS NOTES
[facility] Radiation Treatment Management Note 6-10    Patient:  Kvng Lima  Age:  49year old  Visit Diagnosis:  Recurrent R breast cancer, ER+  Primary Rad/Onc:  Dr. Maria Teresa Vallecillo    Site Delivered Dose (cGy) Prescribed Dose (cGy to next session given ongoing derm issue.  -Cont mometasone and moisturizer bid. We discussed expected future toxicity. All questions answered.   Next OTV 1 week    Randy Hernandez MD

## 2021-09-03 NOTE — TELEPHONE ENCOUNTER
TC placed to pt regarding appt time today. Pt able to come at 330 pm to be seen prior to treatment. States she is seeing PCP and Dermatologist for skin concern today, as well.

## 2021-09-03 NOTE — PROGRESS NOTES
HPI/Subjective:   Merrick العلي is a 49year old female with past medical history as below who presents for ER F/U (9/1/2021 d/t cellulitis)     Went to urgent care on 8/26/21 for a rash that had been present for already 1 week at that time.  She was given mg total) by mouth daily. 30 tablet 0   • clonazePAM (KLONOPIN) 0.5 MG Oral Tab Take 1 tablet (0.5 mg total) by mouth 2 (two) times daily as needed for Anxiety.  60 tablet 0   • ASPIRIN 81 MG Oral Chew Tab CHEW AND SWALLOW 1 TABLET(81 MG) BY MOUTH DAILY 90 DISEASE - EXTERNAL- due to skin infections with delayed healing  -rule out lyme disease- lesion most likely not associated with lyme disease but will test for it- LYME DISEASE,         TOTAL AB W RFLX                (R21) Rash and nonspecific skin eruption

## 2021-09-07 LAB — B BURGDOR IGG+IGM SER QL: NEGATIVE

## 2021-09-07 PROCEDURE — 77387 GUIDANCE FOR RADJ TX DLVR: CPT | Performed by: INTERNAL MEDICINE

## 2021-09-07 PROCEDURE — 77412 RADIATION TX DELIVERY LVL 3: CPT | Performed by: INTERNAL MEDICINE

## 2021-09-08 PROCEDURE — 77387 GUIDANCE FOR RADJ TX DLVR: CPT | Performed by: INTERNAL MEDICINE

## 2021-09-08 PROCEDURE — 77412 RADIATION TX DELIVERY LVL 3: CPT | Performed by: INTERNAL MEDICINE

## 2021-09-09 PROCEDURE — 77412 RADIATION TX DELIVERY LVL 3: CPT | Performed by: INTERNAL MEDICINE

## 2021-09-09 PROCEDURE — 77387 GUIDANCE FOR RADJ TX DLVR: CPT | Performed by: INTERNAL MEDICINE

## 2021-09-10 ENCOUNTER — HOSPITAL ENCOUNTER (OUTPATIENT)
Dept: RADIATION ONCOLOGY | Facility: HOSPITAL | Age: 51
Discharge: HOME | End: 2021-09-10
Attending: INTERNAL MEDICINE
Payer: COMMERCIAL

## 2021-09-10 VITALS
DIASTOLIC BLOOD PRESSURE: 6 MMHG | SYSTOLIC BLOOD PRESSURE: 103 MMHG | OXYGEN SATURATION: 99 % | RESPIRATION RATE: 18 BRPM | HEART RATE: 99 BPM | TEMPERATURE: 98 F

## 2021-09-10 DIAGNOSIS — C50.811 MALIGNANT NEOPLASM OF OVERLAPPING SITES OF RIGHT BREAST IN FEMALE, ESTROGEN RECEPTOR POSITIVE (HCC): Primary | ICD-10-CM

## 2021-09-10 DIAGNOSIS — Z17.0 MALIGNANT NEOPLASM OF OVERLAPPING SITES OF RIGHT BREAST IN FEMALE, ESTROGEN RECEPTOR POSITIVE (HCC): Primary | ICD-10-CM

## 2021-09-10 PROCEDURE — 77387 GUIDANCE FOR RADJ TX DLVR: CPT | Performed by: INTERNAL MEDICINE

## 2021-09-10 PROCEDURE — 77412 RADIATION TX DELIVERY LVL 3: CPT | Performed by: INTERNAL MEDICINE

## 2021-09-10 PROCEDURE — 77336 RADIATION PHYSICS CONSULT: CPT | Performed by: INTERNAL MEDICINE

## 2021-09-10 NOTE — PROGRESS NOTES
[facility] Radiation Treatment Management Note 11-15    Patient:  Faith Roland  Age:  49year old  Visit Diagnosis:  Recurrent R breast cancer, ER+  Primary Rad/Onc:  Dr. Vania Wheeler    Site Delivered Dose (cGy) Prescribed Dose (cG

## 2021-09-13 ENCOUNTER — TELEPHONE (OUTPATIENT)
Dept: INTERNAL MEDICINE CLINIC | Facility: CLINIC | Age: 51
End: 2021-09-13

## 2021-09-13 ENCOUNTER — TELEPHONE (OUTPATIENT)
Dept: RADIATION ONCOLOGY | Facility: HOSPITAL | Age: 51
End: 2021-09-13

## 2021-09-13 ENCOUNTER — OFFICE VISIT (OUTPATIENT)
Dept: INTERNAL MEDICINE CLINIC | Facility: CLINIC | Age: 51
End: 2021-09-13
Payer: COMMERCIAL

## 2021-09-13 VITALS
DIASTOLIC BLOOD PRESSURE: 64 MMHG | SYSTOLIC BLOOD PRESSURE: 88 MMHG | HEIGHT: 67.75 IN | RESPIRATION RATE: 16 BRPM | BODY MASS INDEX: 21.1 KG/M2 | HEART RATE: 68 BPM | TEMPERATURE: 98 F | WEIGHT: 137.63 LBS | OXYGEN SATURATION: 99 %

## 2021-09-13 DIAGNOSIS — Z00.00 ROUTINE GENERAL MEDICAL EXAMINATION AT A HEALTH CARE FACILITY: Primary | ICD-10-CM

## 2021-09-13 PROCEDURE — 77387 GUIDANCE FOR RADJ TX DLVR: CPT | Performed by: INTERNAL MEDICINE

## 2021-09-13 PROCEDURE — 3078F DIAST BP <80 MM HG: CPT | Performed by: INTERNAL MEDICINE

## 2021-09-13 PROCEDURE — 3008F BODY MASS INDEX DOCD: CPT | Performed by: INTERNAL MEDICINE

## 2021-09-13 PROCEDURE — 3074F SYST BP LT 130 MM HG: CPT | Performed by: INTERNAL MEDICINE

## 2021-09-13 PROCEDURE — 99396 PREV VISIT EST AGE 40-64: CPT | Performed by: INTERNAL MEDICINE

## 2021-09-13 PROCEDURE — 77412 RADIATION TX DELIVERY LVL 3: CPT | Performed by: INTERNAL MEDICINE

## 2021-09-13 RX ORDER — CYANOCOBALAMIN (VITAMIN B-12) 1000 MCG
1 TABLET, EXTENDED RELEASE ORAL DAILY
COMMUNITY
End: 2021-12-21

## 2021-09-13 NOTE — TELEPHONE ENCOUNTER
Patient had a exposure to COVID on Saturday 9/11, she would like to discuss how this will affect her treatments

## 2021-09-13 NOTE — PROGRESS NOTES
Corey Conner is a 49year old female. HPI:   Patient presents for CPX. 1. Rash - dermatologist felt it was due to poison ivy. However, she did complete course of antibiotic and antiviral treatment.    2. At vaginal opening she has noticed a skin tag • Back problem     lower back problem   • Breast cancer [facility])     right breast- diagnosed 5/2016, bilateral mastectomy   • Depression    • Low back pain    • Visual impairment     glasses      Past Surgical History:   Procedure Laterality Date   • HERNIA 08/17/2021   SpO2 99%   BMI 21.08 kg/m²   GENERAL: A&O well developed, well nourished,in no apparent distress  SKIN: no rash on RUE. Rashes over right hip and breast are fading.    HEENT: atraumatic,   NECK: supple, no jvd, no thyromegaly  LUNGS: clear to a calcium/vit D3, weight bearing exercises  Vaccines: TDAP 2018, educated on yearly flu shot. Advised on shingrix series.    Medical POA:  is primary  Hepatitis C Screen: Ab ordered    Care Team:  Oncology - Shawn Atkinson  Psychiatry/Medication managem

## 2021-09-13 NOTE — PATIENT INSTRUCTIONS
Please complete your fasting labs today. Please schedule a nurse visit for your shingrix series. I also advise you get the shingrix vaccine once in a lifetime. This is NEW and considered better than the previous shingles vaccine named, zostavax.  It

## 2021-09-14 PROCEDURE — 77412 RADIATION TX DELIVERY LVL 3: CPT | Performed by: INTERNAL MEDICINE

## 2021-09-14 PROCEDURE — 77387 GUIDANCE FOR RADJ TX DLVR: CPT | Performed by: INTERNAL MEDICINE

## 2021-09-15 PROCEDURE — 77387 GUIDANCE FOR RADJ TX DLVR: CPT | Performed by: INTERNAL MEDICINE

## 2021-09-15 PROCEDURE — 77412 RADIATION TX DELIVERY LVL 3: CPT | Performed by: INTERNAL MEDICINE

## 2021-09-16 PROCEDURE — 77387 GUIDANCE FOR RADJ TX DLVR: CPT | Performed by: INTERNAL MEDICINE

## 2021-09-16 PROCEDURE — 77412 RADIATION TX DELIVERY LVL 3: CPT | Performed by: INTERNAL MEDICINE

## 2021-09-17 ENCOUNTER — HOSPITAL ENCOUNTER (OUTPATIENT)
Dept: RADIATION ONCOLOGY | Facility: HOSPITAL | Age: 51
Discharge: HOME | End: 2021-09-17
Attending: INTERNAL MEDICINE
Payer: COMMERCIAL

## 2021-09-17 VITALS
HEART RATE: 76 BPM | RESPIRATION RATE: 19 BRPM | SYSTOLIC BLOOD PRESSURE: 118 MMHG | OXYGEN SATURATION: 98 % | TEMPERATURE: 99 F | DIASTOLIC BLOOD PRESSURE: 66 MMHG

## 2021-09-17 DIAGNOSIS — Z17.0 MALIGNANT NEOPLASM OF OVERLAPPING SITES OF RIGHT BREAST IN FEMALE, ESTROGEN RECEPTOR POSITIVE (HCC): Primary | ICD-10-CM

## 2021-09-17 DIAGNOSIS — C50.811 MALIGNANT NEOPLASM OF OVERLAPPING SITES OF RIGHT BREAST IN FEMALE, ESTROGEN RECEPTOR POSITIVE (HCC): Primary | ICD-10-CM

## 2021-09-17 PROCEDURE — 77412 RADIATION TX DELIVERY LVL 3: CPT | Performed by: INTERNAL MEDICINE

## 2021-09-17 PROCEDURE — 77387 GUIDANCE FOR RADJ TX DLVR: CPT | Performed by: INTERNAL MEDICINE

## 2021-09-17 PROCEDURE — 77336 RADIATION PHYSICS CONSULT: CPT | Performed by: INTERNAL MEDICINE

## 2021-09-20 PROCEDURE — 77387 GUIDANCE FOR RADJ TX DLVR: CPT | Performed by: INTERNAL MEDICINE

## 2021-09-20 PROCEDURE — 77412 RADIATION TX DELIVERY LVL 3: CPT | Performed by: INTERNAL MEDICINE

## 2021-09-21 PROCEDURE — 77412 RADIATION TX DELIVERY LVL 3: CPT | Performed by: INTERNAL MEDICINE

## 2021-09-21 PROCEDURE — 77387 GUIDANCE FOR RADJ TX DLVR: CPT | Performed by: INTERNAL MEDICINE

## 2021-09-22 ENCOUNTER — OFFICE VISIT (OUTPATIENT)
Dept: OBGYN CLINIC | Facility: CLINIC | Age: 51
End: 2021-09-22
Payer: COMMERCIAL

## 2021-09-22 VITALS
SYSTOLIC BLOOD PRESSURE: 110 MMHG | WEIGHT: 137 LBS | BODY MASS INDEX: 20.76 KG/M2 | DIASTOLIC BLOOD PRESSURE: 62 MMHG | HEIGHT: 68 IN

## 2021-09-22 DIAGNOSIS — N90.9 NONINFLAMMATORY DISORDER OF VULVA AND PERINEUM: Primary | ICD-10-CM

## 2021-09-22 DIAGNOSIS — N90.89 VULVAR SKIN TAG: ICD-10-CM

## 2021-09-22 PROCEDURE — 77387 GUIDANCE FOR RADJ TX DLVR: CPT | Performed by: INTERNAL MEDICINE

## 2021-09-22 PROCEDURE — 11200 RMVL SKIN TAGS UP TO&INC 15: CPT | Performed by: STUDENT IN AN ORGANIZED HEALTH CARE EDUCATION/TRAINING PROGRAM

## 2021-09-22 PROCEDURE — 88305 TISSUE EXAM BY PATHOLOGIST: CPT | Performed by: STUDENT IN AN ORGANIZED HEALTH CARE EDUCATION/TRAINING PROGRAM

## 2021-09-22 PROCEDURE — 3078F DIAST BP <80 MM HG: CPT | Performed by: STUDENT IN AN ORGANIZED HEALTH CARE EDUCATION/TRAINING PROGRAM

## 2021-09-22 PROCEDURE — 77412 RADIATION TX DELIVERY LVL 3: CPT | Performed by: INTERNAL MEDICINE

## 2021-09-22 PROCEDURE — 3074F SYST BP LT 130 MM HG: CPT | Performed by: STUDENT IN AN ORGANIZED HEALTH CARE EDUCATION/TRAINING PROGRAM

## 2021-09-22 PROCEDURE — 99204 OFFICE O/P NEW MOD 45 MIN: CPT | Performed by: STUDENT IN AN ORGANIZED HEALTH CARE EDUCATION/TRAINING PROGRAM

## 2021-09-22 PROCEDURE — 3008F BODY MASS INDEX DOCD: CPT | Performed by: STUDENT IN AN ORGANIZED HEALTH CARE EDUCATION/TRAINING PROGRAM

## 2021-09-22 NOTE — PROGRESS NOTES
Chelsi Shah is a 49year old female [id number] Patient's last menstrual period was 08/17/2021. Patient presents with: Other: little polyps around labis  . CC: vulvar lesion    HPI: referred by PCP for vulvar lesion.  Pt thought they were skin tags, PCP Location: [facility] MAIN OR   • Other surgical history Bilateral 6/29/2016    Procedure: BREAST RECONSTRUCTION/ IMMEDIATE/EXPANDER;  Surgeon: Rory Ku MD;  Location: [facility] MAIN OR   • Umbilical hernia repair  3/5/2014    Procedure:  HERNIA UMBILICAL REPAIR Insecurity:       Worried About Running Out of Food in the Last Year: Not on file      Ran Out of Food in the Last Year: Not on file  Transportation Needs:       Lack of Transportation (Medical): Not on file      Lack of Transportation (Non-Medical):  Not o Tab, CHEW AND SWALLOW 1 TABLET(81 MG) BY MOUTH DAILY, Disp: 90 tablet, Rfl: 0  •  traZODone 50 MG Oral Tab, Take 1 tablet (50 mg total) by mouth nightly., Disp: 30 tablet, Rfl: 0  •  Mometasone Furoate 0.1 % External Cream, Apply BID during radiation thera

## 2021-09-23 PROCEDURE — 77412 RADIATION TX DELIVERY LVL 3: CPT | Performed by: INTERNAL MEDICINE

## 2021-09-23 PROCEDURE — 77387 GUIDANCE FOR RADJ TX DLVR: CPT | Performed by: INTERNAL MEDICINE

## 2021-09-23 NOTE — PROGRESS NOTES
Vulvar Biopsy Procedure Note    Indications: 49year old y/o female with painful vulvar skin tags    Pre-procedure diagnosis:   Vulvar lesion    Post-procedure diagnosis:  Vulvar lesion    Procedure:  Vulvar Biopsy/excision of vulvar skin tags     Procedur

## 2021-09-24 ENCOUNTER — HOSPITAL ENCOUNTER (OUTPATIENT)
Dept: RADIATION ONCOLOGY | Facility: HOSPITAL | Age: 51
Discharge: HOME | End: 2021-09-24
Attending: INTERNAL MEDICINE
Payer: COMMERCIAL

## 2021-09-24 VITALS
TEMPERATURE: 99 F | RESPIRATION RATE: 21 BRPM | HEART RATE: 77 BPM | DIASTOLIC BLOOD PRESSURE: 66 MMHG | OXYGEN SATURATION: 98 % | SYSTOLIC BLOOD PRESSURE: 107 MMHG

## 2021-09-24 DIAGNOSIS — Z17.0 MALIGNANT NEOPLASM OF OVERLAPPING SITES OF RIGHT BREAST IN FEMALE, ESTROGEN RECEPTOR POSITIVE (HCC): Primary | ICD-10-CM

## 2021-09-24 DIAGNOSIS — C50.811 MALIGNANT NEOPLASM OF OVERLAPPING SITES OF RIGHT BREAST IN FEMALE, ESTROGEN RECEPTOR POSITIVE (HCC): Primary | ICD-10-CM

## 2021-09-24 PROCEDURE — 77412 RADIATION TX DELIVERY LVL 3: CPT | Performed by: INTERNAL MEDICINE

## 2021-09-24 PROCEDURE — 77387 GUIDANCE FOR RADJ TX DLVR: CPT | Performed by: INTERNAL MEDICINE

## 2021-09-24 PROCEDURE — 77336 RADIATION PHYSICS CONSULT: CPT | Performed by: INTERNAL MEDICINE

## 2021-09-24 NOTE — PROGRESS NOTES
[facility] Radiation Treatment Management Note 26-30    Patient:  Cathi Ambriz  Age:  49year old  Visit Diagnosis:  Recurrent R breast cancer, ER+  Primary Rad/Onc:  Dr. Denise Estrada    Site Delivered Dose (cGy) Prescribed Dose (cG

## 2021-09-27 PROCEDURE — 77387 GUIDANCE FOR RADJ TX DLVR: CPT | Performed by: INTERNAL MEDICINE

## 2021-09-27 PROCEDURE — 77412 RADIATION TX DELIVERY LVL 3: CPT | Performed by: INTERNAL MEDICINE

## 2021-09-28 PROCEDURE — 77412 RADIATION TX DELIVERY LVL 3: CPT | Performed by: INTERNAL MEDICINE

## 2021-09-28 PROCEDURE — 77387 GUIDANCE FOR RADJ TX DLVR: CPT | Performed by: INTERNAL MEDICINE

## 2021-09-29 PROCEDURE — 77412 RADIATION TX DELIVERY LVL 3: CPT | Performed by: INTERNAL MEDICINE

## 2021-09-29 PROCEDURE — 77387 GUIDANCE FOR RADJ TX DLVR: CPT | Performed by: INTERNAL MEDICINE

## 2021-09-30 PROCEDURE — 77387 GUIDANCE FOR RADJ TX DLVR: CPT | Performed by: INTERNAL MEDICINE

## 2021-09-30 PROCEDURE — 77412 RADIATION TX DELIVERY LVL 3: CPT | Performed by: INTERNAL MEDICINE

## 2021-10-01 ENCOUNTER — HOSPITAL ENCOUNTER (OUTPATIENT)
Dept: RADIATION ONCOLOGY | Facility: HOSPITAL | Age: 51
Discharge: HOME | End: 2021-10-01
Attending: INTERNAL MEDICINE
Payer: COMMERCIAL

## 2021-10-01 VITALS
OXYGEN SATURATION: 99 % | DIASTOLIC BLOOD PRESSURE: 75 MMHG | SYSTOLIC BLOOD PRESSURE: 122 MMHG | RESPIRATION RATE: 16 BRPM | TEMPERATURE: 98 F | HEART RATE: 82 BPM

## 2021-10-01 DIAGNOSIS — C50.811 MALIGNANT NEOPLASM OF OVERLAPPING SITES OF RIGHT BREAST IN FEMALE, ESTROGEN RECEPTOR POSITIVE (HCC): Primary | ICD-10-CM

## 2021-10-01 DIAGNOSIS — Z17.0 MALIGNANT NEOPLASM OF OVERLAPPING SITES OF RIGHT BREAST IN FEMALE, ESTROGEN RECEPTOR POSITIVE (HCC): Primary | ICD-10-CM

## 2021-10-01 PROCEDURE — 77387 GUIDANCE FOR RADJ TX DLVR: CPT | Performed by: INTERNAL MEDICINE

## 2021-10-01 PROCEDURE — 77336 RADIATION PHYSICS CONSULT: CPT | Performed by: INTERNAL MEDICINE

## 2021-10-01 PROCEDURE — 77412 RADIATION TX DELIVERY LVL 3: CPT | Performed by: INTERNAL MEDICINE

## 2021-10-01 NOTE — PROGRESS NOTES
[facility] Radiation Treatment Management Note 26-30    Patient:  Delio Pierre  Age:  49year old  Visit Diagnosis:  Recurrent R breast cancer, ER+  Primary Rad/Onc:  Dr. Moustapha Olsen    Site Delivered Dose (cGy) Prescribed Dose (cG Sierra Mccoy MD

## 2021-10-04 ENCOUNTER — NURSE NAVIGATOR ENCOUNTER (OUTPATIENT)
Dept: HEMATOLOGY/ONCOLOGY | Facility: HOSPITAL | Age: 51
End: 2021-10-04

## 2021-10-04 ENCOUNTER — APPOINTMENT (OUTPATIENT)
Dept: RADIATION ONCOLOGY | Facility: HOSPITAL | Age: 51
End: 2021-10-04
Attending: INTERNAL MEDICINE
Payer: COMMERCIAL

## 2021-10-04 PROCEDURE — 77412 RADIATION TX DELIVERY LVL 3: CPT | Performed by: INTERNAL MEDICINE

## 2021-10-04 PROCEDURE — 77321 SPECIAL TELETX PORT PLAN: CPT | Performed by: INTERNAL MEDICINE

## 2021-10-04 PROCEDURE — 77290 THER RAD SIMULAJ FIELD CPLX: CPT | Performed by: INTERNAL MEDICINE

## 2021-10-04 PROCEDURE — 77334 RADIATION TREATMENT AID(S): CPT | Performed by: INTERNAL MEDICINE

## 2021-10-04 NOTE — PROGRESS NOTES
Called patient in regards to how her radiation treatment is going and when it will be ending. Patient stated it is overall going well with some side effects to her skin. She stated her last radiation should be October 14th.  I stated to her that she will ne

## 2021-10-05 ENCOUNTER — HOSPITAL ENCOUNTER (OUTPATIENT)
Dept: RADIATION ONCOLOGY | Facility: HOSPITAL | Age: 51
End: 2021-10-05
Attending: INTERNAL MEDICINE
Payer: COMMERCIAL

## 2021-10-05 PROCEDURE — 77387 GUIDANCE FOR RADJ TX DLVR: CPT | Performed by: INTERNAL MEDICINE

## 2021-10-05 PROCEDURE — 77412 RADIATION TX DELIVERY LVL 3: CPT | Performed by: INTERNAL MEDICINE

## 2021-10-06 ENCOUNTER — HOSPITAL ENCOUNTER (OUTPATIENT)
Dept: RADIATION ONCOLOGY | Facility: HOSPITAL | Age: 51
Discharge: HOME | End: 2021-10-06
Attending: INTERNAL MEDICINE
Payer: COMMERCIAL

## 2021-10-06 PROCEDURE — 77412 RADIATION TX DELIVERY LVL 3: CPT | Performed by: INTERNAL MEDICINE

## 2021-10-06 PROCEDURE — 77280 THER RAD SIMULAJ FIELD SMPL: CPT | Performed by: INTERNAL MEDICINE

## 2021-10-07 PROCEDURE — 77412 RADIATION TX DELIVERY LVL 3: CPT | Performed by: INTERNAL MEDICINE

## 2021-10-07 PROCEDURE — 77387 GUIDANCE FOR RADJ TX DLVR: CPT | Performed by: INTERNAL MEDICINE

## 2021-10-08 ENCOUNTER — APPOINTMENT (OUTPATIENT)
Dept: RADIATION ONCOLOGY | Facility: HOSPITAL | Age: 51
End: 2021-10-08
Payer: COMMERCIAL

## 2021-10-08 ENCOUNTER — HOSPITAL ENCOUNTER (OUTPATIENT)
Dept: RADIATION ONCOLOGY | Facility: HOSPITAL | Age: 51
Discharge: HOME | End: 2021-10-08
Attending: INTERNAL MEDICINE
Payer: COMMERCIAL

## 2021-10-08 VITALS
BODY MASS INDEX: 20 KG/M2 | HEART RATE: 73 BPM | DIASTOLIC BLOOD PRESSURE: 74 MMHG | OXYGEN SATURATION: 100 % | RESPIRATION RATE: 19 BRPM | TEMPERATURE: 97 F | SYSTOLIC BLOOD PRESSURE: 107 MMHG | WEIGHT: 134 LBS

## 2021-10-08 DIAGNOSIS — C50.811 MALIGNANT NEOPLASM OF OVERLAPPING SITES OF RIGHT BREAST IN FEMALE, ESTROGEN RECEPTOR POSITIVE (HCC): Primary | ICD-10-CM

## 2021-10-08 DIAGNOSIS — Z17.0 MALIGNANT NEOPLASM OF OVERLAPPING SITES OF RIGHT BREAST IN FEMALE, ESTROGEN RECEPTOR POSITIVE (HCC): Primary | ICD-10-CM

## 2021-10-08 PROCEDURE — 77412 RADIATION TX DELIVERY LVL 3: CPT | Performed by: INTERNAL MEDICINE

## 2021-10-08 PROCEDURE — 77387 GUIDANCE FOR RADJ TX DLVR: CPT | Performed by: INTERNAL MEDICINE

## 2021-10-08 PROCEDURE — 77336 RADIATION PHYSICS CONSULT: CPT | Performed by: INTERNAL MEDICINE

## 2021-10-08 RX ORDER — SULFAMETHOXAZOLE AND TRIMETHOPRIM 800; 160 MG/1; MG/1
TABLET ORAL
Qty: 14 TABLET | Refills: 0 | Status: SHIPPED | OUTPATIENT
Start: 2021-10-08 | End: 2021-11-24 | Stop reason: ALTCHOICE

## 2021-10-08 NOTE — PATIENT INSTRUCTIONS
POST-RADIATION INSTRUCTIONS:   - FOLLOW-UP WITH DR. CHING ON OCT. 22, 2021.  OUR  WILL CALL TO SCHEDULE A TIME.   - FOLLOW-UP WITH LILIBETH IN February, 2022 as Luis Coelho AFTER RADIATION THERAPY IS COMPLETED  - SIDE EF

## 2021-10-08 NOTE — PROGRESS NOTES
[facility] Radiation Treatment Management Note 31-35    Patient:  Stephanie Hollingsworth  Age:  49year old  Visit Diagnosis:  Recurrent R breast cancer, ER+  Primary Rad/Onc:  Dr. Mat Guerra    Site Delivered Dose (cGy) Prescribed Dose (cG

## 2021-10-11 PROCEDURE — 77387 GUIDANCE FOR RADJ TX DLVR: CPT | Performed by: INTERNAL MEDICINE

## 2021-10-11 PROCEDURE — 77412 RADIATION TX DELIVERY LVL 3: CPT | Performed by: INTERNAL MEDICINE

## 2021-10-12 PROCEDURE — 77387 GUIDANCE FOR RADJ TX DLVR: CPT | Performed by: INTERNAL MEDICINE

## 2021-10-12 PROCEDURE — 77412 RADIATION TX DELIVERY LVL 3: CPT | Performed by: INTERNAL MEDICINE

## 2021-10-13 PROCEDURE — 77412 RADIATION TX DELIVERY LVL 3: CPT | Performed by: INTERNAL MEDICINE

## 2021-10-13 PROCEDURE — 77387 GUIDANCE FOR RADJ TX DLVR: CPT | Performed by: INTERNAL MEDICINE

## 2021-10-14 ENCOUNTER — DOCUMENTATION ONLY (OUTPATIENT)
Dept: RADIATION ONCOLOGY | Facility: HOSPITAL | Age: 51
End: 2021-10-14

## 2021-10-14 PROCEDURE — 77387 GUIDANCE FOR RADJ TX DLVR: CPT | Performed by: INTERNAL MEDICINE

## 2021-10-14 PROCEDURE — 77412 RADIATION TX DELIVERY LVL 3: CPT | Performed by: INTERNAL MEDICINE

## 2021-10-14 PROCEDURE — 77336 RADIATION PHYSICS CONSULT: CPT | Performed by: INTERNAL MEDICINE

## 2021-10-21 NOTE — PROGRESS NOTES
Radiation Oncology Treatment Summary    Diagnosis: right breast invasive ductal carcinoma, pT1b (multifocal 0.3-0.8cm) N0 (0/3) cM0, overall stage IA, ER/[place]+ with close margin - now with right chest wall recurrence, invasive ductal/lobular carcinoma, grade kv kv match, PRIYANKS    Clinical Course: The treatment course was completed as prescribed. She tolerated treatment well with grade 1-2 dermatitis managed with mometasone and moisturizer.  At the end of treatment she developed dry desquamation and was started o

## 2021-10-22 ENCOUNTER — HOSPITAL ENCOUNTER (OUTPATIENT)
Dept: RADIATION ONCOLOGY | Facility: HOSPITAL | Age: 51
Discharge: HOME | End: 2021-10-22
Attending: INTERNAL MEDICINE
Payer: COMMERCIAL

## 2021-10-22 NOTE — PATIENT INSTRUCTIONS
- WE WILL CALL TO SCHEDULE A FOLLOW-UP WITH DR. CHING MID-NOVEMBER  - FOLLOW-UP WITH DR. Gordon Koenig AFTER RADIATION COMPLETION  - CALL IF YOU HAVE ANY QUESTIONS/CONCERNS REGARDING RADIATION THERAPY

## 2021-10-22 NOTE — PROGRESS NOTES
Nursing Follow-Up Note    Patient: Jenny Watt  YOB: 1970  Age: 49year old  Radiation Oncologist: Dr. Galo Dickey  Referring Physician: Thomas Corey  Chief Complaint: Patient presents with:   Follow - Up    Date: 10/22/2021

## 2021-10-22 NOTE — PROGRESS NOTES
Skin check. Majority of breast with healing dry desquamation. 2cm circular soft area in upper inner quad with a little more residual erythema. Very mildly ttp, non-fluctuant, no s/o cellulitis, no fevers, finishing abx. Healing well.  Will notify us n

## 2021-10-25 ENCOUNTER — TELEPHONE (OUTPATIENT)
Dept: RADIATION ONCOLOGY | Facility: HOSPITAL | Age: 51
End: 2021-10-25

## 2021-10-27 ENCOUNTER — NURSE NAVIGATOR ENCOUNTER (OUTPATIENT)
Dept: HEMATOLOGY/ONCOLOGY | Facility: HOSPITAL | Age: 51
End: 2021-10-27

## 2021-10-27 NOTE — PROGRESS NOTES
Phoned patient in regards to plan of care. Contact information provided if patient would like assistance with scheduling with Dr. Lucien Awad to discuss Tamoxifen, or if patient has decided not to proceed with therapy, to let us know.  Requested return phone ca

## 2021-10-28 ENCOUNTER — NURSE NAVIGATOR ENCOUNTER (OUTPATIENT)
Dept: HEMATOLOGY/ONCOLOGY | Facility: HOSPITAL | Age: 51
End: 2021-10-28

## 2021-10-28 NOTE — PROGRESS NOTES
Patient called back and I assisted her with scheduling a follow-up appointment with Dr. Dimas Cunningham for Thursday November 4, 2021 at 21 [phone] in the [facility].  Patient thanked me for the assistance and was provided with breast nurse navigator contac

## 2021-10-28 NOTE — PROGRESS NOTES
LMOVMTDENAE in regards to her voicemail about needing a follow-up appointment with Dr. Tobin Alvarado. Awaiting phone call back from patient.

## 2021-11-01 NOTE — PROGRESS NOTES
THE [facility] Hematology Oncology Group Progress Note      Patient Name: Kristin Estrada   YOB: 1970  Medical Record Number: [id number]  Consulting Physician: Dilcia Flores M.D.        Date of Visit: 11/4/2021      Chief Complaint  Invasive ductal of 12/2016 due to severe depression that began with the start of tamoxifen. Patient failed to follow up between 07/2017 and 01/2019. On 05/19/2021 she was seen by plastic surgery as follow up to her revision surgery.  She informed Dr. Susan Isaac of a sma breast or ovarian cancer. Social History (historical data, reviewed by physician)  Previous tobacco use but quit 1995; uses alcohol socially. Current Medications   tamoxifen 20 MG Oral Tab, Take 1 tablet (20 mg total) by mouth daily. , Disp: 30 ta 1.   Recurrent invasive ductal carcinoma, right breast: Patient with local recurrence of invasive ductal carcinoma involving the chest wall. She has undergone surgical resection followed by radiation therapy.            I recommend adjuvant endocrine ther SUE Mercedes Room Director of [place], [place]

## 2021-11-04 ENCOUNTER — OFFICE VISIT (OUTPATIENT)
Dept: HEMATOLOGY/ONCOLOGY | Facility: HOSPITAL | Age: 51
End: 2021-11-04
Attending: SPECIALIST
Payer: COMMERCIAL

## 2021-11-04 VITALS
SYSTOLIC BLOOD PRESSURE: 111 MMHG | TEMPERATURE: 98 F | DIASTOLIC BLOOD PRESSURE: 69 MMHG | BODY MASS INDEX: 20 KG/M2 | WEIGHT: 134 LBS | HEART RATE: 73 BPM | RESPIRATION RATE: 16 BRPM | OXYGEN SATURATION: 99 %

## 2021-11-04 DIAGNOSIS — C50.911 RECURRENT MALIGNANT NEOPLASM OF RIGHT BREAST (HCC): Primary | ICD-10-CM

## 2021-11-04 PROCEDURE — 99214 OFFICE O/P EST MOD 30 MIN: CPT | Performed by: SPECIALIST

## 2021-11-04 RX ORDER — TAMOXIFEN CITRATE 20 MG/1
20 TABLET ORAL DAILY
Qty: 30 TABLET | Refills: 0 | Status: SHIPPED | OUTPATIENT
Start: 2021-11-04 | End: 2021-12-30

## 2021-11-05 RX ORDER — TAMOXIFEN CITRATE 20 MG/1
TABLET ORAL
Qty: 90 TABLET | Refills: 0 | OUTPATIENT
Start: 2021-11-05

## 2021-11-27 ENCOUNTER — LAB ENCOUNTER (OUTPATIENT)
Dept: LAB | Facility: HOSPITAL | Age: 51
End: 2021-11-27
Attending: INTERNAL MEDICINE
Payer: COMMERCIAL

## 2021-11-27 DIAGNOSIS — Z01.818 PRE-OP TESTING: ICD-10-CM

## 2021-11-29 PROBLEM — F41.1 GAD (GENERALIZED ANXIETY DISORDER): Status: ACTIVE | Noted: 2019-06-19

## 2021-11-30 PROBLEM — Z12.11 SPECIAL SCREENING FOR MALIGNANT NEOPLASM OF COLON: Status: ACTIVE | Noted: 2021-11-30

## 2021-12-06 RX ORDER — TAMOXIFEN CITRATE 20 MG/1
TABLET ORAL
Qty: 30 TABLET | Refills: 0 | OUTPATIENT
Start: 2021-12-06

## 2021-12-06 NOTE — TELEPHONE ENCOUNTER
Return call from patient - stated she does not need a refill at this time as she has not started taking the Tamoxifen as of yet. Patient stated she will call to schedule her 2 week follow up appointment after starting the tamoxifen.

## 2021-12-06 NOTE — TELEPHONE ENCOUNTER
Left message on voicemail to confirm if patient is taking the Tamoxifen.  Notified we received a refill request from her pharmacy - instructed to return call

## 2021-12-14 ENCOUNTER — LAB ENCOUNTER (OUTPATIENT)
Dept: LAB | Facility: HOSPITAL | Age: 51
End: 2021-12-14
Attending: INTERNAL MEDICINE
Payer: COMMERCIAL

## 2021-12-14 DIAGNOSIS — E78.2 MIXED HYPERLIPIDEMIA: ICD-10-CM

## 2021-12-14 DIAGNOSIS — Z00.00 LABORATORY EXAM ORDERED AS PART OF ROUTINE GENERAL MEDICAL EXAMINATION: ICD-10-CM

## 2021-12-14 DIAGNOSIS — Z00.00 ROUTINE GENERAL MEDICAL EXAMINATION AT A HEALTH CARE FACILITY: ICD-10-CM

## 2021-12-14 PROCEDURE — 80061 LIPID PANEL: CPT

## 2021-12-14 PROCEDURE — 86803 HEPATITIS C AB TEST: CPT

## 2021-12-14 PROCEDURE — 36415 COLL VENOUS BLD VENIPUNCTURE: CPT

## 2021-12-14 PROCEDURE — 80053 COMPREHEN METABOLIC PANEL: CPT

## 2021-12-14 PROCEDURE — 85025 COMPLETE CBC W/AUTO DIFF WBC: CPT

## 2021-12-20 NOTE — PROGRESS NOTES
[facility] Progress Note      Patient Name: Sarah Black   YOB: 1970  Medical Record Number: [id number]  Consulting Physician: Alaina Mustafa M.D.        Date of Visit: 12/21/2021      Chief Complaint  Invasive ductal of 12/2016 due to severe depression that began with the start of tamoxifen. Patient failed to follow up between 07/2017 and 01/2019. On 05/19/2021 she was seen by plastic surgery as follow up to her revision surgery.  She informed Dr. Sheridan Garcia of a sma reviewed by physician)  Bilateral mastectomy (as above); bilateral breast reconstruction with silicone gel implants; umbilical hernia repair; revision of breast implants 08/2020 for recall; repair of ventral hernia.      Gynecologic History (historical data distress. Head   Normocephalic and atraumatic. Eyes   Conjunctiva clear; sclera anicteric.   ENMT                 External nose normal; external ears normal.  Lymphatics  No cervical or supraclavicular adenopathy; in the right axilla there is a small fir BSO. Given her history of toxicity with tamoxifen, will do this in a stepwise fashion. 2.   Depression: Patient remains on therapy in the care of psychiatry. 3.   Right axillary nodule:  We have arranged for axillary ultrasound to be performed today

## 2021-12-21 ENCOUNTER — OFFICE VISIT (OUTPATIENT)
Dept: HEMATOLOGY/ONCOLOGY | Facility: HOSPITAL | Age: 51
End: 2021-12-21
Attending: SPECIALIST
Payer: COMMERCIAL

## 2021-12-21 ENCOUNTER — HOSPITAL ENCOUNTER (OUTPATIENT)
Dept: MAMMOGRAPHY | Facility: HOSPITAL | Age: 51
Discharge: HOME | End: 2021-12-21
Attending: SPECIALIST
Payer: COMMERCIAL

## 2021-12-21 VITALS
TEMPERATURE: 98 F | DIASTOLIC BLOOD PRESSURE: 72 MMHG | HEIGHT: 67.76 IN | WEIGHT: 130 LBS | BODY MASS INDEX: 19.93 KG/M2 | OXYGEN SATURATION: 96 % | RESPIRATION RATE: 16 BRPM | HEART RATE: 94 BPM | SYSTOLIC BLOOD PRESSURE: 122 MMHG

## 2021-12-21 DIAGNOSIS — Z17.0 MALIGNANT NEOPLASM OF OVERLAPPING SITES OF RIGHT BREAST IN FEMALE, ESTROGEN RECEPTOR POSITIVE (HCC): Primary | ICD-10-CM

## 2021-12-21 DIAGNOSIS — C50.811 MALIGNANT NEOPLASM OF OVERLAPPING SITES OF RIGHT BREAST IN FEMALE, ESTROGEN RECEPTOR POSITIVE (HCC): Primary | ICD-10-CM

## 2021-12-21 DIAGNOSIS — D70.2 OTHER DRUG-INDUCED NEUTROPENIA (HCC): ICD-10-CM

## 2021-12-21 DIAGNOSIS — Z17.0 MALIGNANT NEOPLASM OF OVERLAPPING SITES OF RIGHT BREAST IN FEMALE, ESTROGEN RECEPTOR POSITIVE (HCC): ICD-10-CM

## 2021-12-21 DIAGNOSIS — C50.811 MALIGNANT NEOPLASM OF OVERLAPPING SITES OF RIGHT BREAST IN FEMALE, ESTROGEN RECEPTOR POSITIVE (HCC): ICD-10-CM

## 2021-12-21 PROCEDURE — 76882 US LMTD JT/FCL EVL NVASC XTR: CPT | Performed by: SPECIALIST

## 2021-12-21 PROCEDURE — 99214 OFFICE O/P EST MOD 30 MIN: CPT | Performed by: SPECIALIST

## 2021-12-21 RX ORDER — VITAMIN E 268 MG
1000 CAPSULE ORAL DAILY
COMMUNITY

## 2021-12-27 DIAGNOSIS — I77.71 CAROTID ARTERY DISSECTION (HCC): ICD-10-CM

## 2021-12-27 NOTE — TELEPHONE ENCOUNTER
Medication: ASPIRIN 81 MG     Date of last refill: 8/27/21 (#90/0R)  [place]     Last office visit: 2/17/21  Due back to clinic per last office note:  6 mon  Date next office visit scheduled:    Future Appointments

## 2021-12-28 RX ORDER — ASPIRIN 81 MG/1
TABLET, CHEWABLE ORAL
Qty: 90 TABLET | Refills: 0 | Status: SHIPPED | OUTPATIENT
Start: 2021-12-28

## 2022-01-03 RX ORDER — TAMOXIFEN CITRATE 20 MG/1
TABLET ORAL
Qty: 90 TABLET | Refills: 0 | Status: SHIPPED | OUTPATIENT
Start: 2022-01-03 | End: 2022-04-04

## 2022-01-20 ENCOUNTER — OFFICE VISIT (OUTPATIENT)
Dept: HEMATOLOGY/ONCOLOGY | Facility: HOSPITAL | Age: 52
End: 2022-01-20
Attending: SPECIALIST
Payer: COMMERCIAL

## 2022-01-20 VITALS
HEIGHT: 67.76 IN | HEART RATE: 78 BPM | BODY MASS INDEX: 20.09 KG/M2 | SYSTOLIC BLOOD PRESSURE: 120 MMHG | TEMPERATURE: 98 F | WEIGHT: 131 LBS | DIASTOLIC BLOOD PRESSURE: 74 MMHG | OXYGEN SATURATION: 99 % | RESPIRATION RATE: 18 BRPM

## 2022-01-20 DIAGNOSIS — Z17.0 MALIGNANT NEOPLASM OF OVERLAPPING SITES OF RIGHT BREAST IN FEMALE, ESTROGEN RECEPTOR POSITIVE (HCC): ICD-10-CM

## 2022-01-20 DIAGNOSIS — D70.2 OTHER DRUG-INDUCED NEUTROPENIA (HCC): Primary | ICD-10-CM

## 2022-01-20 DIAGNOSIS — C50.811 MALIGNANT NEOPLASM OF OVERLAPPING SITES OF RIGHT BREAST IN FEMALE, ESTROGEN RECEPTOR POSITIVE (HCC): ICD-10-CM

## 2022-01-20 LAB
BASOPHILS # BLD AUTO: 0.03 X10(3) UL (ref 0–0.2)
BASOPHILS NFR BLD AUTO: 0.5 %
EOSINOPHIL # BLD AUTO: 0.07 X10(3) UL (ref 0–0.7)
EOSINOPHIL NFR BLD AUTO: 1.2 %
ERYTHROCYTE [DISTWIDTH] IN BLOOD BY AUTOMATED COUNT: 13.1 %
HCT VFR BLD AUTO: 38.9 %
HGB BLD-MCNC: 12.6 G/DL
IMM GRANULOCYTES # BLD AUTO: 0.01 X10(3) UL (ref 0–1)
IMM GRANULOCYTES NFR BLD: 0.2 %
LYMPHOCYTES # BLD AUTO: 1.12 X10(3) UL (ref 1–4)
LYMPHOCYTES NFR BLD AUTO: 19.8 %
MCH RBC QN AUTO: 31.5 PG (ref 26–34)
MCHC RBC AUTO-ENTMCNC: 32.4 G/DL (ref 31–37)
MCV RBC AUTO: 97.3 FL
MONOCYTES # BLD AUTO: 0.4 X10(3) UL (ref 0.1–1)
MONOCYTES NFR BLD AUTO: 7.1 %
NEUTROPHILS # BLD AUTO: 4.04 X10 (3) UL (ref 1.5–7.7)
NEUTROPHILS # BLD AUTO: 4.04 X10(3) UL (ref 1.5–7.7)
NEUTROPHILS NFR BLD AUTO: 71.2 %
PLATELET # BLD AUTO: 215 10(3)UL (ref 150–450)
RBC # BLD AUTO: 4 X10(6)UL
WBC # BLD AUTO: 5.7 X10(3) UL (ref 4–11)

## 2022-01-20 PROCEDURE — 99214 OFFICE O/P EST MOD 30 MIN: CPT | Performed by: SPECIALIST

## 2022-01-20 NOTE — PROGRESS NOTES
[facility]ematology Oncology Group Progress Note      Patient Name: Liz Rivero   YOB: 1970  Medical Record Number: [id number]  Consulting Physician: Marco Pacheco M.D.        Date of Visit: 1/20/2022      Chief Complaint  Invasive ductal of 12/2016 due to severe depression that began with the start of tamoxifen. Patient failed to follow up between 07/2017 and 01/2019. On 05/19/2021 she was seen by plastic surgery as follow up to her revision surgery.  She informed Dr. Nayely Knowles of a sma 2020 for recall; repair of ventral hernia. Gynecologic History (historical data, reviewed by physician)  Menarche age 16 years; ; OCP use x 24 years.     Family History (historical data, reviewed by physician)  No family history of breast or ovar cervical or supraclavicular or axillary adenopathy. Breasts   Bilateral breast reconstruction; no suspicious masses. Respiratory          Normal effort; no respiratory distress; clear to auscultation bilaterally.   Cardiovascular  Regular rate and rhythm at her next visit in 3 months. 2.   Depression: Patient remains on therapy in the care of psychiatry. 3.   Neutropenia: White count now normal. Previous neutropenia related to COVID booster injection.      Planned Follow Up   Patient will return for

## 2022-02-22 ENCOUNTER — OFFICE VISIT (OUTPATIENT)
Dept: SURGERY | Facility: CLINIC | Age: 52
End: 2022-02-22
Payer: COMMERCIAL

## 2022-02-22 VITALS
BODY MASS INDEX: 20.21 KG/M2 | SYSTOLIC BLOOD PRESSURE: 111 MMHG | HEART RATE: 73 BPM | OXYGEN SATURATION: 97 % | DIASTOLIC BLOOD PRESSURE: 72 MMHG | RESPIRATION RATE: 18 BRPM | WEIGHT: 131.81 LBS | HEIGHT: 67.76 IN | TEMPERATURE: 98 F

## 2022-02-22 DIAGNOSIS — C50.811 MALIGNANT NEOPLASM OF OVERLAPPING SITES OF RIGHT BREAST IN FEMALE, ESTROGEN RECEPTOR POSITIVE (HCC): Primary | ICD-10-CM

## 2022-02-22 DIAGNOSIS — C50.911 RECURRENT BREAST CANCER, RIGHT (HCC): ICD-10-CM

## 2022-02-22 DIAGNOSIS — Z17.0 MALIGNANT NEOPLASM OF OVERLAPPING SITES OF RIGHT BREAST IN FEMALE, ESTROGEN RECEPTOR POSITIVE (HCC): Primary | ICD-10-CM

## 2022-02-22 DIAGNOSIS — Z90.13 ACQUIRED ABSENCE OF BILATERAL BREASTS AND NIPPLES: ICD-10-CM

## 2022-02-22 PROCEDURE — 3078F DIAST BP <80 MM HG: CPT | Performed by: SURGERY

## 2022-02-22 PROCEDURE — 99213 OFFICE O/P EST LOW 20 MIN: CPT | Performed by: SURGERY

## 2022-02-22 PROCEDURE — 3008F BODY MASS INDEX DOCD: CPT | Performed by: SURGERY

## 2022-02-22 PROCEDURE — 3074F SYST BP LT 130 MM HG: CPT | Performed by: SURGERY

## 2022-04-04 RX ORDER — TAMOXIFEN CITRATE 20 MG/1
TABLET ORAL
Qty: 90 TABLET | Refills: 0 | Status: SHIPPED | OUTPATIENT
Start: 2022-04-04

## 2022-04-21 ENCOUNTER — OFFICE VISIT (OUTPATIENT)
Dept: HEMATOLOGY/ONCOLOGY | Facility: HOSPITAL | Age: 52
End: 2022-04-21
Attending: SPECIALIST
Payer: COMMERCIAL

## 2022-04-21 VITALS
TEMPERATURE: 97 F | WEIGHT: 127.5 LBS | HEART RATE: 66 BPM | HEIGHT: 67.76 IN | RESPIRATION RATE: 16 BRPM | OXYGEN SATURATION: 100 % | DIASTOLIC BLOOD PRESSURE: 74 MMHG | SYSTOLIC BLOOD PRESSURE: 106 MMHG | BODY MASS INDEX: 19.55 KG/M2

## 2022-04-21 DIAGNOSIS — F33.41 RECURRENT MAJOR DEPRESSIVE DISORDER, IN PARTIAL REMISSION (HCC): ICD-10-CM

## 2022-04-21 DIAGNOSIS — R63.4 UNINTENTIONAL WEIGHT LOSS: ICD-10-CM

## 2022-04-21 DIAGNOSIS — C50.811 MALIGNANT NEOPLASM OF OVERLAPPING SITES OF RIGHT BREAST IN FEMALE, ESTROGEN RECEPTOR POSITIVE (HCC): ICD-10-CM

## 2022-04-21 DIAGNOSIS — Z17.0 MALIGNANT NEOPLASM OF OVERLAPPING SITES OF RIGHT BREAST IN FEMALE, ESTROGEN RECEPTOR POSITIVE (HCC): ICD-10-CM

## 2022-04-21 DIAGNOSIS — C50.911 RECURRENT MALIGNANT NEOPLASM OF RIGHT BREAST (HCC): Primary | ICD-10-CM

## 2022-04-21 LAB
ALBUMIN SERPL-MCNC: 4.1 G/DL (ref 3.4–5)
ALBUMIN/GLOB SERPL: 1.4 {RATIO} (ref 1–2)
ALP LIVER SERPL-CCNC: 20 U/L
ALT SERPL-CCNC: 18 U/L
ANION GAP SERPL CALC-SCNC: 6 MMOL/L (ref 0–18)
AST SERPL-CCNC: 18 U/L (ref 15–37)
BASOPHILS # BLD AUTO: 0.03 X10(3) UL (ref 0–0.2)
BASOPHILS NFR BLD AUTO: 0.4 %
BILIRUB SERPL-MCNC: 1.2 MG/DL (ref 0.1–2)
BUN BLD-MCNC: 23 MG/DL (ref 7–18)
CALCIUM BLD-MCNC: 9.8 MG/DL (ref 8.5–10.1)
CHLORIDE SERPL-SCNC: 106 MMOL/L (ref 98–112)
CO2 SERPL-SCNC: 26 MMOL/L (ref 21–32)
CREAT BLD-MCNC: 1.02 MG/DL
EOSINOPHIL # BLD AUTO: 0.08 X10(3) UL (ref 0–0.7)
EOSINOPHIL NFR BLD AUTO: 1 %
ERYTHROCYTE [DISTWIDTH] IN BLOOD BY AUTOMATED COUNT: 13 %
GLOBULIN PLAS-MCNC: 3 G/DL (ref 2.8–4.4)
GLUCOSE BLD-MCNC: 99 MG/DL (ref 70–99)
HCT VFR BLD AUTO: 36.7 %
HGB BLD-MCNC: 12.6 G/DL
IMM GRANULOCYTES # BLD AUTO: 0.03 X10(3) UL (ref 0–1)
IMM GRANULOCYTES NFR BLD: 0.4 %
LYMPHOCYTES # BLD AUTO: 1.4 X10(3) UL (ref 1–4)
LYMPHOCYTES NFR BLD AUTO: 18.1 %
MCH RBC QN AUTO: 33.2 PG (ref 26–34)
MCHC RBC AUTO-ENTMCNC: 34.3 G/DL (ref 31–37)
MCV RBC AUTO: 96.8 FL
MONOCYTES # BLD AUTO: 0.59 X10(3) UL (ref 0.1–1)
MONOCYTES NFR BLD AUTO: 7.6 %
NEUTROPHILS # BLD AUTO: 5.62 X10 (3) UL (ref 1.5–7.7)
NEUTROPHILS # BLD AUTO: 5.62 X10(3) UL (ref 1.5–7.7)
NEUTROPHILS NFR BLD AUTO: 72.5 %
OSMOLALITY SERPL CALC.SUM OF ELEC: 290 MOSM/KG (ref 275–295)
PLATELET # BLD AUTO: 210 10(3)UL (ref 150–450)
POTASSIUM SERPL-SCNC: 4 MMOL/L (ref 3.5–5.1)
PROT SERPL-MCNC: 7.1 G/DL (ref 6.4–8.2)
RBC # BLD AUTO: 3.79 X10(6)UL
SODIUM SERPL-SCNC: 138 MMOL/L (ref 136–145)
TSI SER-ACNC: 1.4 MIU/ML (ref 0.36–3.74)
WBC # BLD AUTO: 7.8 X10(3) UL (ref 4–11)

## 2022-04-21 PROCEDURE — 99214 OFFICE O/P EST MOD 30 MIN: CPT | Performed by: SPECIALIST

## 2022-04-21 RX ORDER — BLACK COHOSH ROOT EXTRACT 80 MG
2 CAPSULE ORAL DAILY
COMMUNITY

## 2022-04-21 NOTE — PROGRESS NOTES
Patient is here today for follow up with Echo Covarrubias for Malignant neoplasm right breast. Has been on Tamoxifen for approximately 4 months. Patient denies pain. Stated some depression symptoms - patient stated - has a lot of things going on in her personal life - is not sure the Tamoxifen is the only culprit causing depressive symptoms. Denied need to see a . Stated sees a counselor regularly. Mild to moderate hot flashes. Last period stated \"heavy\" was about a month ago. C/O weight loss. Medication list, medical history and toxicities  were reviewed and updated. Education Record    Learner:  Patient    Disease / Diagnosis: Malignant neoplasm right breast    Barriers / Limitations:  None   Comments:    Method:  Brief focused, Discussion, Printed material and Reinforcement   Comments:    General Topics:  Medication, Pain, Procedure and Plan of care reviewed   Comments:    Outcome:  Shows understanding   Comments:    AVS provided and follow up reviewed. Patient instructed to call as needed.

## 2022-04-22 ENCOUNTER — PATIENT MESSAGE (OUTPATIENT)
Dept: HEMATOLOGY/ONCOLOGY | Facility: HOSPITAL | Age: 52
End: 2022-04-22

## 2022-04-25 ENCOUNTER — VIRTUAL PHONE E/M (OUTPATIENT)
Dept: HEMATOLOGY/ONCOLOGY | Facility: HOSPITAL | Age: 52
End: 2022-04-25
Attending: SPECIALIST
Payer: COMMERCIAL

## 2022-04-25 ENCOUNTER — PATIENT MESSAGE (OUTPATIENT)
Dept: INTERNAL MEDICINE CLINIC | Facility: CLINIC | Age: 52
End: 2022-04-25

## 2022-04-25 DIAGNOSIS — C50.811 MALIGNANT NEOPLASM OF OVERLAPPING SITES OF RIGHT BREAST IN FEMALE, ESTROGEN RECEPTOR NEGATIVE (HCC): ICD-10-CM

## 2022-04-25 DIAGNOSIS — C50.811 MALIGNANT NEOPLASM OF OVERLAPPING SITES OF RIGHT FEMALE BREAST (HCC): Primary | ICD-10-CM

## 2022-04-25 DIAGNOSIS — R63.4 WEIGHT LOSS, UNINTENTIONAL: ICD-10-CM

## 2022-04-25 DIAGNOSIS — E78.2 MIXED HYPERLIPIDEMIA: Primary | ICD-10-CM

## 2022-04-25 DIAGNOSIS — Z17.1 MALIGNANT NEOPLASM OF OVERLAPPING SITES OF RIGHT BREAST IN FEMALE, ESTROGEN RECEPTOR NEGATIVE (HCC): ICD-10-CM

## 2022-04-25 NOTE — TELEPHONE ENCOUNTER
From: Barrington Villarreal  To: Josselyn Boyer MD  Sent: 4/25/2022 2:16 PM CDT  Subject: Covid    Hi, Dr. Anthonette Cabot,  I just thought I would let you know that I tested positive for Covid today. I got my second booster Saturday, having no idea that I was likely positive at the time. Also, Dr. Marky Deal, my oncologist, had me talk to a nutritionist because I have had some weight loss. She suggested that I get my blood work done for vitamin B 12, vitamin D, and iron. I was hoping that you could order that test for me. I hope you are well.    Elizabeth Pike

## 2022-05-04 RX ORDER — TAMOXIFEN CITRATE 20 MG/1
TABLET ORAL
Qty: 90 TABLET | Refills: 0 | Status: SHIPPED | OUTPATIENT
Start: 2022-05-04

## 2022-06-15 ENCOUNTER — LAB ENCOUNTER (OUTPATIENT)
Dept: LAB | Age: 52
End: 2022-06-15
Attending: INTERNAL MEDICINE
Payer: COMMERCIAL

## 2022-06-15 DIAGNOSIS — R63.4 WEIGHT LOSS, UNINTENTIONAL: ICD-10-CM

## 2022-06-15 LAB
DEPRECATED HBV CORE AB SER IA-ACNC: 30.3 NG/ML
FOLATE SERPL-MCNC: 46.5 NG/ML (ref 8.7–?)
IRON SATN MFR SERPL: 28 %
IRON SERPL-MCNC: 116 UG/DL
TIBC SERPL-MCNC: 414 UG/DL (ref 240–450)
TRANSFERRIN SERPL-MCNC: 278 MG/DL (ref 200–360)
VIT B12 SERPL-MCNC: 809 PG/ML (ref 193–986)
VIT D+METAB SERPL-MCNC: 30.1 NG/ML (ref 30–100)

## 2022-06-15 PROCEDURE — 83550 IRON BINDING TEST: CPT | Performed by: INTERNAL MEDICINE

## 2022-06-15 PROCEDURE — 82607 VITAMIN B-12: CPT | Performed by: INTERNAL MEDICINE

## 2022-06-15 PROCEDURE — 82306 VITAMIN D 25 HYDROXY: CPT | Performed by: INTERNAL MEDICINE

## 2022-06-15 PROCEDURE — 83540 ASSAY OF IRON: CPT | Performed by: INTERNAL MEDICINE

## 2022-06-15 PROCEDURE — 82728 ASSAY OF FERRITIN: CPT | Performed by: INTERNAL MEDICINE

## 2022-06-15 PROCEDURE — 82746 ASSAY OF FOLIC ACID SERUM: CPT | Performed by: INTERNAL MEDICINE

## 2022-07-21 ENCOUNTER — OFFICE VISIT (OUTPATIENT)
Dept: HEMATOLOGY/ONCOLOGY | Facility: HOSPITAL | Age: 52
End: 2022-07-21
Attending: SPECIALIST
Payer: COMMERCIAL

## 2022-07-21 VITALS
SYSTOLIC BLOOD PRESSURE: 122 MMHG | WEIGHT: 128 LBS | OXYGEN SATURATION: 99 % | DIASTOLIC BLOOD PRESSURE: 74 MMHG | TEMPERATURE: 98 F | RESPIRATION RATE: 16 BRPM | BODY MASS INDEX: 20 KG/M2 | HEART RATE: 84 BPM

## 2022-07-21 DIAGNOSIS — C50.811 MALIGNANT NEOPLASM OF OVERLAPPING SITES OF RIGHT BREAST IN FEMALE, ESTROGEN RECEPTOR POSITIVE (HCC): Primary | ICD-10-CM

## 2022-07-21 DIAGNOSIS — Z17.0 MALIGNANT NEOPLASM OF OVERLAPPING SITES OF RIGHT BREAST IN FEMALE, ESTROGEN RECEPTOR POSITIVE (HCC): Primary | ICD-10-CM

## 2022-07-21 DIAGNOSIS — F33.41 RECURRENT MAJOR DEPRESSIVE DISORDER, IN PARTIAL REMISSION (HCC): ICD-10-CM

## 2022-07-21 DIAGNOSIS — Z79.810 LONG-TERM CURRENT USE OF TAMOXIFEN: ICD-10-CM

## 2022-07-21 PROCEDURE — 99214 OFFICE O/P EST MOD 30 MIN: CPT | Performed by: SPECIALIST

## 2022-07-21 NOTE — PROGRESS NOTES
Patient is here today for follow up with Vj Chung for Malignant neoplasm Right Breast - patient is currently on Tamoxifen therapy. Patient denies pain. Stated noted some hair loss. Medication list and medical history were reviewed and updated. Education Record    Learner:  Patient    Disease / Diagnosis: Malignant neoplasm Right     Barriers / Limitations:  None   Comments:    Method:  Brief focused, Discussion, Printed material and Reinforcement   Comments:    General Topics:  Medication, Pain, Procedure and Plan of care reviewed   Comments:    Outcome:  Shows understanding   Comments:    AVS provided and follow up reviewed. Patient instructed to call as needed.

## 2022-09-26 ENCOUNTER — OFFICE VISIT (OUTPATIENT)
Dept: SURGERY | Facility: CLINIC | Age: 52
End: 2022-09-26
Payer: COMMERCIAL

## 2022-09-26 VITALS
WEIGHT: 127 LBS | HEART RATE: 75 BPM | SYSTOLIC BLOOD PRESSURE: 125 MMHG | RESPIRATION RATE: 16 BRPM | TEMPERATURE: 98 F | HEIGHT: 67.76 IN | DIASTOLIC BLOOD PRESSURE: 81 MMHG | OXYGEN SATURATION: 99 % | BODY MASS INDEX: 19.47 KG/M2

## 2022-09-26 DIAGNOSIS — Z90.13 ACQUIRED ABSENCE OF BILATERAL BREASTS AND NIPPLES: ICD-10-CM

## 2022-09-26 DIAGNOSIS — Z17.0 MALIGNANT NEOPLASM OF OVERLAPPING SITES OF RIGHT BREAST IN FEMALE, ESTROGEN RECEPTOR POSITIVE (HCC): Primary | ICD-10-CM

## 2022-09-26 DIAGNOSIS — C50.811 MALIGNANT NEOPLASM OF OVERLAPPING SITES OF RIGHT BREAST IN FEMALE, ESTROGEN RECEPTOR POSITIVE (HCC): Primary | ICD-10-CM

## 2022-09-26 PROCEDURE — 3008F BODY MASS INDEX DOCD: CPT | Performed by: SURGERY

## 2022-09-26 PROCEDURE — 3079F DIAST BP 80-89 MM HG: CPT | Performed by: SURGERY

## 2022-09-26 PROCEDURE — 3074F SYST BP LT 130 MM HG: CPT | Performed by: SURGERY

## 2022-09-26 PROCEDURE — 99213 OFFICE O/P EST LOW 20 MIN: CPT | Performed by: SURGERY

## 2022-09-26 RX ORDER — RIBOFLAVIN (VITAMIN B2) 100 MG
100 TABLET ORAL DAILY
COMMUNITY

## 2022-09-26 RX ORDER — ALPRAZOLAM 0.5 MG/1
0.5 TABLET ORAL
COMMUNITY
Start: 2022-08-31

## 2022-10-12 ENCOUNTER — OFFICE VISIT (OUTPATIENT)
Dept: SURGERY | Facility: CLINIC | Age: 52
End: 2022-10-12
Payer: COMMERCIAL

## 2022-10-12 DIAGNOSIS — Z90.13 ACQUIRED ABSENCE OF BILATERAL BREASTS AND NIPPLES: Primary | ICD-10-CM

## 2022-10-12 PROCEDURE — 99213 OFFICE O/P EST LOW 20 MIN: CPT | Performed by: SURGERY

## 2022-10-12 NOTE — PATIENT INSTRUCTIONS
Surgeon:              Dr. Ashia Jenkins. Deniz Darnell, PhD                                          Tel:         [phone]                                  Fax:        [phone]    Surgery/Procedure:      Bilateral breast implant exchange, right breast capsulotomy, left breast capsulorrhaphy  2.5 hours, general anesthesia, outpatient, waitlist                                     Hospital:  [facility]: [address] [place] [address] Blvd, [place], [address]           [phone]  [facility]: [address], [address], [place]               [phone]    1. Someone will need to drive you to and from the hospital if your procedure is outpatient. 2.Do not drink alcohol or smoke 24 hours prior to your procedure. 3. Bring a picture ID and your insurance card. 4. You will be contacted by the hospital the day before to confirm the procedure time and location. 5. The hospital will also contact you approximately one week before surgery to schedule your COVID test.     6. Do not take any herbal supplements or blood thinners at least one week before your procedure/surgery. This includes NSAID's (aspirin, baby aspirin, Motrin, Ibuprofen, Aleve, Advil, Naproxen, etc), Plavix, fish oil, vitamin E, turmeric, CoQ10, or green tea supplements, etc. *TYLENOL or acetaminophen is ok to take*    7. PRE-OPERATIVE TESTING: History and physical with medical clearance is REQUIRED within 30 days of the surgery date and is mandatory per Dr. Deniz Darnell. *If this is not done, your surgery will be postponed*  MEDICAL CLEARANCE WITH DR. Bajwa  CBC  CMP  EKG  Breast MRI    8. Please inform us if you develop any Covid-19 like symptoms, test positive or have been exposed for Covid- 19 prior to surgery.      Consent obtained for capsulotomy with breast implant replacement and patient decision checklist  Photos taken on 10/12/2022

## 2022-10-20 ENCOUNTER — TELEPHONE (OUTPATIENT)
Dept: SURGERY | Facility: CLINIC | Age: 52
End: 2022-10-20

## 2022-10-20 DIAGNOSIS — Z90.13 ACQUIRED ABSENCE OF BILATERAL BREASTS AND NIPPLES: Primary | ICD-10-CM

## 2022-10-20 DIAGNOSIS — C50.911 RECURRENT BREAST CANCER, RIGHT (HCC): ICD-10-CM

## 2022-10-20 DIAGNOSIS — C50.811 MALIGNANT NEOPLASM OF OVERLAPPING SITES OF RIGHT BREAST IN FEMALE, ESTROGEN RECEPTOR POSITIVE (HCC): ICD-10-CM

## 2022-10-20 DIAGNOSIS — Z17.0 MALIGNANT NEOPLASM OF OVERLAPPING SITES OF RIGHT BREAST IN FEMALE, ESTROGEN RECEPTOR POSITIVE (HCC): ICD-10-CM

## 2022-10-20 NOTE — TELEPHONE ENCOUNTER
Call made to Radiology department-MRI. I was informed we can place one order to evaluate soft tissue as well as implant integrity. Order placed. Attempted to inform patient and LVM to call us back.

## 2022-10-21 ENCOUNTER — TELEPHONE (OUTPATIENT)
Dept: SURGERY | Facility: CLINIC | Age: 52
End: 2022-10-21

## 2022-10-24 ENCOUNTER — TELEPHONE (OUTPATIENT)
Dept: SURGERY | Facility: CLINIC | Age: 52
End: 2022-10-24

## 2022-10-24 ENCOUNTER — OFFICE VISIT (OUTPATIENT)
Dept: HEMATOLOGY/ONCOLOGY | Facility: HOSPITAL | Age: 52
End: 2022-10-24
Attending: SPECIALIST
Payer: COMMERCIAL

## 2022-10-24 DIAGNOSIS — Z90.13 ACQUIRED ABSENCE OF BILATERAL BREASTS AND NIPPLES: Primary | ICD-10-CM

## 2022-10-24 NOTE — TELEPHONE ENCOUNTER
Patient returning voicemail to discuss surgery scheduling. Offered patient 02/21/2023 at [facility] with Dr. Kirit Alejandra, with the option to be placed on a waitlist if earlier availability opens. Patient agreed, asked for waitlist for either/both locations.

## 2022-11-07 ENCOUNTER — OFFICE VISIT (OUTPATIENT)
Dept: HEMATOLOGY/ONCOLOGY | Facility: HOSPITAL | Age: 52
End: 2022-11-07
Attending: SPECIALIST
Payer: COMMERCIAL

## 2022-11-07 VITALS
HEART RATE: 75 BPM | TEMPERATURE: 98 F | SYSTOLIC BLOOD PRESSURE: 130 MMHG | DIASTOLIC BLOOD PRESSURE: 77 MMHG | WEIGHT: 133 LBS | BODY MASS INDEX: 20.39 KG/M2 | OXYGEN SATURATION: 100 % | HEIGHT: 67.76 IN | RESPIRATION RATE: 16 BRPM

## 2022-11-07 DIAGNOSIS — F33.41 RECURRENT MAJOR DEPRESSIVE DISORDER, IN PARTIAL REMISSION (HCC): ICD-10-CM

## 2022-11-07 DIAGNOSIS — C50.811 MALIGNANT NEOPLASM OF OVERLAPPING SITES OF RIGHT BREAST IN FEMALE, ESTROGEN RECEPTOR POSITIVE (HCC): Primary | ICD-10-CM

## 2022-11-07 DIAGNOSIS — C50.911 RECURRENT MALIGNANT NEOPLASM OF RIGHT BREAST (HCC): ICD-10-CM

## 2022-11-07 DIAGNOSIS — Z17.0 MALIGNANT NEOPLASM OF OVERLAPPING SITES OF RIGHT BREAST IN FEMALE, ESTROGEN RECEPTOR POSITIVE (HCC): Primary | ICD-10-CM

## 2022-11-07 PROCEDURE — 99214 OFFICE O/P EST MOD 30 MIN: CPT | Performed by: SPECIALIST

## 2022-11-09 ENCOUNTER — TELEPHONE (OUTPATIENT)
Dept: INTERNAL MEDICINE CLINIC | Facility: CLINIC | Age: 52
End: 2022-11-09

## 2022-11-09 NOTE — PROGRESS NOTES
Patient is overdue for CPX.  Please ask her to see me for her CPX and then I can addend my note within 30 days of her surgery in February

## 2022-11-14 NOTE — TELEPHONE ENCOUNTER
KS - if you would like to see pt sooner, please advise if ok to use [phone] slot (lunch or SDA block) as pt has flexibility then. [phone] spoke to pt, scheduled cpx for     Future Appointments   Date Time Provider Princess Mendez   12/27/2022  8:30 AM Christina Gar MD [facility] [place]   2/9/2023  1:00 PM Elisha Bentley MD [facility] HEM [address]   10/3/2023  8:00 AM Nitish Gallo MD [facility]G Surg/Onc     Pt stated she could come in between [phone] during her lunch break, if that would work better for Dr. Jaclyn Lopez. Otherwise, pt needs early in the morning appointment.

## 2022-11-27 ENCOUNTER — OFFICE VISIT (OUTPATIENT)
Dept: FAMILY MEDICINE CLINIC | Facility: CLINIC | Age: 52
End: 2022-11-27
Payer: COMMERCIAL

## 2022-11-27 VITALS
DIASTOLIC BLOOD PRESSURE: 78 MMHG | RESPIRATION RATE: 18 BRPM | BODY MASS INDEX: 19.7 KG/M2 | TEMPERATURE: 97 F | SYSTOLIC BLOOD PRESSURE: 119 MMHG | OXYGEN SATURATION: 97 % | WEIGHT: 130 LBS | HEART RATE: 78 BPM | HEIGHT: 68 IN

## 2022-11-27 DIAGNOSIS — J01.00 ACUTE NON-RECURRENT MAXILLARY SINUSITIS: ICD-10-CM

## 2022-11-27 DIAGNOSIS — J02.9 SORE THROAT: Primary | ICD-10-CM

## 2022-11-27 LAB
CONTROL LINE PRESENT WITH A CLEAR BACKGROUND (YES/NO): YES YES/NO
KIT LOT #: NORMAL NUMERIC
STREP GRP A CUL-SCR: NEGATIVE

## 2022-11-27 PROCEDURE — 99213 OFFICE O/P EST LOW 20 MIN: CPT | Performed by: NURSE PRACTITIONER

## 2022-11-27 PROCEDURE — 87880 STREP A ASSAY W/OPTIC: CPT | Performed by: NURSE PRACTITIONER

## 2022-11-27 PROCEDURE — 3008F BODY MASS INDEX DOCD: CPT | Performed by: NURSE PRACTITIONER

## 2022-11-27 PROCEDURE — 3078F DIAST BP <80 MM HG: CPT | Performed by: NURSE PRACTITIONER

## 2022-11-27 PROCEDURE — 3074F SYST BP LT 130 MM HG: CPT | Performed by: NURSE PRACTITIONER

## 2022-11-27 RX ORDER — AMOXICILLIN AND CLAVULANATE POTASSIUM 875; 125 MG/1; MG/1
1 TABLET, FILM COATED ORAL 2 TIMES DAILY
Qty: 14 TABLET | Refills: 0 | Status: SHIPPED | OUTPATIENT
Start: 2022-11-27 | End: 2022-12-04

## 2022-12-02 NOTE — PROGRESS NOTES
Patient phoned back and patient stated she will keep her appointment for Wednesday June 30, 2021 at 1400 in [place] with Dr. Yonny Cid.  Pt was provided with breast nurse navigator contact information and was encouraged to phone with any other questions or Pt c/o body aches, fever, sore throat, ear pain. States body aches began last weekend.

## 2023-01-04 ENCOUNTER — OFFICE VISIT (OUTPATIENT)
Dept: INTERNAL MEDICINE CLINIC | Facility: CLINIC | Age: 53
End: 2023-01-04
Payer: COMMERCIAL

## 2023-01-04 VITALS
SYSTOLIC BLOOD PRESSURE: 100 MMHG | HEIGHT: 67.5 IN | OXYGEN SATURATION: 99 % | BODY MASS INDEX: 21.41 KG/M2 | RESPIRATION RATE: 16 BRPM | DIASTOLIC BLOOD PRESSURE: 60 MMHG | TEMPERATURE: 98 F | WEIGHT: 138 LBS | HEART RATE: 74 BPM

## 2023-01-04 DIAGNOSIS — C50.811 MALIGNANT NEOPLASM OF OVERLAPPING SITES OF RIGHT BREAST IN FEMALE, ESTROGEN RECEPTOR NEGATIVE (HCC): ICD-10-CM

## 2023-01-04 DIAGNOSIS — Z00.00 ROUTINE GENERAL MEDICAL EXAMINATION AT A HEALTH CARE FACILITY: Primary | ICD-10-CM

## 2023-01-04 DIAGNOSIS — Z17.1 MALIGNANT NEOPLASM OF OVERLAPPING SITES OF RIGHT BREAST IN FEMALE, ESTROGEN RECEPTOR NEGATIVE (HCC): ICD-10-CM

## 2023-01-04 DIAGNOSIS — F33.1 MAJOR DEPRESSIVE DISORDER, RECURRENT EPISODE, MODERATE (HCC): ICD-10-CM

## 2023-01-04 DIAGNOSIS — Z90.13 ACQUIRED ABSENCE OF BILATERAL BREASTS AND NIPPLES: ICD-10-CM

## 2023-01-04 DIAGNOSIS — F41.1 GAD (GENERALIZED ANXIETY DISORDER): ICD-10-CM

## 2023-01-04 PROBLEM — F31.81 BIPOLAR II DISORDER (HCC): Status: RESOLVED | Noted: 2021-01-04 | Resolved: 2023-01-04

## 2023-01-04 PROBLEM — Z12.11 SPECIAL SCREENING FOR MALIGNANT NEOPLASM OF COLON: Status: RESOLVED | Noted: 2021-11-30 | Resolved: 2023-01-04

## 2023-01-04 PROBLEM — G89.29 CHRONIC MIDLINE LOW BACK PAIN WITHOUT SCIATICA: Status: RESOLVED | Noted: 2019-04-23 | Resolved: 2023-01-04

## 2023-01-04 PROBLEM — F06.30 MOOD DISORDER IN CONDITIONS CLASSIFIED ELSEWHERE: Status: RESOLVED | Noted: 2019-04-23 | Resolved: 2023-01-04

## 2023-01-04 PROBLEM — E78.2 MIXED HYPERLIPIDEMIA: Status: RESOLVED | Noted: 2019-04-23 | Resolved: 2023-01-04

## 2023-01-04 PROBLEM — M54.50 CHRONIC MIDLINE LOW BACK PAIN WITHOUT SCIATICA: Status: RESOLVED | Noted: 2019-04-23 | Resolved: 2023-01-04

## 2023-01-04 PROBLEM — F12.90 MARIJUANA USE, EPISODIC: Status: RESOLVED | Noted: 2019-04-24 | Resolved: 2023-01-04

## 2023-01-04 PROBLEM — K43.9 VENTRAL HERNIA WITHOUT OBSTRUCTION OR GANGRENE: Status: RESOLVED | Noted: 2019-10-01 | Resolved: 2023-01-04

## 2023-01-04 PROBLEM — R79.89 HIGH SERUM LOW-DENSITY LIPOPROTEIN (LDL): Status: RESOLVED | Noted: 2019-04-23 | Resolved: 2023-01-04

## 2023-01-04 LAB
ATRIAL RATE: 65 BPM
P AXIS: 14 DEGREES
P-R INTERVAL: 158 MS
Q-T INTERVAL: 398 MS
QRS DURATION: 82 MS
QTC CALCULATION (BEZET): 413 MS
R AXIS: 48 DEGREES
T AXIS: 56 DEGREES
VENTRICULAR RATE: 65 BPM

## 2023-01-04 PROCEDURE — 3078F DIAST BP <80 MM HG: CPT | Performed by: INTERNAL MEDICINE

## 2023-01-04 PROCEDURE — 93000 ELECTROCARDIOGRAM COMPLETE: CPT | Performed by: INTERNAL MEDICINE

## 2023-01-04 PROCEDURE — 3008F BODY MASS INDEX DOCD: CPT | Performed by: INTERNAL MEDICINE

## 2023-01-04 PROCEDURE — 3074F SYST BP LT 130 MM HG: CPT | Performed by: INTERNAL MEDICINE

## 2023-01-04 PROCEDURE — 99396 PREV VISIT EST AGE 40-64: CPT | Performed by: INTERNAL MEDICINE

## 2023-01-04 NOTE — PATIENT INSTRUCTIONS
There are in fair range ( TG slightly high) No need to repeat labs in April. You are due for you second/final shingrix vaccine after 2/9/2023. Please complete your fasting labs within 30 days of your surgery.

## 2023-01-17 ENCOUNTER — HOSPITAL ENCOUNTER (OUTPATIENT)
Dept: MRI IMAGING | Facility: HOSPITAL | Age: 53
Discharge: HOME | End: 2023-01-17
Attending: SURGERY
Payer: COMMERCIAL

## 2023-01-17 DIAGNOSIS — C50.811 MALIGNANT NEOPLASM OF OVERLAPPING SITES OF RIGHT BREAST IN FEMALE, ESTROGEN RECEPTOR POSITIVE (HCC): ICD-10-CM

## 2023-01-17 DIAGNOSIS — Z17.0 MALIGNANT NEOPLASM OF OVERLAPPING SITES OF RIGHT BREAST IN FEMALE, ESTROGEN RECEPTOR POSITIVE (HCC): ICD-10-CM

## 2023-01-17 DIAGNOSIS — C50.911 RECURRENT BREAST CANCER, RIGHT (HCC): ICD-10-CM

## 2023-01-17 DIAGNOSIS — Z90.13 ACQUIRED ABSENCE OF BILATERAL BREASTS AND NIPPLES: ICD-10-CM

## 2023-01-17 PROCEDURE — A9575 INJ GADOTERATE MEGLUMI 0.1ML: HCPCS | Performed by: SURGERY

## 2023-01-17 PROCEDURE — 77049 MRI BREAST C-+ W/CAD BI: CPT | Performed by: SURGERY

## 2023-01-17 RX ORDER — GADOTERATE MEGLUMINE 376.9 MG/ML
15 INJECTION INTRAVENOUS
Status: COMPLETED | OUTPATIENT
Start: 2023-01-17 | End: 2023-01-17

## 2023-01-17 RX ADMIN — GADOTERATE MEGLUMINE 12 ML: 376.9 INJECTION INTRAVENOUS at 19:49:00

## 2023-01-31 ENCOUNTER — TELEPHONE (OUTPATIENT)
Dept: SURGERY | Facility: CLINIC | Age: 53
End: 2023-01-31

## 2023-01-31 DIAGNOSIS — Z90.13 ACQUIRED ABSENCE OF BILATERAL BREASTS AND NIPPLES: Primary | ICD-10-CM

## 2023-01-31 NOTE — TELEPHONE ENCOUNTER
Spoke with patient regarding her recent breast MRI. Informed patient that her implants show no evidence of rupture and we will have Dr. Aparicio Resides team review report once addended.

## 2023-01-31 NOTE — TELEPHONE ENCOUNTER
Calling patient to offer rescheduling her surgery from 02/21/2023 to 02/09/2023, remaining at the [facility] with Dr. Debora Green, due to a change in her schedule. Patient agreed.

## 2023-02-01 ENCOUNTER — TELEPHONE (OUTPATIENT)
Dept: SURGERY | Facility: CLINIC | Age: 53
End: 2023-02-01

## 2023-02-01 ENCOUNTER — TELEPHONE (OUTPATIENT)
Dept: INTERNAL MEDICINE CLINIC | Facility: CLINIC | Age: 53
End: 2023-02-01

## 2023-02-01 NOTE — TELEPHONE ENCOUNTER
Pt says she spoke to Dr. Pricila Cline and was informed that her most recent physical she had done on 1/4/23 could be used for her pre-op requirements so that pt doesn't have to come in for an ofv, f/u with pt to discuss furthermore. Surgery date: 2/9/23 for Implant replacement.    Sunny Davis   Surgeon: Tu Hawk

## 2023-02-01 NOTE — TELEPHONE ENCOUNTER
Calling patient to reschedule her surgery. I explained to the patient that we need to move her surgery back to its original date, if the she is still able to make that day work. Patient agreed to surgery on 02/21/2023, her original surgery date, at the [facility] with Dr. Karlo Dugan. I apologized for the headache and confusion and thanked her for her understanding.

## 2023-02-01 NOTE — TELEPHONE ENCOUNTER
Please advise.      Last cpx: 1/4/23    Instructions       Return in about 1 year (around 1/4/2024) for physical exam . normal...

## 2023-02-01 NOTE — TELEPHONE ENCOUNTER
Patient does not need to return for an office visit but needs to complete fasting labs asap for her pre-op.

## 2023-02-02 DIAGNOSIS — Z90.13 ACQUIRED ABSENCE OF BILATERAL BREASTS AND NIPPLES: Primary | ICD-10-CM

## 2023-02-06 ENCOUNTER — LAB ENCOUNTER (OUTPATIENT)
Dept: LAB | Facility: HOSPITAL | Age: 53
End: 2023-02-06
Attending: INTERNAL MEDICINE
Payer: COMMERCIAL

## 2023-02-06 DIAGNOSIS — Z00.00 ROUTINE GENERAL MEDICAL EXAMINATION AT A HEALTH CARE FACILITY: ICD-10-CM

## 2023-02-06 LAB
ALBUMIN SERPL-MCNC: 3.9 G/DL (ref 3.4–5)
ALBUMIN/GLOB SERPL: 1.2 {RATIO} (ref 1–2)
ALP LIVER SERPL-CCNC: 26 U/L
ALT SERPL-CCNC: 22 U/L
ANION GAP SERPL CALC-SCNC: 0 MMOL/L (ref 0–18)
AST SERPL-CCNC: 17 U/L (ref 15–37)
BASOPHILS # BLD AUTO: 0.05 X10(3) UL (ref 0–0.2)
BASOPHILS NFR BLD AUTO: 1.4 %
BILIRUB SERPL-MCNC: 0.9 MG/DL (ref 0.1–2)
BUN BLD-MCNC: 17 MG/DL (ref 7–18)
CALCIUM BLD-MCNC: 9.3 MG/DL (ref 8.5–10.1)
CHLORIDE SERPL-SCNC: 107 MMOL/L (ref 98–112)
CHOLEST SERPL-MCNC: 251 MG/DL (ref ?–200)
CO2 SERPL-SCNC: 33 MMOL/L (ref 21–32)
CREAT BLD-MCNC: 0.67 MG/DL
EOSINOPHIL # BLD AUTO: 0.22 X10(3) UL (ref 0–0.7)
EOSINOPHIL NFR BLD AUTO: 6.1 %
ERYTHROCYTE [DISTWIDTH] IN BLOOD BY AUTOMATED COUNT: 12.7 %
FASTING PATIENT LIPID ANSWER: YES
FASTING STATUS PATIENT QL REPORTED: YES
GFR SERPLBLD BASED ON 1.73 SQ M-ARVRAT: 106 ML/MIN/1.73M2 (ref 60–?)
GLOBULIN PLAS-MCNC: 3.3 G/DL (ref 2.8–4.4)
GLUCOSE BLD-MCNC: 97 MG/DL (ref 70–99)
HCT VFR BLD AUTO: 40.1 %
HDLC SERPL-MCNC: 84 MG/DL (ref 40–59)
HGB BLD-MCNC: 13.3 G/DL
IMM GRANULOCYTES # BLD AUTO: 0.01 X10(3) UL (ref 0–1)
IMM GRANULOCYTES NFR BLD: 0.3 %
LDLC SERPL CALC-MCNC: 156 MG/DL (ref ?–100)
LYMPHOCYTES # BLD AUTO: 1.4 X10(3) UL (ref 1–4)
LYMPHOCYTES NFR BLD AUTO: 39 %
MCH RBC QN AUTO: 32.2 PG (ref 26–34)
MCHC RBC AUTO-ENTMCNC: 33.2 G/DL (ref 31–37)
MCV RBC AUTO: 97.1 FL
MONOCYTES # BLD AUTO: 0.38 X10(3) UL (ref 0.1–1)
MONOCYTES NFR BLD AUTO: 10.6 %
NEUTROPHILS # BLD AUTO: 1.53 X10 (3) UL (ref 1.5–7.7)
NEUTROPHILS # BLD AUTO: 1.53 X10(3) UL (ref 1.5–7.7)
NEUTROPHILS NFR BLD AUTO: 42.6 %
NONHDLC SERPL-MCNC: 167 MG/DL (ref ?–130)
OSMOLALITY SERPL CALC.SUM OF ELEC: 291 MOSM/KG (ref 275–295)
PLATELET # BLD AUTO: 197 10(3)UL (ref 150–450)
POTASSIUM SERPL-SCNC: 4.1 MMOL/L (ref 3.5–5.1)
PROT SERPL-MCNC: 7.2 G/DL (ref 6.4–8.2)
RBC # BLD AUTO: 4.13 X10(6)UL
SODIUM SERPL-SCNC: 140 MMOL/L (ref 136–145)
TRIGL SERPL-MCNC: 67 MG/DL (ref 30–149)
VLDLC SERPL CALC-MCNC: 13 MG/DL (ref 0–30)
WBC # BLD AUTO: 3.6 X10(3) UL (ref 4–11)

## 2023-02-06 PROCEDURE — 80053 COMPREHEN METABOLIC PANEL: CPT

## 2023-02-06 PROCEDURE — 36415 COLL VENOUS BLD VENIPUNCTURE: CPT

## 2023-02-06 PROCEDURE — 85025 COMPLETE CBC W/AUTO DIFF WBC: CPT

## 2023-02-06 PROCEDURE — 80061 LIPID PANEL: CPT

## 2023-02-09 ENCOUNTER — APPOINTMENT (OUTPATIENT)
Dept: HEMATOLOGY/ONCOLOGY | Facility: HOSPITAL | Age: 53
End: 2023-02-09
Attending: SPECIALIST
Payer: COMMERCIAL

## 2023-02-14 RX ORDER — THIAMINE HCL 100 MG
TABLET ORAL DAILY
COMMUNITY

## 2023-02-20 ENCOUNTER — ANESTHESIA EVENT (OUTPATIENT)
Dept: SURGERY | Facility: HOSPITAL | Age: 53
End: 2023-02-20
Payer: COMMERCIAL

## 2023-02-21 ENCOUNTER — ANESTHESIA (OUTPATIENT)
Dept: SURGERY | Facility: HOSPITAL | Age: 53
End: 2023-02-21
Payer: COMMERCIAL

## 2023-02-21 ENCOUNTER — HOSPITAL ENCOUNTER (OUTPATIENT)
Facility: HOSPITAL | Age: 53
Setting detail: HOSPITAL OUTPATIENT SURGERY
Discharge: HOME | End: 2023-02-21
Attending: SURGERY | Admitting: SURGERY
Payer: COMMERCIAL

## 2023-02-21 VITALS
TEMPERATURE: 99 F | WEIGHT: 136.81 LBS | SYSTOLIC BLOOD PRESSURE: 100 MMHG | OXYGEN SATURATION: 100 % | BODY MASS INDEX: 20.74 KG/M2 | HEART RATE: 60 BPM | DIASTOLIC BLOOD PRESSURE: 70 MMHG | HEIGHT: 68 IN | RESPIRATION RATE: 16 BRPM

## 2023-02-21 DIAGNOSIS — Z20.822 ENCOUNTER FOR PREOPERATIVE SCREENING LABORATORY TESTING FOR COVID-19 VIRUS: Primary | ICD-10-CM

## 2023-02-21 DIAGNOSIS — Z01.812 ENCOUNTER FOR PREOPERATIVE SCREENING LABORATORY TESTING FOR COVID-19 VIRUS: Primary | ICD-10-CM

## 2023-02-21 DIAGNOSIS — Z17.1 MALIGNANT NEOPLASM OF OVERLAPPING SITES OF RIGHT BREAST IN FEMALE, ESTROGEN RECEPTOR NEGATIVE (HCC): ICD-10-CM

## 2023-02-21 DIAGNOSIS — Z90.13 ACQUIRED ABSENCE OF BILATERAL BREASTS AND NIPPLES: ICD-10-CM

## 2023-02-21 DIAGNOSIS — C50.811 MALIGNANT NEOPLASM OF OVERLAPPING SITES OF RIGHT BREAST IN FEMALE, ESTROGEN RECEPTOR NEGATIVE (HCC): ICD-10-CM

## 2023-02-21 LAB
B-HCG UR QL: NEGATIVE
SARS-COV-2 RNA RESP QL NAA+PROBE: NOT DETECTED

## 2023-02-21 PROCEDURE — 0HRV0JZ REPLACEMENT OF BILATERAL BREAST WITH SYNTHETIC SUBSTITUTE, OPEN APPROACH: ICD-10-PCS | Performed by: SURGERY

## 2023-02-21 PROCEDURE — 88305 TISSUE EXAM BY PATHOLOGIST: CPT | Performed by: SURGERY

## 2023-02-21 PROCEDURE — 81025 URINE PREGNANCY TEST: CPT

## 2023-02-21 PROCEDURE — 0HPT0JZ REMOVAL OF SYNTHETIC SUBSTITUTE FROM RIGHT BREAST, OPEN APPROACH: ICD-10-PCS | Performed by: SURGERY

## 2023-02-21 PROCEDURE — 0HPU0JZ REMOVAL OF SYNTHETIC SUBSTITUTE FROM LEFT BREAST, OPEN APPROACH: ICD-10-PCS | Performed by: SURGERY

## 2023-02-21 DEVICE — NATRELLE INSPIRA SSX 545CC EXTRA FULL PROFILE SMOOTH ROUND SILICONE
Type: IMPLANTABLE DEVICE | Site: BREAST | Status: FUNCTIONAL
Brand: NATRELLE INSPIRA SOFTTOUCH BREAST IMPLANTS

## 2023-02-21 RX ORDER — DEXAMETHASONE SODIUM PHOSPHATE 4 MG/ML
VIAL (ML) INJECTION AS NEEDED
Status: DISCONTINUED | OUTPATIENT
Start: 2023-02-21 | End: 2023-02-21 | Stop reason: SURG

## 2023-02-21 RX ORDER — MIDAZOLAM HYDROCHLORIDE 1 MG/ML
1 INJECTION INTRAMUSCULAR; INTRAVENOUS EVERY 5 MIN PRN
Status: DISCONTINUED | OUTPATIENT
Start: 2023-02-21 | End: 2023-02-21

## 2023-02-21 RX ORDER — MIRTAZAPINE 7.5 MG/1
7.5 TABLET, FILM COATED ORAL NIGHTLY
COMMUNITY
Start: 2023-01-17

## 2023-02-21 RX ORDER — NEOSTIGMINE METHYLSULFATE 1 MG/ML
INJECTION, SOLUTION INTRAVENOUS AS NEEDED
Status: DISCONTINUED | OUTPATIENT
Start: 2023-02-21 | End: 2023-02-21 | Stop reason: SURG

## 2023-02-21 RX ORDER — ONDANSETRON 4 MG/1
4 TABLET, FILM COATED ORAL EVERY 8 HOURS PRN
Qty: 20 TABLET | Refills: 1 | Status: SHIPPED | OUTPATIENT
Start: 2023-02-21

## 2023-02-21 RX ORDER — OXYCODONE HYDROCHLORIDE 5 MG/1
15 TABLET ORAL EVERY 4 HOURS PRN
Status: DISCONTINUED | OUTPATIENT
Start: 2023-02-21 | End: 2023-02-21

## 2023-02-21 RX ORDER — ACETAMINOPHEN 10 MG/ML
INJECTION, SOLUTION INTRAVENOUS AS NEEDED
Status: DISCONTINUED | OUTPATIENT
Start: 2023-02-21 | End: 2023-02-21 | Stop reason: SURG

## 2023-02-21 RX ORDER — IBUPROFEN 400 MG/1
400 TABLET ORAL EVERY 6 HOURS PRN
COMMUNITY
End: 2023-02-21

## 2023-02-21 RX ORDER — HYDROMORPHONE HYDROCHLORIDE 1 MG/ML
0.6 INJECTION, SOLUTION INTRAMUSCULAR; INTRAVENOUS; SUBCUTANEOUS EVERY 5 MIN PRN
Status: DISCONTINUED | OUTPATIENT
Start: 2023-02-21 | End: 2023-02-21

## 2023-02-21 RX ORDER — GLYCOPYRROLATE 0.2 MG/ML
INJECTION, SOLUTION INTRAMUSCULAR; INTRAVENOUS AS NEEDED
Status: DISCONTINUED | OUTPATIENT
Start: 2023-02-21 | End: 2023-02-21 | Stop reason: SURG

## 2023-02-21 RX ORDER — ACETAMINOPHEN 500 MG
1000 TABLET ORAL ONCE
Status: DISCONTINUED | OUTPATIENT
Start: 2023-02-21 | End: 2023-02-21 | Stop reason: HOSPADM

## 2023-02-21 RX ORDER — KETAMINE HYDROCHLORIDE 50 MG/ML
INJECTION, SOLUTION, CONCENTRATE INTRAMUSCULAR; INTRAVENOUS AS NEEDED
Status: DISCONTINUED | OUTPATIENT
Start: 2023-02-21 | End: 2023-02-21 | Stop reason: SURG

## 2023-02-21 RX ORDER — CEPHALEXIN 500 MG/1
500 CAPSULE ORAL 4 TIMES DAILY
Qty: 28 CAPSULE | Refills: 0 | Status: SHIPPED | OUTPATIENT
Start: 2023-02-21 | End: 2023-02-28

## 2023-02-21 RX ORDER — HYDROCODONE BITARTRATE AND ACETAMINOPHEN 5; 325 MG/1; MG/1
1-2 TABLET ORAL EVERY 6 HOURS PRN
Qty: 40 TABLET | Refills: 0 | Status: SHIPPED | OUTPATIENT
Start: 2023-02-21

## 2023-02-21 RX ORDER — ROCURONIUM BROMIDE 10 MG/ML
INJECTION, SOLUTION INTRAVENOUS AS NEEDED
Status: DISCONTINUED | OUTPATIENT
Start: 2023-02-21 | End: 2023-02-21 | Stop reason: SURG

## 2023-02-21 RX ORDER — DOCUSATE SODIUM 100 MG/1
100 CAPSULE, LIQUID FILLED ORAL 2 TIMES DAILY
Qty: 40 CAPSULE | Refills: 1 | Status: SHIPPED | OUTPATIENT
Start: 2023-02-21

## 2023-02-21 RX ORDER — SODIUM CHLORIDE, SODIUM LACTATE, POTASSIUM CHLORIDE, CALCIUM CHLORIDE 600; 310; 30; 20 MG/100ML; MG/100ML; MG/100ML; MG/100ML
INJECTION, SOLUTION INTRAVENOUS CONTINUOUS
Status: DISCONTINUED | OUTPATIENT
Start: 2023-02-21 | End: 2023-02-21

## 2023-02-21 RX ORDER — METOCLOPRAMIDE HYDROCHLORIDE 5 MG/ML
INJECTION INTRAMUSCULAR; INTRAVENOUS AS NEEDED
Status: DISCONTINUED | OUTPATIENT
Start: 2023-02-21 | End: 2023-02-21 | Stop reason: SURG

## 2023-02-21 RX ORDER — ONDANSETRON 2 MG/ML
INJECTION INTRAMUSCULAR; INTRAVENOUS AS NEEDED
Status: DISCONTINUED | OUTPATIENT
Start: 2023-02-21 | End: 2023-02-21 | Stop reason: SURG

## 2023-02-21 RX ORDER — SCOLOPAMINE TRANSDERMAL SYSTEM 1 MG/1
1 PATCH, EXTENDED RELEASE TRANSDERMAL ONCE
Status: DISCONTINUED | OUTPATIENT
Start: 2023-02-21 | End: 2023-02-21 | Stop reason: HOSPADM

## 2023-02-21 RX ORDER — CEFAZOLIN SODIUM/WATER 2 G/20 ML
2 SYRINGE (ML) INTRAVENOUS ONCE
Status: COMPLETED | OUTPATIENT
Start: 2023-02-21 | End: 2023-02-21

## 2023-02-21 RX ORDER — HYDROMORPHONE HYDROCHLORIDE 1 MG/ML
INJECTION, SOLUTION INTRAMUSCULAR; INTRAVENOUS; SUBCUTANEOUS
Status: COMPLETED
Start: 2023-02-21 | End: 2023-02-21

## 2023-02-21 RX ORDER — PROCHLORPERAZINE EDISYLATE 5 MG/ML
5 INJECTION INTRAMUSCULAR; INTRAVENOUS EVERY 8 HOURS PRN
Status: DISCONTINUED | OUTPATIENT
Start: 2023-02-21 | End: 2023-02-21

## 2023-02-21 RX ORDER — NALOXONE HYDROCHLORIDE 0.4 MG/ML
80 INJECTION, SOLUTION INTRAMUSCULAR; INTRAVENOUS; SUBCUTANEOUS AS NEEDED
Status: DISCONTINUED | OUTPATIENT
Start: 2023-02-21 | End: 2023-02-21

## 2023-02-21 RX ORDER — HYDROMORPHONE HYDROCHLORIDE 1 MG/ML
0.4 INJECTION, SOLUTION INTRAMUSCULAR; INTRAVENOUS; SUBCUTANEOUS EVERY 5 MIN PRN
Status: DISCONTINUED | OUTPATIENT
Start: 2023-02-21 | End: 2023-02-21

## 2023-02-21 RX ORDER — CEFAZOLIN SODIUM/WATER 2 G/20 ML
SYRINGE (ML) INTRAVENOUS
Status: DISCONTINUED
Start: 2023-02-21 | End: 2023-02-21

## 2023-02-21 RX ORDER — LIDOCAINE HYDROCHLORIDE 40 MG/ML
SOLUTION TOPICAL AS NEEDED
Status: DISCONTINUED | OUTPATIENT
Start: 2023-02-21 | End: 2023-02-21 | Stop reason: SURG

## 2023-02-21 RX ORDER — LIDOCAINE HYDROCHLORIDE 10 MG/ML
INJECTION, SOLUTION EPIDURAL; INFILTRATION; INTRACAUDAL; PERINEURAL AS NEEDED
Status: DISCONTINUED | OUTPATIENT
Start: 2023-02-21 | End: 2023-02-21 | Stop reason: SURG

## 2023-02-21 RX ORDER — MEPERIDINE HYDROCHLORIDE 25 MG/ML
12.5 INJECTION INTRAMUSCULAR; INTRAVENOUS; SUBCUTANEOUS AS NEEDED
Status: DISCONTINUED | OUTPATIENT
Start: 2023-02-21 | End: 2023-02-21

## 2023-02-21 RX ORDER — OXYCODONE HYDROCHLORIDE 5 MG/1
10 TABLET ORAL EVERY 4 HOURS PRN
Status: DISCONTINUED | OUTPATIENT
Start: 2023-02-21 | End: 2023-02-21

## 2023-02-21 RX ORDER — BUPIVACAINE HYDROCHLORIDE 5 MG/ML
INJECTION, SOLUTION EPIDURAL; INTRACAUDAL AS NEEDED
Status: DISCONTINUED | OUTPATIENT
Start: 2023-02-21 | End: 2023-02-21 | Stop reason: HOSPADM

## 2023-02-21 RX ORDER — HYDROMORPHONE HYDROCHLORIDE 1 MG/ML
0.2 INJECTION, SOLUTION INTRAMUSCULAR; INTRAVENOUS; SUBCUTANEOUS EVERY 5 MIN PRN
Status: DISCONTINUED | OUTPATIENT
Start: 2023-02-21 | End: 2023-02-21

## 2023-02-21 RX ORDER — OXYCODONE HYDROCHLORIDE 5 MG/1
5 TABLET ORAL EVERY 4 HOURS PRN
Status: DISCONTINUED | OUTPATIENT
Start: 2023-02-21 | End: 2023-02-21

## 2023-02-21 RX ORDER — ONDANSETRON 2 MG/ML
4 INJECTION INTRAMUSCULAR; INTRAVENOUS EVERY 6 HOURS PRN
Status: DISCONTINUED | OUTPATIENT
Start: 2023-02-21 | End: 2023-02-21

## 2023-02-21 RX ADMIN — SODIUM CHLORIDE, SODIUM LACTATE, POTASSIUM CHLORIDE, CALCIUM CHLORIDE: 600; 310; 30; 20 INJECTION, SOLUTION INTRAVENOUS at 07:07:00

## 2023-02-21 RX ADMIN — ACETAMINOPHEN 1000 MG: 10 INJECTION, SOLUTION INTRAVENOUS at 09:45:00

## 2023-02-21 RX ADMIN — DEXAMETHASONE SODIUM PHOSPHATE 8 MG: 4 MG/ML VIAL (ML) INJECTION at 07:46:00

## 2023-02-21 RX ADMIN — LIDOCAINE HYDROCHLORIDE 50 MG: 10 INJECTION, SOLUTION EPIDURAL; INFILTRATION; INTRACAUDAL; PERINEURAL at 07:43:00

## 2023-02-21 RX ADMIN — METOCLOPRAMIDE HYDROCHLORIDE 10 MG: 5 INJECTION INTRAMUSCULAR; INTRAVENOUS at 07:51:00

## 2023-02-21 RX ADMIN — GLYCOPYRROLATE 0.4 MG: 0.2 INJECTION, SOLUTION INTRAMUSCULAR; INTRAVENOUS at 09:50:00

## 2023-02-21 RX ADMIN — NEOSTIGMINE METHYLSULFATE 4 MG: 1 INJECTION, SOLUTION INTRAVENOUS at 09:50:00

## 2023-02-21 RX ADMIN — ROCURONIUM BROMIDE 50 MG: 10 INJECTION, SOLUTION INTRAVENOUS at 07:43:00

## 2023-02-21 RX ADMIN — ROCURONIUM BROMIDE 10 MG: 10 INJECTION, SOLUTION INTRAVENOUS at 09:05:00

## 2023-02-21 RX ADMIN — CEFAZOLIN SODIUM/WATER 2 G: 2 G/20 ML SYRINGE (ML) INTRAVENOUS at 07:51:00

## 2023-02-21 RX ADMIN — ONDANSETRON 4 MG: 2 INJECTION INTRAMUSCULAR; INTRAVENOUS at 09:45:00

## 2023-02-21 RX ADMIN — LIDOCAINE HYDROCHLORIDE 4 ML: 40 SOLUTION TOPICAL at 07:43:00

## 2023-02-21 RX ADMIN — KETAMINE HYDROCHLORIDE 15 MG: 50 INJECTION, SOLUTION, CONCENTRATE INTRAMUSCULAR; INTRAVENOUS at 07:47:00

## 2023-02-21 NOTE — BRIEF OP NOTE
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

## 2023-02-21 NOTE — ANESTHESIA POSTPROCEDURE EVALUATION
[address] [facility] Patient Status:  Hospital Outpatient Surgery   Age/Gender 47year old female MRN [id number]   [facility] SURGERY Attending Willem Garcia MD   Hosp Day # 0 PCP Mónica Julio MD       Anesthesia Post-op Note    Bilateral breast implant exchange, right breast capsulotomy, left breast capsulorrhaphy    Procedure Summary     Date: 02/21/23 Room / Location: [address] [place] [address] OR 06 / [facility] OR    Anesthesia Start: 7137 Anesthesia Stop: 2999    Procedure: Bilateral breast implant exchange, right breast capsulotomy, left breast capsulorrhaphy (Bilateral: Breast) Diagnosis:       Acquired absence of bilateral breasts and nipples      (Acquired absence of bilateral breasts and nipples [Z90.13])    Surgeons: Willem Garcia MD Anesthesiologist: Nidhi Negron MD    Anesthesia Type: general ASA Status: 2          Anesthesia Type: general    Vitals Value Taken Time   /69 02/21/23 1016   Temp 98.3 02/21/23 1016   Pulse 72 02/21/23 1016   Resp 15 02/21/23 1016   SpO2 98 02/21/23 1016       Patient Location: PACU    Anesthesia Type: general    Airway Patency: patent    Postop Pain Control: adequate    Mental Status: preanesthetic baseline    Nausea/Vomiting: none    Cardiopulmonary/Hydration status: stable euvolemic    Complications: no apparent anesthesia related complications    Postop vital signs: stable    Dental Exam: Unchanged from Preop    Patient to be discharged from PACU when criteria met.

## 2023-02-21 NOTE — DISCHARGE INSTRUCTIONS
Plastic Surgery Home Care Instructions      We hope you were pleased with your care at [facility].  We wish you the best outcome and overall experience with your operation. These instructions will help to minimize pain, optimize healing, and improve the likelihood of a successful result. What To Expect  There will be some spotting of the incision lines for the next few days. Your breasts will be swollen and might feel congested (similar to breast feeding) for the next 1-2 weeks  Temporary areas of numbness are typical in the early weeks following a breast procedure. Normalization of sensation can typically take up to several months following the operation. Bandages (Dressing)  Keep dressings clean and dry  Do not remove your bra unless for hygiene purposes - compression is key - especially at night. You can change to a supportive sports bra or camilsole if this provides good compression and you are more comfortable in that rather than the surgical bra. No underwire. Reinforce the dressing with insertion of additional padding as needed - this is not required - for your comfort only    Bathing/Showers  You can resume showers in 48 hours. Let the soap and water run over incision, do not scrub. Before shower, take out all dressings from bra. No baths, swimming, or hot tubs until you receive medical permission    Pain Medication: Norco  Take one or two tablets every six hours as needed for pain. Do not take narcotics, if you do not have pain. Keep stools soft. Take a stool softener (Metamucil, Milk of Magnesia, Colace). Eating fruit will also help to prevent constipation    Antibiotics: Keflex 4x/day for 7 days  Antibiotics will help minimize the risk of a wound infection  Fill the prescription as directed   Follow the instructions as written on the bottle's label  Call our office if you experience nausea, rash, or other symptoms which might be a possible side effect.     Over-The-Counter Medication  Non-prescription anti-inflammatory medications can also help to ease the pain. You can take Aleve or ibuprofen starting 48 hours after surgery  Take as directed on the bottle  Drink a full glass of water with the medication    Home Medication  Resume your home medications as instructed  Do not resume herbal medications for two weeks  Hold your vitamin E and omega 3 for 2 weeks    Diet  Resume your normal diet    Activity  No strenuous activity or heavy lifting (no lifting over 10 pounds)  No activities that involve your pectoralis muscles (no planks, push-ups, etc)  You can go up and down the stairs as tolerated. You cannot return to work, if your work requires strenuous activity. You cannot return to physical exercise, sports, or gym workouts until you are allowed to participate in strenuous activity. Driving  Do not drive, if you are taking pain medication. Return to Work or [employer] can return to work when you are not taking pain medication, if your work does not involve strenuous activity. Contact the office, if you need a medical note. Follow-up Appointment with Jaron Obando PA-C on 3/8/2023 and Dr. Mary Bunch on 3/22/2023  Our office will call you to set up a time    Questions or Concerns  Call Dr. Iveth Palafox office if you experience severe pain not controlled by pain medication, swelling, numbness, tingling, bleeding, fever, or other concerns. If she is not available, another physician will be able to address your concerns. Vicky Sales   Thank you for coming to [facility] for your operation. The nurses and the anesthesiologist try very hard to make sure you receive the best care possible. Your trust in them is greatly appreciated.     Thanks so much,  Dr. Liz Sen PA-C

## 2023-02-21 NOTE — ANESTHESIA PROCEDURE NOTES
Airway  Date/Time: 2/21/2023 7:44 AM  Urgency: elective      General Information and Staff    Patient location during procedure: OR  Anesthesiologist: Raghu Braswell MD  Resident/CRNA: Marilee Hugo CRNA  Performed: CRNA   Performed by: Marilee Hugo CRNA  Authorized by: Raghu Braswell MD      Indications and Patient Condition  Indications for airway management: anesthesia  Sedation level: deep  Preoxygenated: yes  Patient position: sniffing  Mask difficulty assessment: 1 - vent by mask    Final Airway Details  Final airway type: endotracheal airway      Successful airway: ETT  Cuffed: yes   Successful intubation technique: direct laryngoscopy  Facilitating devices/methods: intubating stylet  Endotracheal tube insertion site: oral  Blade: Wilma  Blade size: #3  ETT size (mm): 7.0    Cormack-Lehane Classification: grade I - full view of glottis  Placement verified by: chest auscultation and capnometry   Measured from: lips  Number of attempts at approach: 1  Number of other approaches attempted: 0

## 2023-02-22 NOTE — OPERATIVE REPORT
[facility]    PATIENT'S NAME: PETAR MORGAN   ATTENDING PHYSICIAN: Amanda Barnes MD   OPERATING PHYSICIAN: Amanda Barnes MD   PATIENT ACCOUNT#:   [id number]    LOCATION:  [facility] 23 EDWP 10  MEDICAL RECORD #:   [id number]       YOB: 1970  ADMISSION DATE:       02/21/2023      OPERATION DATE:  02/21/2023    OPERATIVE REPORT    PREOPERATIVE DIAGNOSIS:  Absence of both breast, right breast capsular contracture. POSTOPERATIVE DIAGNOSIS:  Absence of both breast, right breast capsular contracture. PROCEDURE:    1.   Bilateral breast implant removal, replacement with permanent implant Naoma Keens Inspira Allergen Soft Touch breast implant, silicone, style  mL bilaterally, serial number on the right [id number], serial number on the left [id number]). 2.   Capsulotomy and partial capsulectomy, right breast.  3.   Capsulorrhaphy, left breast.    ASSISTANT:  Rocío Oviedo PA-C. ANESTHESIA:  General endotracheal.    COMPLICATIONS:  None. BLOOD LOSS:  Minimal.     INDICATIONS:  This is a 71-year-old female with a history of breast cancer, who underwent bilateral mastectomy and implant reconstruction by another surgeon. She then underwent revision surgery with me and eventually developed recurrence on her right side, for which she underwent re-resection, as well as radiation therapy. She now presents for revision surgery, presenting with right breast capsular contracture and breast asymmetry. Patient was seen in the office preoperatively and we discussed the options. She would like to go slightly smaller and we discussed potential risks or rippling with smaller implants, as well as residual asymmetry. Potential risks, complications, benefits and alternatives were discussed.   Risks and complications including, but not limited to infection, bleeding, scarring, damage to underlying structures, hematoma, seroma, capsular contracture, ESTEPHANIE-ALCL, implant rupture, need for further surgery. Patient understands and wishes to proceed. Questions were answered. OPERATIVE TECHNIQUE:  Patient was identified in the preoperative area and informed consent was obtained. The site was marked. The patient was then brought back to the operating room and placed in the supine position. She was adequately padded. Sequential compression devices were applied. General endotracheal anesthesia was administered. Perioperative antibiotics were given. The entire chest and breast were prepped and draped in the usual sterile fashion. The procedure was started with excising the inframammary fold incision on both sides. This was sent to pathology. Then electrocautery was used to dissect down through subcutaneous tissue, down to the capsule. On both sides, the capsule was opened. On the right side, there was significant capsular contracture. The right side extensive capsulotomy had to be performed, including partial capsulectomy. Those pieces were sent to pathology. The pocket was thoroughly irrigated with antibiotic solution. After loosening up the capsule and removing the breast implant, several pledgets were placed. The 545 mL SSX sizer has the best shape and form, and on the left breast in a similar fashion, the breast implant was removed and the capsule was evaluated. Here, the capsule had stretched on the lateral aspect; therefore, a capsulorrhaphy was performed with 0 Vicryl sutures. On the medial aspect, the capsulotomy was performed to release the tightness medially. Again, the 545 mL extra high profile sizer had best shape and form. Then, the pockets for both sides thoroughly irrigated with antibiotic solution, hemostasis was assured, and then the permanent implant as listed above were placed on both sides. Then, the capsule was closed with 0 Vicryl sutures on both sides.   The inframammary skin incisions were closed with 3-0 Vicryl sutures in the deep dermis, followed by 4-0 Vicryl sutures in the skin. Exofin glue and Steri-Strips were applied. Fluffs and a bra were applied. Patient tolerated the procedure well and awoke from anesthesia without difficulty. All sponge and needle counts were correct at the end of the case. (Also Job [id number])    Dictated By Cherelle Pond.  Kayla Lan MD  d: 02/21/2023 17:01:49  t: 02/21/2023 18:08:23  Job [id number]  DPD/

## 2023-03-03 ENCOUNTER — OFFICE VISIT (OUTPATIENT)
Dept: SURGERY | Facility: CLINIC | Age: 53
End: 2023-03-03
Payer: COMMERCIAL

## 2023-03-03 DIAGNOSIS — Z90.13 ACQUIRED ABSENCE OF BILATERAL BREASTS AND NIPPLES: Primary | ICD-10-CM

## 2023-03-03 PROCEDURE — 99024 POSTOP FOLLOW-UP VISIT: CPT | Performed by: PHYSICIAN ASSISTANT

## 2023-03-03 NOTE — PROGRESS NOTES
Con Valencia is a 47year old female who presents today for a follow-up after bilateral breast implant removal, replacement with permanent implant (Natrelle Inspira Allergan soft touch breast implant silicone style  mL), capsulotomy and partial capsulotomy right breast, capsulorrhaphy left breast with Dr. Mary Bunch on 2/21/2023. She denies fever and chills. She denies nausea, vomiting, diarrhea or constipation. Her pain is controlled. Physical Exam     Breasts: Bilateral breast incisions clean, dry and intact without wound drainage or wound dehiscence. Bilateral breast skin is without erythema, ecchymosis, necrosis, skin breakdown. Left nipple viable. No evidence of hematoma or seroma. Good shape and symmetry. There were no vitals filed for this visit. Assessment and Plan     Con Valencia is doing well s/p bilateral breast implant removal, replacement with permanent implant (Natrelle Inspira Allergan soft touch breast implant silicone style  mL), capsulotomy and partial capsulotomy right breast, capsulorrhaphy left breast with Dr. Mary Bunch on 2/21/2023. New Steri-Strips were placed on the breast incisions. She will keep these in place for the next week. She can then begin scar management. We reviewed options for scar management including vitamin E oil, silicone products, scar massage and sun protection/sun block. She should continue with compression and activity restrictions. She will follow-up with Dr. Mary Bunch in 2 weeks for recheck. She was instructed to call with any questions or concerns. Questions were answered. Patient understands.      Antonia Soto  3/3/2023  2:57 PM

## 2023-03-22 ENCOUNTER — OFFICE VISIT (OUTPATIENT)
Dept: SURGERY | Facility: CLINIC | Age: 53
End: 2023-03-22
Payer: COMMERCIAL

## 2023-03-22 DIAGNOSIS — Z90.13 ACQUIRED ABSENCE OF BILATERAL BREASTS AND NIPPLES: Primary | ICD-10-CM

## 2023-03-22 PROCEDURE — 99024 POSTOP FOLLOW-UP VISIT: CPT | Performed by: SURGERY

## 2023-03-22 NOTE — PROGRESS NOTES
Ok Miller is a 47year old female who presents today for a follow-up after bilateral breast implant removal, replacement with permanent implant (Natrelle Inspira Allergan soft touch breast implant silicone style  mL), capsulotomy and partial capsulotomy right breast, capsulorrhaphy left breast on 2/21/2023. She denies fever and chills. She denies nausea, vomiting, diarrhea or constipation. Her pain is controlled. Physical Exam     Breasts: Bilateral breast incisions clean, dry and intact without wound drainage or wound dehiscence. Slightly thicker scar on the right compared to left. Slight soft tissue swelling right inferior lateral breast. Bilateral breast skin is without erythema, ecchymosis, necrosis, skin breakdown. Left nipple viable. No evidence of hematoma or seroma. Good shape and symmetry. There were no vitals filed for this visit. Assessment and Plan     Ok Miller is doing well s/p bilateral breast implant removal, replacement with permanent implant (Natrelle Inspira Allergan soft touch breast implant silicone style  mL), capsulotomy and partial capsulotomy right breast, capsulorrhaphy left breast  on 2/21/2023. She can continue scar management particularly in the right IMF incision. We reviewed options for scar management including vitamin E oil, silicone products, scar massage. She should continue to wear breast support at all times for 6 weeks post-operatively. I recommend she wear compression/support with strenuous activity moving forward and continue to avoid chest specific exercises. She can gradually resume activities with compression in place. She was instructed to call with any questions or concerns. She will follow up in 4-6 months. Questions were answered. Patient understands.

## 2023-04-03 ENCOUNTER — LAB ENCOUNTER (OUTPATIENT)
Dept: LAB | Facility: HOSPITAL | Age: 53
End: 2023-04-03
Attending: DERMATOLOGY
Payer: COMMERCIAL

## 2023-04-03 DIAGNOSIS — F42.4 SKIN PICKING HABIT: ICD-10-CM

## 2023-04-03 DIAGNOSIS — L60.3 NAIL ATROPHY: ICD-10-CM

## 2023-04-03 DIAGNOSIS — L73.8 FOLLICULITIS NARES PERFORANS: ICD-10-CM

## 2023-04-03 DIAGNOSIS — Z79.899 ENCOUNTER FOR LONG-TERM (CURRENT) DRUG USE: Primary | ICD-10-CM

## 2023-04-03 DIAGNOSIS — L65.9 ALOPECIA, UNSPECIFIED: ICD-10-CM

## 2023-04-03 LAB
ALBUMIN SERPL-MCNC: 4.1 G/DL (ref 3.4–5)
ALP LIVER SERPL-CCNC: 39 U/L
ALT SERPL-CCNC: 23 U/L
AST SERPL-CCNC: 17 U/L (ref 15–37)
BILIRUB DIRECT SERPL-MCNC: 0.3 MG/DL (ref 0–0.2)
BILIRUB SERPL-MCNC: 1.2 MG/DL (ref 0.1–2)
DEPRECATED HBV CORE AB SER IA-ACNC: 21 NG/ML
PROT SERPL-MCNC: 7.8 G/DL (ref 6.4–8.2)
T4 FREE SERPL-MCNC: 1 NG/DL (ref 0.8–1.7)
TSI SER-ACNC: 1.26 MIU/ML (ref 0.36–3.74)

## 2023-04-03 PROCEDURE — 36415 COLL VENOUS BLD VENIPUNCTURE: CPT

## 2023-04-03 PROCEDURE — 84402 ASSAY OF FREE TESTOSTERONE: CPT

## 2023-04-03 PROCEDURE — 84403 ASSAY OF TOTAL TESTOSTERONE: CPT

## 2023-04-03 PROCEDURE — 84439 ASSAY OF FREE THYROXINE: CPT

## 2023-04-03 PROCEDURE — 82728 ASSAY OF FERRITIN: CPT

## 2023-04-03 PROCEDURE — 80076 HEPATIC FUNCTION PANEL: CPT

## 2023-04-03 PROCEDURE — 84443 ASSAY THYROID STIM HORMONE: CPT

## 2023-04-07 LAB
SEX HORMONE BINDING GLOBULIN: 64 NMOL/L
TESTOSTERONE -MS, BIOAVAILAB: 9.1 NG/DL
TESTOSTERONE, -MS/MS: 28 NG/DL
TESTOSTERONE, FREE -MS/MS: 3 PG/ML

## 2023-04-17 ENCOUNTER — OFFICE VISIT (OUTPATIENT)
Facility: LOCATION | Age: 53
End: 2023-04-17
Payer: COMMERCIAL

## 2023-04-17 DIAGNOSIS — H93.19 TINNITUS, UNSPECIFIED LATERALITY: Primary | ICD-10-CM

## 2023-04-17 DIAGNOSIS — H93.12 TINNITUS OF LEFT EAR: Primary | ICD-10-CM

## 2023-04-17 DIAGNOSIS — H90.3 SENSORY HEARING LOSS, BILATERAL: ICD-10-CM

## 2023-04-17 PROCEDURE — 92567 TYMPANOMETRY: CPT | Performed by: AUDIOLOGIST

## 2023-04-17 PROCEDURE — 92557 COMPREHENSIVE HEARING TEST: CPT | Performed by: AUDIOLOGIST

## 2023-04-17 PROCEDURE — 99243 OFF/OP CNSLTJ NEW/EST LOW 30: CPT | Performed by: OTOLARYNGOLOGY

## 2023-04-17 NOTE — PROGRESS NOTES
Katarina Aragon was seen for an audiometric evaluation and tympanogram today. Referred back to physician. Hearing aid evaluation post medical clearance.     Jean-Claude Christy MS, CCC-A

## 2023-04-26 ENCOUNTER — OFFICE VISIT (OUTPATIENT)
Dept: SURGERY | Facility: CLINIC | Age: 53
End: 2023-04-26
Payer: COMMERCIAL

## 2023-04-26 DIAGNOSIS — Z90.13 ACQUIRED ABSENCE OF BILATERAL BREASTS AND NIPPLES: Primary | ICD-10-CM

## 2023-04-26 PROCEDURE — 99024 POSTOP FOLLOW-UP VISIT: CPT | Performed by: SURGERY

## 2023-04-26 NOTE — PROGRESS NOTES
Indira Christensen is a 47year old female who presents today for a follow-up after bilateral breast implant removal, replacement with permanent implant (Natrelle Inspira Allergan soft touch breast implant silicone style  mL), capsulotomy and partial capsulotomy right breast, capsulorrhaphy left breast on 2/21/2023. She denies fever and chills. She denies nausea, vomiting, diarrhea or constipation. Her pain is controlled. She reports some squeaking from the right reconstructed breast with any movement. She first noticed a whooshing sound but it is now turned to more of a squeaking sound. She notes some tightness and some occasional discomfort of this breast.    Physical Exam     Breasts: Bilateral breast incisions well-healed. No wound drainage or wound dehiscence. Squeaking sound was reproducible and was audible. No evidence of hematoma or seroma. Bilateral mastectomy skin is without erythema, ecchymosis, necrosis, skin breakdown. Good shape and symmetry overall. Right reconstructed breast slightly tighter than the left   There were no vitals filed for this visit. Assessment and Plan     Indira Christensen is doing well s/p bilateral breast implant removal, replacement with permanent implant (Natrelle Inspira Allergan soft touch breast implant silicone style  mL), capsulotomy and partial capsulotomy right breast, capsulorrhaphy left breast on 2/21/2023    A right breast ultrasound was ordered to evaluate implant integrity given the persistent sound the implant is making. We discussed massage of the right breast as well as physical therapy to work on scar massage, capsular contracture prevention and ultrasound techniques. We discussed avoiding activities that activate her pectoralis muscles. We will follow-up after her ultrasound. Questions were answered. Patient understands.

## 2023-06-01 ENCOUNTER — HOSPITAL ENCOUNTER (OUTPATIENT)
Dept: MAMMOGRAPHY | Facility: HOSPITAL | Age: 53
Discharge: HOME | End: 2023-06-01
Attending: PHYSICIAN ASSISTANT
Payer: COMMERCIAL

## 2023-06-01 ENCOUNTER — TELEPHONE (OUTPATIENT)
Dept: SURGERY | Facility: CLINIC | Age: 53
End: 2023-06-01

## 2023-06-01 DIAGNOSIS — Z90.13 ACQUIRED ABSENCE OF BILATERAL BREASTS AND NIPPLES: ICD-10-CM

## 2023-06-01 PROCEDURE — 76641 ULTRASOUND BREAST COMPLETE: CPT | Performed by: PHYSICIAN ASSISTANT

## 2023-06-01 NOTE — TELEPHONE ENCOUNTER
I called pt and discussed her US results. There is no rupture or other abnormality. I discussed with the pt that if her implant squeaking persists we will consider ordering an MRI in the fall. She understands and questions were answered.

## 2023-08-04 ENCOUNTER — OFFICE VISIT (OUTPATIENT)
Dept: SURGERY | Facility: CLINIC | Age: 53
End: 2023-08-04
Payer: COMMERCIAL

## 2023-08-04 VITALS
HEIGHT: 68 IN | BODY MASS INDEX: 22.37 KG/M2 | RESPIRATION RATE: 14 BRPM | OXYGEN SATURATION: 97 % | WEIGHT: 147.63 LBS | DIASTOLIC BLOOD PRESSURE: 80 MMHG | HEART RATE: 62 BPM | SYSTOLIC BLOOD PRESSURE: 121 MMHG

## 2023-08-04 DIAGNOSIS — Z85.3 HISTORY OF RIGHT BREAST CANCER: ICD-10-CM

## 2023-08-04 DIAGNOSIS — N63.12 MASS OF UPPER INNER QUADRANT OF RIGHT BREAST: Primary | ICD-10-CM

## 2023-08-04 PROCEDURE — 3008F BODY MASS INDEX DOCD: CPT | Performed by: SURGERY

## 2023-08-04 PROCEDURE — 3079F DIAST BP 80-89 MM HG: CPT | Performed by: SURGERY

## 2023-08-04 PROCEDURE — 99213 OFFICE O/P EST LOW 20 MIN: CPT | Performed by: SURGERY

## 2023-08-04 PROCEDURE — 3074F SYST BP LT 130 MM HG: CPT | Performed by: SURGERY

## 2023-08-09 PROBLEM — N63.12 MASS OF UPPER INNER QUADRANT OF RIGHT BREAST: Status: ACTIVE | Noted: 2023-08-09

## 2023-08-15 ENCOUNTER — PATIENT MESSAGE (OUTPATIENT)
Dept: INTERNAL MEDICINE CLINIC | Facility: CLINIC | Age: 53
End: 2023-08-15

## 2023-08-15 NOTE — TELEPHONE ENCOUNTER
Dear Marjorie Riddle,     I hope you are well. Your pap smear is NORMAL. You do not need a repeat for 5 years.      Sincerely,     Dr. Jyothi Garcia   Written by Jyothi Garcia MD on 7/25/2019  1:38 PM CDT

## 2023-08-15 NOTE — TELEPHONE ENCOUNTER
From: Pierce Scanlon  To: Janie Lea MD  Sent: 8/15/2023 12:43 PM CDT  Subject: Cervical cancer screening     Hi, Dr. Alonzo Verdugo,    I am seeing a message on my chart that says I am overdue for my cervical cancer screening. Should I make an appointment with you for this?     Thanks,    Xiang العراقي

## 2023-08-30 ENCOUNTER — TELEPHONE (OUTPATIENT)
Dept: SURGERY | Facility: CLINIC | Age: 53
End: 2023-08-30

## 2023-09-01 ENCOUNTER — TELEPHONE (OUTPATIENT)
Dept: SURGERY | Facility: CLINIC | Age: 53
End: 2023-09-01

## 2023-09-06 ENCOUNTER — HOSPITAL ENCOUNTER (OUTPATIENT)
Dept: MRI IMAGING | Facility: HOSPITAL | Age: 53
Discharge: HOME | End: 2023-09-06
Attending: SURGERY
Payer: COMMERCIAL

## 2023-09-06 DIAGNOSIS — Z85.3 HISTORY OF RIGHT BREAST CANCER: ICD-10-CM

## 2023-09-06 DIAGNOSIS — N63.12 MASS OF UPPER INNER QUADRANT OF RIGHT BREAST: ICD-10-CM

## 2023-09-06 PROCEDURE — 77049 MRI BREAST C-+ W/CAD BI: CPT | Performed by: SURGERY

## 2023-09-06 PROCEDURE — A9575 INJ GADOTERATE MEGLUMI 0.1ML: HCPCS

## 2023-09-06 RX ORDER — GADOTERATE MEGLUMINE 376.9 MG/ML
15 INJECTION INTRAVENOUS
Status: COMPLETED | OUTPATIENT
Start: 2023-09-06 | End: 2023-09-06

## 2023-09-06 RX ORDER — GADOTERATE MEGLUMINE 376.9 MG/ML
15 INJECTION INTRAVENOUS
Status: DISCONTINUED | OUTPATIENT
Start: 2023-09-06 | End: 2023-09-08

## 2023-09-06 RX ADMIN — GADOTERATE MEGLUMINE 14 ML: 376.9 INJECTION INTRAVENOUS at 20:04:00

## 2023-09-08 ENCOUNTER — OFFICE VISIT (OUTPATIENT)
Dept: SURGERY | Facility: CLINIC | Age: 53
End: 2023-09-08
Payer: COMMERCIAL

## 2023-09-08 DIAGNOSIS — Z90.13 ACQUIRED ABSENCE OF BILATERAL BREASTS AND NIPPLES: Primary | ICD-10-CM

## 2023-09-08 PROCEDURE — 99213 OFFICE O/P EST LOW 20 MIN: CPT | Performed by: SURGERY

## 2023-09-08 NOTE — PATIENT INSTRUCTIONS
Surgeon:              Dr. Fercho Lubin. Dawood Macedo, PhD                                          Tel:         [phone]                                  Fax:        [phone]      Surgery/Procedure: Revision of right reconstructed breast with implant exchange into prepectoral position, complete capsulectomy, acellular dermal matrix placement, SPY  2 hours, general anesthesia, outpatient, possibly 11/7/2023 at [facility]    Dx Code:  5000 [address] vd:  [facility]: [address], [place], [address]           [phone]  [facility]: [address], [facility], [place]               [phone]    1. Someone will need to drive you to and from the hospital if your procedure is outpatient. 2.Do not drink alcohol or smoke 24 hours prior to your procedure. 3. Bring a picture ID and your insurance card. 4. You will be contacted by the hospital the day before to confirm the procedure time and location. 5. The hospital will also contact you approximately one week before surgery to schedule your COVID test (only if surgery is inpatient/overnight stay). Please inform us if you develop any Covid-19 like symptoms, test positive or have been exposed for Covid- 19 prior to surgery. 6. Do not take any herbal supplements or blood thinners at least one week before your procedure/surgery. This includes NSAID's (aspirin, baby aspirin, Motrin, Ibuprofen, Aleve, Advil, Naproxen, etc), Plavix, fish oil, vitamin E, turmeric, CoQ10, or green tea supplements, etc. *TYLENOL or acetaminophen is ok to take*    7. PRE-OPERATIVE TESTING: History and physical with medical clearance is REQUIRED within 30 days of the surgery date and is mandatory per Dr. Dawood Macedo. *If this is not done, your surgery will be postponed*  MEDICAL CLEARANCE WITH DR. Bajwa  CBC  CMP  EKG    8.  Please inform us if you start or change any medications at least one week before surgery (ex: blood thinners, weight loss medications, diabetic medications, herbal supplements, etc)    9. Does patient have diagnosis of sleep apnea?     [   ] Yes     [  X ]  No    Consent obtained   Photos taken on 9/8/2023

## 2023-09-11 DIAGNOSIS — Z90.13 ACQUIRED ABSENCE OF BILATERAL BREASTS AND NIPPLES: Primary | ICD-10-CM

## 2023-10-13 RX ORDER — SPIRONOLACTONE 100 MG/1
100 TABLET, FILM COATED ORAL DAILY
COMMUNITY

## 2023-10-19 ENCOUNTER — TELEPHONE (OUTPATIENT)
Dept: INTERNAL MEDICINE CLINIC | Facility: CLINIC | Age: 53
End: 2023-10-19

## 2023-10-19 ENCOUNTER — OFFICE VISIT (OUTPATIENT)
Dept: INTERNAL MEDICINE CLINIC | Facility: CLINIC | Age: 53
End: 2023-10-19
Payer: COMMERCIAL

## 2023-10-19 ENCOUNTER — TELEPHONE (OUTPATIENT)
Dept: SURGERY | Facility: CLINIC | Age: 53
End: 2023-10-19

## 2023-10-19 ENCOUNTER — LAB ENCOUNTER (OUTPATIENT)
Dept: LAB | Age: 53
End: 2023-10-19
Attending: INTERNAL MEDICINE
Payer: COMMERCIAL

## 2023-10-19 VITALS
BODY MASS INDEX: 22.31 KG/M2 | RESPIRATION RATE: 16 BRPM | HEART RATE: 77 BPM | HEIGHT: 68 IN | SYSTOLIC BLOOD PRESSURE: 118 MMHG | TEMPERATURE: 98 F | WEIGHT: 147.19 LBS | OXYGEN SATURATION: 98 % | DIASTOLIC BLOOD PRESSURE: 72 MMHG

## 2023-10-19 DIAGNOSIS — Z90.13 ACQUIRED ABSENCE OF BILATERAL BREASTS AND NIPPLES: ICD-10-CM

## 2023-10-19 DIAGNOSIS — F41.1 GAD (GENERALIZED ANXIETY DISORDER): ICD-10-CM

## 2023-10-19 DIAGNOSIS — Z01.818 PREOP EXAM FOR INTERNAL MEDICINE: Primary | ICD-10-CM

## 2023-10-19 DIAGNOSIS — Z01.818 PREOP EXAM FOR INTERNAL MEDICINE: ICD-10-CM

## 2023-10-19 DIAGNOSIS — L65.9 ALOPECIA: ICD-10-CM

## 2023-10-19 PROBLEM — Z85.3 HISTORY OF BREAST CANCER: Status: ACTIVE | Noted: 2023-10-19

## 2023-10-19 LAB
ALBUMIN SERPL-MCNC: 3.8 G/DL (ref 3.4–5)
ALBUMIN/GLOB SERPL: 1.1 {RATIO} (ref 1–2)
ALP LIVER SERPL-CCNC: 32 U/L
ALT SERPL-CCNC: 22 U/L
ANION GAP SERPL CALC-SCNC: 4 MMOL/L (ref 0–18)
AST SERPL-CCNC: 10 U/L (ref 15–37)
ATRIAL RATE: 74 BPM
BASOPHILS # BLD AUTO: 0.03 X10(3) UL (ref 0–0.2)
BASOPHILS NFR BLD AUTO: 0.6 %
BILIRUB SERPL-MCNC: 1 MG/DL (ref 0.1–2)
BUN BLD-MCNC: 14 MG/DL (ref 7–18)
CALCIUM BLD-MCNC: 9.4 MG/DL (ref 8.5–10.1)
CHLORIDE SERPL-SCNC: 105 MMOL/L (ref 98–112)
CO2 SERPL-SCNC: 27 MMOL/L (ref 21–32)
CREAT BLD-MCNC: 0.76 MG/DL
EGFRCR SERPLBLD CKD-EPI 2021: 94 ML/MIN/1.73M2 (ref 60–?)
EOSINOPHIL # BLD AUTO: 0.05 X10(3) UL (ref 0–0.7)
EOSINOPHIL NFR BLD AUTO: 0.9 %
ERYTHROCYTE [DISTWIDTH] IN BLOOD BY AUTOMATED COUNT: 13.2 %
FASTING STATUS PATIENT QL REPORTED: YES
GLOBULIN PLAS-MCNC: 3.4 G/DL (ref 2.8–4.4)
GLUCOSE BLD-MCNC: 88 MG/DL (ref 70–99)
HCT VFR BLD AUTO: 37.9 %
HGB BLD-MCNC: 12.7 G/DL
IMM GRANULOCYTES # BLD AUTO: 0.02 X10(3) UL (ref 0–1)
IMM GRANULOCYTES NFR BLD: 0.4 %
LYMPHOCYTES # BLD AUTO: 1.15 X10(3) UL (ref 1–4)
LYMPHOCYTES NFR BLD AUTO: 21.1 %
MCH RBC QN AUTO: 32.8 PG (ref 26–34)
MCHC RBC AUTO-ENTMCNC: 33.5 G/DL (ref 31–37)
MCV RBC AUTO: 97.9 FL
MONOCYTES # BLD AUTO: 0.54 X10(3) UL (ref 0.1–1)
MONOCYTES NFR BLD AUTO: 9.9 %
NEUTROPHILS # BLD AUTO: 3.65 X10 (3) UL (ref 1.5–7.7)
NEUTROPHILS # BLD AUTO: 3.65 X10(3) UL (ref 1.5–7.7)
NEUTROPHILS NFR BLD AUTO: 67.1 %
OSMOLALITY SERPL CALC.SUM OF ELEC: 282 MOSM/KG (ref 275–295)
P AXIS: 55 DEGREES
P-R INTERVAL: 162 MS
PLATELET # BLD AUTO: 236 10(3)UL (ref 150–450)
POTASSIUM SERPL-SCNC: 3.8 MMOL/L (ref 3.5–5.1)
PROT SERPL-MCNC: 7.2 G/DL (ref 6.4–8.2)
Q-T INTERVAL: 386 MS
QRS DURATION: 86 MS
QTC CALCULATION (BEZET): 428 MS
R AXIS: 37 DEGREES
RBC # BLD AUTO: 3.87 X10(6)UL
SODIUM SERPL-SCNC: 136 MMOL/L (ref 136–145)
T AXIS: 54 DEGREES
VENTRICULAR RATE: 74 BPM
WBC # BLD AUTO: 5.4 X10(3) UL (ref 4–11)

## 2023-10-19 PROCEDURE — 3008F BODY MASS INDEX DOCD: CPT | Performed by: INTERNAL MEDICINE

## 2023-10-19 PROCEDURE — 85025 COMPLETE CBC W/AUTO DIFF WBC: CPT | Performed by: INTERNAL MEDICINE

## 2023-10-19 PROCEDURE — 93000 ELECTROCARDIOGRAM COMPLETE: CPT | Performed by: INTERNAL MEDICINE

## 2023-10-19 PROCEDURE — 3074F SYST BP LT 130 MM HG: CPT | Performed by: INTERNAL MEDICINE

## 2023-10-19 PROCEDURE — 99243 OFF/OP CNSLTJ NEW/EST LOW 30: CPT | Performed by: INTERNAL MEDICINE

## 2023-10-19 PROCEDURE — 90686 IIV4 VACC NO PRSV 0.5 ML IM: CPT | Performed by: INTERNAL MEDICINE

## 2023-10-19 PROCEDURE — 80053 COMPREHEN METABOLIC PANEL: CPT | Performed by: INTERNAL MEDICINE

## 2023-10-19 PROCEDURE — 3078F DIAST BP <80 MM HG: CPT | Performed by: INTERNAL MEDICINE

## 2023-10-19 PROCEDURE — 90471 IMMUNIZATION ADMIN: CPT | Performed by: INTERNAL MEDICINE

## 2023-10-19 RX ORDER — AMOXICILLIN 500 MG
CAPSULE ORAL
COMMUNITY
Start: 2022-09-01

## 2023-10-19 NOTE — TELEPHONE ENCOUNTER
Iris called in regards to pt having a surgery on 10/24 instead of Nov date    Pt is having a revision of rt breast    SV- are we able to squeeze pt in?

## 2023-10-19 NOTE — PATIENT INSTRUCTIONS
Good luck with your surgery    Blood tests have been ordered    You may continue your medications     you received the flu vaccine today and it may cause some soreness for 24 hours for which you may take Tylenol 1000 mg up to 3 times a day for pain relief

## 2023-10-19 NOTE — TELEPHONE ENCOUNTER
Future Appointments   Date Time Provider Department Center   10/19/2023  2:30 PM Vera Fleming,  [facility] [place]   11/16/2023  2:00 PM Cherry Sharma APRN [facility] [place]   8/6/2024 11:45 AM Jaelyn Short MD [facility]

## 2023-10-19 NOTE — TELEPHONE ENCOUNTER
Called patient to offer her an earlier surgery date of 10/24 at [facility]. Patient agreeable to new date and location. I explained to the patient that she also needs to get her medical clearance right away. I was able to assist patient in getting into her PCP today for her clearance appointment. Patient was appreciative of the assistant.

## 2023-10-20 DIAGNOSIS — Z90.13 ACQUIRED ABSENCE OF BILATERAL BREASTS AND NIPPLES: Primary | ICD-10-CM

## 2023-10-24 ENCOUNTER — ANESTHESIA (OUTPATIENT)
Dept: SURGERY | Facility: HOSPITAL | Age: 53
End: 2023-10-24

## 2023-10-24 ENCOUNTER — HOSPITAL ENCOUNTER (OUTPATIENT)
Facility: HOSPITAL | Age: 53
Setting detail: HOSPITAL OUTPATIENT SURGERY
Discharge: HOME | End: 2023-10-24
Attending: SURGERY | Admitting: SURGERY

## 2023-10-24 ENCOUNTER — ANESTHESIA EVENT (OUTPATIENT)
Dept: SURGERY | Facility: HOSPITAL | Age: 53
End: 2023-10-24

## 2023-10-24 VITALS
TEMPERATURE: 99 F | BODY MASS INDEX: 22.01 KG/M2 | SYSTOLIC BLOOD PRESSURE: 101 MMHG | RESPIRATION RATE: 16 BRPM | DIASTOLIC BLOOD PRESSURE: 63 MMHG | HEART RATE: 70 BPM | HEIGHT: 68 IN | OXYGEN SATURATION: 100 % | WEIGHT: 145.19 LBS

## 2023-10-24 DIAGNOSIS — Z90.13 ACQUIRED ABSENCE OF BILATERAL BREASTS AND NIPPLES: ICD-10-CM

## 2023-10-24 LAB — B-HCG UR QL: NEGATIVE

## 2023-10-24 PROCEDURE — 0HPT0JZ REMOVAL OF SYNTHETIC SUBSTITUTE FROM RIGHT BREAST, OPEN APPROACH: ICD-10-PCS | Performed by: SURGERY

## 2023-10-24 PROCEDURE — 88305 TISSUE EXAM BY PATHOLOGIST: CPT | Performed by: SURGERY

## 2023-10-24 PROCEDURE — 0HRT0JZ REPLACEMENT OF RIGHT BREAST WITH SYNTHETIC SUBSTITUTE, OPEN APPROACH: ICD-10-PCS | Performed by: SURGERY

## 2023-10-24 PROCEDURE — 81025 URINE PREGNANCY TEST: CPT

## 2023-10-24 DEVICE — GRAFT HUM TISS THK2-2.8MM THCK M PERF CNTOUR: Type: IMPLANTABLE DEVICE | Site: BREAST | Status: FUNCTIONAL

## 2023-10-24 RX ORDER — KETAMINE HYDROCHLORIDE 50 MG/ML
INJECTION, SOLUTION, CONCENTRATE INTRAMUSCULAR; INTRAVENOUS AS NEEDED
Status: DISCONTINUED | OUTPATIENT
Start: 2023-10-24 | End: 2023-10-24 | Stop reason: SURG

## 2023-10-24 RX ORDER — MIDAZOLAM HYDROCHLORIDE 1 MG/ML
INJECTION INTRAMUSCULAR; INTRAVENOUS AS NEEDED
Status: DISCONTINUED | OUTPATIENT
Start: 2023-10-24 | End: 2023-10-24 | Stop reason: SURG

## 2023-10-24 RX ORDER — NALOXONE HYDROCHLORIDE 0.4 MG/ML
0.08 INJECTION, SOLUTION INTRAMUSCULAR; INTRAVENOUS; SUBCUTANEOUS AS NEEDED
Status: DISCONTINUED | OUTPATIENT
Start: 2023-10-24 | End: 2023-10-24

## 2023-10-24 RX ORDER — SODIUM CHLORIDE, SODIUM LACTATE, POTASSIUM CHLORIDE, CALCIUM CHLORIDE 600; 310; 30; 20 MG/100ML; MG/100ML; MG/100ML; MG/100ML
INJECTION, SOLUTION INTRAVENOUS CONTINUOUS
Status: DISCONTINUED | OUTPATIENT
Start: 2023-10-24 | End: 2023-10-24

## 2023-10-24 RX ORDER — ROCURONIUM BROMIDE 10 MG/ML
INJECTION, SOLUTION INTRAVENOUS AS NEEDED
Status: DISCONTINUED | OUTPATIENT
Start: 2023-10-24 | End: 2023-10-24 | Stop reason: SURG

## 2023-10-24 RX ORDER — MIDAZOLAM HYDROCHLORIDE 1 MG/ML
1 INJECTION INTRAMUSCULAR; INTRAVENOUS EVERY 5 MIN PRN
Status: DISCONTINUED | OUTPATIENT
Start: 2023-10-24 | End: 2023-10-24

## 2023-10-24 RX ORDER — HYDROCODONE BITARTRATE AND ACETAMINOPHEN 10; 325 MG/1; MG/1
2 TABLET ORAL ONCE AS NEEDED
Status: COMPLETED | OUTPATIENT
Start: 2023-10-24 | End: 2023-10-24

## 2023-10-24 RX ORDER — HYDROMORPHONE HYDROCHLORIDE 1 MG/ML
INJECTION, SOLUTION INTRAMUSCULAR; INTRAVENOUS; SUBCUTANEOUS AS NEEDED
Status: DISCONTINUED | OUTPATIENT
Start: 2023-10-24 | End: 2023-10-24 | Stop reason: SURG

## 2023-10-24 RX ORDER — LIDOCAINE HYDROCHLORIDE 10 MG/ML
INJECTION, SOLUTION EPIDURAL; INFILTRATION; INTRACAUDAL; PERINEURAL AS NEEDED
Status: DISCONTINUED | OUTPATIENT
Start: 2023-10-24 | End: 2023-10-24 | Stop reason: SURG

## 2023-10-24 RX ORDER — BUPIVACAINE HYDROCHLORIDE 5 MG/ML
INJECTION, SOLUTION EPIDURAL; INTRACAUDAL AS NEEDED
Status: DISCONTINUED | OUTPATIENT
Start: 2023-10-24 | End: 2023-10-24

## 2023-10-24 RX ORDER — DIPHENHYDRAMINE HYDROCHLORIDE 50 MG/ML
12.5 INJECTION INTRAMUSCULAR; INTRAVENOUS AS NEEDED
Status: DISCONTINUED | OUTPATIENT
Start: 2023-10-24 | End: 2023-10-24

## 2023-10-24 RX ORDER — MORPHINE SULFATE 4 MG/ML
4 INJECTION, SOLUTION INTRAMUSCULAR; INTRAVENOUS EVERY 5 MIN PRN
Status: DISCONTINUED | OUTPATIENT
Start: 2023-10-24 | End: 2023-10-24

## 2023-10-24 RX ORDER — MORPHINE SULFATE 4 MG/ML
INJECTION, SOLUTION INTRAMUSCULAR; INTRAVENOUS
Status: COMPLETED
Start: 2023-10-24 | End: 2023-10-24

## 2023-10-24 RX ORDER — ONDANSETRON 4 MG/1
4 TABLET, FILM COATED ORAL EVERY 8 HOURS PRN
Qty: 20 TABLET | Refills: 1 | Status: SHIPPED | OUTPATIENT
Start: 2023-10-24

## 2023-10-24 RX ORDER — MORPHINE SULFATE 4 MG/ML
2 INJECTION, SOLUTION INTRAMUSCULAR; INTRAVENOUS EVERY 5 MIN PRN
Status: DISCONTINUED | OUTPATIENT
Start: 2023-10-24 | End: 2023-10-24

## 2023-10-24 RX ORDER — HYDROCODONE BITARTRATE AND ACETAMINOPHEN 5; 325 MG/1; MG/1
1-2 TABLET ORAL EVERY 6 HOURS PRN
Qty: 40 TABLET | Refills: 0 | Status: SHIPPED | OUTPATIENT
Start: 2023-10-24

## 2023-10-24 RX ORDER — DEXAMETHASONE SODIUM PHOSPHATE 4 MG/ML
VIAL (ML) INJECTION AS NEEDED
Status: DISCONTINUED | OUTPATIENT
Start: 2023-10-24 | End: 2023-10-24 | Stop reason: SURG

## 2023-10-24 RX ORDER — PROCHLORPERAZINE EDISYLATE 5 MG/ML
5 INJECTION INTRAMUSCULAR; INTRAVENOUS EVERY 8 HOURS PRN
Status: DISCONTINUED | OUTPATIENT
Start: 2023-10-24 | End: 2023-10-24

## 2023-10-24 RX ORDER — HYDROCODONE BITARTRATE AND ACETAMINOPHEN 10; 325 MG/1; MG/1
1 TABLET ORAL ONCE AS NEEDED
Status: COMPLETED | OUTPATIENT
Start: 2023-10-24 | End: 2023-10-24

## 2023-10-24 RX ORDER — MEPERIDINE HYDROCHLORIDE 25 MG/ML
25 INJECTION INTRAMUSCULAR; INTRAVENOUS; SUBCUTANEOUS AS NEEDED
Status: DISCONTINUED | OUTPATIENT
Start: 2023-10-24 | End: 2023-10-24

## 2023-10-24 RX ORDER — METOCLOPRAMIDE HYDROCHLORIDE 5 MG/ML
INJECTION INTRAMUSCULAR; INTRAVENOUS AS NEEDED
Status: DISCONTINUED | OUTPATIENT
Start: 2023-10-24 | End: 2023-10-24 | Stop reason: SURG

## 2023-10-24 RX ORDER — CEPHALEXIN 500 MG/1
500 CAPSULE ORAL 4 TIMES DAILY
Qty: 28 CAPSULE | Refills: 0 | Status: SHIPPED | OUTPATIENT
Start: 2023-10-24 | End: 2023-10-31

## 2023-10-24 RX ORDER — ACETAMINOPHEN 500 MG
1000 TABLET ORAL ONCE
Status: DISCONTINUED | OUTPATIENT
Start: 2023-10-24 | End: 2023-10-24

## 2023-10-24 RX ORDER — CEFAZOLIN SODIUM/WATER 2 G/20 ML
2 SYRINGE (ML) INTRAVENOUS ONCE
Status: COMPLETED | OUTPATIENT
Start: 2023-10-24 | End: 2023-10-24

## 2023-10-24 RX ORDER — ACETAMINOPHEN 500 MG
1000 TABLET ORAL ONCE AS NEEDED
Status: COMPLETED | OUTPATIENT
Start: 2023-10-24 | End: 2023-10-24

## 2023-10-24 RX ORDER — DIPHENHYDRAMINE HYDROCHLORIDE 50 MG/ML
12.5 INJECTION INTRAMUSCULAR; INTRAVENOUS ONCE
Status: DISCONTINUED | OUTPATIENT
Start: 2023-10-24 | End: 2023-10-24

## 2023-10-24 RX ORDER — ONDANSETRON 2 MG/ML
4 INJECTION INTRAMUSCULAR; INTRAVENOUS EVERY 6 HOURS PRN
Status: DISCONTINUED | OUTPATIENT
Start: 2023-10-24 | End: 2023-10-24

## 2023-10-24 RX ORDER — MORPHINE SULFATE 4 MG/ML
6 INJECTION, SOLUTION INTRAMUSCULAR; INTRAVENOUS EVERY 5 MIN PRN
Status: DISCONTINUED | OUTPATIENT
Start: 2023-10-24 | End: 2023-10-24

## 2023-10-24 RX ORDER — DOCUSATE SODIUM 100 MG/1
100 CAPSULE, LIQUID FILLED ORAL 2 TIMES DAILY
Qty: 40 CAPSULE | Refills: 1 | Status: SHIPPED | OUTPATIENT
Start: 2023-10-24

## 2023-10-24 RX ORDER — SCOLOPAMINE TRANSDERMAL SYSTEM 1 MG/1
1 PATCH, EXTENDED RELEASE TRANSDERMAL ONCE
Status: DISCONTINUED | OUTPATIENT
Start: 2023-10-24 | End: 2023-10-24

## 2023-10-24 RX ORDER — ONDANSETRON 2 MG/ML
INJECTION INTRAMUSCULAR; INTRAVENOUS AS NEEDED
Status: DISCONTINUED | OUTPATIENT
Start: 2023-10-24 | End: 2023-10-24 | Stop reason: SURG

## 2023-10-24 RX ADMIN — METOCLOPRAMIDE HYDROCHLORIDE 10 MG: 5 INJECTION INTRAMUSCULAR; INTRAVENOUS at 10:00:00

## 2023-10-24 RX ADMIN — MIDAZOLAM HYDROCHLORIDE 2 MG: 1 INJECTION INTRAMUSCULAR; INTRAVENOUS at 07:40:00

## 2023-10-24 RX ADMIN — SODIUM CHLORIDE, SODIUM LACTATE, POTASSIUM CHLORIDE, CALCIUM CHLORIDE: 600; 310; 30; 20 INJECTION, SOLUTION INTRAVENOUS at 10:34:00

## 2023-10-24 RX ADMIN — HYDROMORPHONE HYDROCHLORIDE 0.5 MG: 1 INJECTION, SOLUTION INTRAMUSCULAR; INTRAVENOUS; SUBCUTANEOUS at 09:04:00

## 2023-10-24 RX ADMIN — LIDOCAINE HYDROCHLORIDE 5 ML: 10 INJECTION, SOLUTION EPIDURAL; INFILTRATION; INTRACAUDAL; PERINEURAL at 07:42:00

## 2023-10-24 RX ADMIN — ROCURONIUM BROMIDE 10 MG: 10 INJECTION, SOLUTION INTRAVENOUS at 08:35:00

## 2023-10-24 RX ADMIN — ONDANSETRON 4 MG: 2 INJECTION INTRAMUSCULAR; INTRAVENOUS at 07:55:00

## 2023-10-24 RX ADMIN — SODIUM CHLORIDE, SODIUM LACTATE, POTASSIUM CHLORIDE, CALCIUM CHLORIDE: 600; 310; 30; 20 INJECTION, SOLUTION INTRAVENOUS at 08:01:00

## 2023-10-24 RX ADMIN — DEXAMETHASONE SODIUM PHOSPHATE 8 MG: 4 MG/ML VIAL (ML) INJECTION at 07:55:00

## 2023-10-24 RX ADMIN — CEFAZOLIN SODIUM/WATER 2 G: 2 G/20 ML SYRINGE (ML) INTRAVENOUS at 07:49:00

## 2023-10-24 RX ADMIN — KETAMINE HYDROCHLORIDE 50 MG: 50 INJECTION, SOLUTION, CONCENTRATE INTRAMUSCULAR; INTRAVENOUS at 07:55:00

## 2023-10-24 RX ADMIN — ROCURONIUM BROMIDE 40 MG: 10 INJECTION, SOLUTION INTRAVENOUS at 07:42:00

## 2023-10-24 NOTE — ANESTHESIA PROCEDURE NOTES
Airway  Date/Time: 10/24/2023 7:44 AM  Urgency: elective    Airway not difficult    General Information and Staff    Patient location during procedure: OR  Anesthesiologist: Daniela Summers MD  Performed: anesthesiologist   Performed by: Daniela Summers MD  Authorized by: Daniela Summers MD      Indications and Patient Condition  Indications for airway management: anesthesia  Spontaneous Ventilation: absent  Sedation level: deep  Preoxygenated: yes  Patient position: sniffing  MILS maintained throughout  Mask difficulty assessment: 1 - vent by mask  No planned trial extubation    Final Airway Details  Final airway type: endotracheal airway      Successful airway: ETT  Cuffed: yes   Successful intubation technique: direct laryngoscopy  Endotracheal tube insertion site: oral  Blade: Wilma  Blade size: #4  ETT size (mm): 7.0    Cormack-Lehane Classification: grade I - full view of glottis  Placement verified by: capnometry   Cuff volume (mL): 8  Measured from: lips  ETT to lips (cm): 21  Number of attempts at approach: 1  Number of other approaches attempted: 0    Additional Comments  4% LIDO LTA BEFORE INTUBATION

## 2023-10-24 NOTE — BRIEF OP NOTE
Pre-Operative Diagnosis: Acquired absence of bilateral breasts and nipples [Z90.13]     Post-Operative Diagnosis: Acquired absence of bilateral breasts and nipples [Z90.13]      Procedure Performed:   Revision of right reconstructed breast with implant exchange into prepectoral position, capsulectomy, acellular dermal matrix placement    Surgeon(s) and Role:     Rafaela Cowart MD - Primary    Assistant(s):  PA: DEANDRE Bourgeois     Surgical Findings: see dictation     Specimen: see pathology     Estimated Blood Loss: Blood Output: 10 mL (10/24/2023 10:31 AM)    DEANDRE Wood  10/24/2023  10:43 AM

## 2023-10-24 NOTE — ANESTHESIA POSTPROCEDURE EVALUATION
[facility] Patient Status:  Hospital Outpatient Surgery   Age/Gender 47year old female MRN [id number]   [facility] SURGERY Attending Za Welch MD   Hosp Day # 0 PCP Chepe Jaramillo MD       Anesthesia Post-op Note    Revision of right reconstructed breast with implant exchange into prepectoral position, complete capsulectomy, acellular dermal matrix placement, SPY    Procedure Summary       Date: 10/24/23 Room / [facility] 06 / [facility] OR    Anesthesia Start: 4785 Anesthesia Stop: 9662    Procedure: Revision of right reconstructed breast with implant exchange into prepectoral position, complete capsulectomy, acellular dermal matrix placement, SPY (Right) Diagnosis:       Acquired absence of bilateral breasts and nipples      (Acquired absence of bilateral breasts and nipples [Z90.13])    Surgeons: Za Welch MD Anesthesiologist: Renetta Chung MD    Anesthesia Type: general ASA Status: 2            Anesthesia Type: general    Vitals Value Taken Time   /66 10/24/23 1044   Temp 98.2 10/24/23 1044   Pulse 89 10/24/23 1044   Resp 16 10/24/23 1044   SpO2 96% on RA 10/24/23 1044       Patient Location: PACU    Anesthesia Type: general    Airway Patency: patent and extubated    Postop Pain Control: adequate    Mental Status: mildly sedated but able to meaningfully participate in the post-anesthesia evaluation    Nausea/Vomiting: none    Cardiopulmonary/Hydration status: stable euvolemic    Complications: no apparent anesthesia related complications    Postop vital signs: stable    Dental Exam: Unchanged from Preop    Patient to be discharged from PACU when criteria met.

## 2023-10-24 NOTE — DISCHARGE INSTRUCTIONS
Plastic Surgery Home Care Instructions      We hope you were pleased with your care at [facility]. We wish you the best outcome and overall experience with your operation. These instructions will help to minimize pain, optimize healing, and improve the likelihood of a successful result. What To Expect  There will be some spotting of the incision lines for the next few days. Discomfort in the surgical area is typical.    Bandages (Dressing)  Keep dressings clean and dry  Do not remove your bra unless for hygiene purposes - compression is key - especially at night. You can change to a supportive sports bra or camilsole if this provides good compression and you are more comfortable in that rather than the surgical bra. No underwire. Reinforce the dressing with insertion of additional padding as needed - this is not required - for your comfort only    Bathing/Showers  You can resume showering tomorrow. Let soap and water run over the incision. Don't scrub. Before shower, remove bra and gauze. Leave steri-strips in place. After shower, replace bra. You can wear gauze as needed. No baths, swimming, or hot tubs until you receive medical permission    Pain Medication: Norco  Take one or two tablets every six hours as needed for pain. Do not take narcotics, if you do not have pain. Keep stools soft. Take a stool softener (Metamucil, Milk of Magnesia, Colace). Eating fruit will also help to prevent constipation    Antibiotics: Keflex 4x/day for 7 days  Antibiotics will help minimize the risk of a wound infection   Fill the prescription as directed   Follow the instructions as written on the bottle's label  Call our office if you experience nausea, rash, or other symptoms which might be a possible side effect. Over-The-Counter Medication  Non-prescription anti-inflammatory medications can also help to ease the pain.   You can take Aleve or ibuprofen starting 48 hours after surgery  Take as directed on the bottle  Drink a full glass of water with the medication    Home Medication  Resume your home medications as instructed  Do not resume herbal medications for two weeks    Diet  Resume your normal diet    Activity  No strenuous activity or heavy lifting (no lifting over 10 pounds)  No activities that involve your pectoralis muscles (no planks, push-ups, etc)  You can go up and down the stairs as tolerated. You cannot return to work, if your work requires strenuous activity. You cannot return to physical exercise, sports, or gym workouts until you are allowed to participate in strenuous activity. Driving  Do not drive, if you are taking pain medication. Return to Work or [employer] can return to work when you are not taking pain medication, if your work does not involve strenuous activity. Contact the office, if you need a medical note. Follow-up Appointment   Our office will call you to set up a time    Questions or Concerns  Call Dr. Ofe Nunez office if you experience sudden swelling of the breast or purple/red/black discoloration of the breast.     Osmin Self   Thank you for coming to [facility] for your operation. The nurses and the anesthesiologist try very hard to make sure you receive the best care possible. Your trust in them is greatly appreciated.     Thanks so much,  Dr. Kelsea Carmona PA-C

## 2023-10-25 NOTE — OPERATIVE REPORT
[facility]    PATIENT'S NAME: PETAR MORGAN   ATTENDING PHYSICIAN: Amanda Perdue MD   OPERATING PHYSICIAN: Amanda Perdue MD   PATIENT ACCOUNT#:   [id number]    LOCATION:  [facility] [address] [facility] [id number] [place] [address] Road #:   [id number]       YOB: 1970  ADMISSION DATE:       10/24/2023      OPERATION DATE:  10/24/2023    OPERATIVE REPORT    PREOPERATIVE DIAGNOSIS:  Right breast capsular contracture after history of breast cancer and implant-based reconstruction. POSTOPERATIVE DIAGNOSIS:  Right breast capsular contracture after history of breast cancer and implant-based reconstruction. PROCEDURE:  Right breast implant removal and capsulectomy; right pectoralis major muscle flap repositioning and change into prepectoral pocket; placement of new silicone implant (Allergan Natrelle Inspira style SSX SoftTouch 545 mL, serial #[id number]); placement of acellular dermal matrix, AlloDerm Select tissue matrix, perforated, contoured, medium size. ASSISTANT:  Zaira Reyes PA-C. ANESTHESIA:  General endotracheal anesthesia. COMPLICATIONS:  None. BLOOD LOSS:  Minimal.    INDICATIONS:  This is a 21-year-old female with a history of breast cancer for which she underwent implant-based reconstruction. She had a recurrence requiring additional excision and radiation therapy, as well as exchange of recalled implants. Now presents with right-sided recurrent capsular contracture, and we discussed in the office the options for surgical revision, as she has maxed out all nonsurgical treatment options. Potential risks, complications, benefits and alternatives were discussed with the patient. Risks and complications including, but not limited to, infection, bleeding, scarring, damage to surrounding structures, hematoma, seroma, recurrent capsular contracture, ESTEPHANIE-ALCL, ESTEPHANIE-SCCa, breast asymmetry, contour irregularity and deformity, need for further surgery.   We discussed that in her case, we would reposition the implant into a prepectoral pocket and reposition the pectoralis muscle and do a complete capsulectomy to reduce the risk of repeat capsular contracture. The patient understands and wishes to proceed. Questions were answered. OPERATIVE TECHNIQUE:  Patient was identified in the preoperative area, informed consent was obtained, and sites were marked. Patient was then brought back to the operating room and placed in supine position. She was adequately padded, and sequential compression devices were applied. General endotracheal anesthesia was administered. Perioperative antibiotics were given. Then, her entire chest and breasts were prepped and draped in the usual sterile fashion. Then, the procedure was started with opening the inframammary fold incision on the right side. The scar was excised and sent to Pathology. Electrocautery was used to dissect down to the capsule. Then, the mastectomy skin flaps were lifted off the capsule. Then, the capsule was opened and the implant was removed. Then a complete capsulectomy was performed using only a small strip on the lower pole and a small strip of capsule behind the retroareolar area. The capsule was sent to Pathology. A separate piece of capsule on the medial aspect in the previous area of recurrence was sent separately. The pocket was thoroughly irrigated with antibiotic solution and Irrisept solution. Then, pectoralis major muscle was lifted off the capsule and the subcutaneous mastectomy skin flap and was repositioned down to the chest wall. This was done using 0 Vicryl sutures. The pectoralis major muscle flap was tacked down and the prepectoral pocket was created. Then, a medium-sized contoured piece of AlloDerm was cut in half, and 1 portion was used for the contour irregularity in the previous pectoralis area. This was tacked to the mastectomy flaps with 0 Vicryl suture.   The second piece was used to extend the lower pole coverage of the edge of the previous capsule for additional support. Then, a new implant was placed as listed above, and then the inframammary fold incision was closed overlying the lower edge capsule which was laid over the implant without specific tightening of the capsule to the superior flap. Then, 3-0 Vicryl sutures were used for closure of that incision with the deep layer, followed by a deep dermal suture layer, and then 4-0 Monocryl subcuticular sutures were used for the skin. Dermabond and Steri-Strips were applied. Fluffs and a bra were applied. The patient tolerated the procedure well and awoke from anesthesia without difficulty. All sponge, instrument, and needle counts were correct at the end of the case. Dictated By Angel Allen.  Bronson Adame MD  d: 10/24/2023 18:54:11  t: 10/24/2023 43:61:89  [facility] [id number]  K/

## 2023-10-28 ENCOUNTER — PATIENT MESSAGE (OUTPATIENT)
Dept: SURGERY | Facility: CLINIC | Age: 53
End: 2023-10-28

## 2023-10-30 ENCOUNTER — OFFICE VISIT (OUTPATIENT)
Dept: SURGERY | Facility: CLINIC | Age: 53
End: 2023-10-30

## 2023-10-30 DIAGNOSIS — Z90.13 ACQUIRED ABSENCE OF BILATERAL BREASTS AND NIPPLES: Primary | ICD-10-CM

## 2023-10-30 PROCEDURE — 99024 POSTOP FOLLOW-UP VISIT: CPT | Performed by: PHYSICIAN ASSISTANT

## 2023-10-30 RX ORDER — CEPHALEXIN 500 MG/1
500 CAPSULE ORAL 4 TIMES DAILY
Qty: 12 CAPSULE | Refills: 0 | Status: SHIPPED | OUTPATIENT
Start: 2023-10-30

## 2023-10-30 NOTE — TELEPHONE ENCOUNTER
Received call from patient on 10/28 evening. She reports noticing redness of her her right breast.  She denies fevers or chills. Photos were sent and show erythema of the lower inner aspect of the right breast.  Patient instructed to continue antibiotic prophylaxis and compress. Called patient on 10/29 morning. She reports reports that erythema is slightly improved. She denies fevers chills. She instructed to call for fevers, chills, or spreading erythema. We discussed presenting to office this week for follow-up.

## 2023-10-30 NOTE — PROGRESS NOTES
Tati Cantu is a 47year old female who presents today for a follow-up after right breast implant removal and capsulectomy, right pectoralis major muscle flap repositioning and change into prepectoral pocket, placement of new silicone implant (Allergan Natrelle Inspira style SSX Soft Touch 545 mL), placement of ADM on 10/24/2023 with Dr. Theresa Lux. She denies fever and chills. She denies nausea, vomiting, diarrhea or constipation. Her pain is controlled. She called over the weekend reporting some redness to her right medial breast. This is improving. She also feels some tissue that she did not feel before on the right breast around 10 o'clock lateral to the implant. Physical Exam     Breasts: right breast incision with steri-strips in place; no wound drainage or surrounding erythema; right medial breast with mild erythema (improved per patient report and from previous photo); right reconstructed breast soft; no ecchymosis, necrosis or skin breakdown; no evidence of hematoma/seroma    There were no vitals filed for this visit. Assessment and Plan     Tati Cantu is doing well s/p right breast implant removal and capsulectomy, right pectoralis major muscle flap repositioning and change into prepectoral pocket, placement of new silicone implant (Allergan Natrelle Inspira style SSX Soft Touch 545 mL), placement of ADM on 10/24/2023 with Dr. Theresa Lux. I sent an extension of her antibiotic to her pharmacy. She should continue to monitor the area of redness and let us know if this gets worse or changes. An ultrasound order was placed to assess the tissue she is feeling at 10 o'clock on the right breast. She will continue with compression and activity restrictions. She will follow up with Dr. Theresa Lux next week or sooner if she has any questions or concerns. Questions were answered. Patient understands.      Antonia Barraza  10/30/2023  11:45 AM

## 2023-11-08 ENCOUNTER — OFFICE VISIT (OUTPATIENT)
Dept: SURGERY | Facility: CLINIC | Age: 53
End: 2023-11-08
Payer: COMMERCIAL

## 2023-11-08 DIAGNOSIS — Z90.13 ACQUIRED ABSENCE OF BILATERAL BREASTS AND NIPPLES: Primary | ICD-10-CM

## 2023-11-08 PROCEDURE — 99024 POSTOP FOLLOW-UP VISIT: CPT | Performed by: SURGERY

## 2023-11-08 NOTE — PROGRESS NOTES
Gertrudis Knapp is a 47year old female who presents today for a follow-up after right breast implant removal and capsulectomy, right pectoralis major muscle flap repositioning and change into prepectoral pocket, placement of new silicone implant (Allergan Natrelle Inspira style SSX Soft Touch 545 mL), placement of ADM on 10/24/2023. She denies fever and chills. She denies nausea, vomiting, diarrhea or constipation. Her pain is controlled. She completed her antibiotic. Physical Exam     Breasts: right breast incision clean, dry and intact; no wound drainage or wound dehiscence. Right medial breast with mild erythema. Right reconstructed breast soft; no ecchymosis, necrosis or skin breakdown; no evidence of hematoma/seroma. Left breast incision remains well healed. There were no vitals filed for this visit. Assessment and Plan     Gertrudis Knapp is doing well s/p right breast implant removal and capsulectomy, right pectoralis major muscle flap repositioning and change into prepectoral pocket, placement of new silicone implant (Allergan Natrelle Inspira style SSX Soft Touch 545 mL), placement of ADM on 10/24/2023     We reviewed compression and activity restrictions. She should avoid pec exercises. We will monitor the area of mild erythema. She should let us know if this gets worse. She will apply vitamin E oil to the scars. She may get her breast ultrasound that was ordered at her last visit. She will follow-up in 2 to 3 months for recheck. Questions were answered. Patient understands.

## 2023-11-11 ENCOUNTER — TELEPHONE (OUTPATIENT)
Dept: SURGERY | Facility: CLINIC | Age: 53
End: 2023-11-11

## 2023-11-11 DIAGNOSIS — Z90.13 ABSENCE OF BREAST, BILATERAL: Primary | ICD-10-CM

## 2023-11-11 RX ORDER — CEPHALEXIN 500 MG/1
500 CAPSULE ORAL 4 TIMES DAILY
Qty: 40 CAPSULE | Refills: 0 | Status: SHIPPED | OUTPATIENT
Start: 2023-11-11 | End: 2023-11-21

## 2023-11-11 NOTE — TELEPHONE ENCOUNTER
Pt called last night with some increased redness on her medial breast, she sent email with pharmacy information and I called in Keflex for her. Pt instructed to call with any persistent issues, pt understands.

## 2023-11-21 ENCOUNTER — HOSPITAL ENCOUNTER (OUTPATIENT)
Dept: MAMMOGRAPHY | Facility: HOSPITAL | Age: 53
Discharge: HOME | End: 2023-11-21
Attending: PHYSICIAN ASSISTANT
Payer: COMMERCIAL

## 2023-11-21 DIAGNOSIS — Z90.13 ACQUIRED ABSENCE OF BILATERAL BREASTS AND NIPPLES: ICD-10-CM

## 2023-11-21 PROCEDURE — 76642 ULTRASOUND BREAST LIMITED: CPT | Performed by: PHYSICIAN ASSISTANT

## 2023-12-20 ENCOUNTER — TELEPHONE (OUTPATIENT)
Dept: INTERNAL MEDICINE CLINIC | Facility: CLINIC | Age: 53
End: 2023-12-20

## 2023-12-20 NOTE — TELEPHONE ENCOUNTER
Patient dropped off form for completion    Form: [employer] of [address], [address] [address]    Patient would like to pickup form prior to upcoming appointment so she may start work the first week of January. Call pt when completed. Form placed in SV folder in front office.     Last cpx 10/19/2023  Upcoming cpx 1/9/2024

## 2024-01-09 ENCOUNTER — OFFICE VISIT (OUTPATIENT)
Dept: INTERNAL MEDICINE CLINIC | Facility: CLINIC | Age: 54
End: 2024-01-09
Payer: COMMERCIAL

## 2024-01-09 ENCOUNTER — LAB ENCOUNTER (OUTPATIENT)
Dept: LAB | Age: 54
End: 2024-01-09
Attending: INTERNAL MEDICINE
Payer: COMMERCIAL

## 2024-01-09 VITALS
BODY MASS INDEX: 22.73 KG/M2 | OXYGEN SATURATION: 97 % | HEART RATE: 58 BPM | WEIGHT: 150 LBS | DIASTOLIC BLOOD PRESSURE: 74 MMHG | SYSTOLIC BLOOD PRESSURE: 110 MMHG | HEIGHT: 68 IN | RESPIRATION RATE: 16 BRPM | TEMPERATURE: 98 F

## 2024-01-09 DIAGNOSIS — Z85.3 HISTORY OF BREAST CANCER: ICD-10-CM

## 2024-01-09 DIAGNOSIS — L65.9 ALOPECIA: ICD-10-CM

## 2024-01-09 DIAGNOSIS — Z00.00 ROUTINE PHYSICAL EXAMINATION: Primary | ICD-10-CM

## 2024-01-09 DIAGNOSIS — Z00.00 ROUTINE PHYSICAL EXAMINATION: ICD-10-CM

## 2024-01-09 DIAGNOSIS — F51.01 PRIMARY INSOMNIA: ICD-10-CM

## 2024-01-09 DIAGNOSIS — F41.1 GAD (GENERALIZED ANXIETY DISORDER): ICD-10-CM

## 2024-01-09 DIAGNOSIS — Z12.4 SCREENING FOR CERVICAL CANCER: ICD-10-CM

## 2024-01-09 PROBLEM — F33.2 MDD (MAJOR DEPRESSIVE DISORDER), RECURRENT SEVERE, WITHOUT PSYCHOSIS (HCC): Status: ACTIVE | Noted: 2024-01-09

## 2024-01-09 PROBLEM — F60.3 BORDERLINE PERSONALITY DISORDER (HCC): Status: ACTIVE | Noted: 2024-01-09

## 2024-01-09 PROBLEM — F33.1 MAJOR DEPRESSIVE DISORDER, RECURRENT EPISODE, MODERATE (HCC): Status: RESOLVED | Noted: 2019-09-24 | Resolved: 2024-01-09

## 2024-01-09 LAB
ALT SERPL-CCNC: 30 U/L
ANION GAP SERPL CALC-SCNC: 2 MMOL/L (ref 0–18)
AST SERPL-CCNC: 20 U/L (ref 15–37)
BASOPHILS # BLD AUTO: 0.04 X10(3) UL (ref 0–0.2)
BASOPHILS NFR BLD AUTO: 0.8 %
BUN BLD-MCNC: 18 MG/DL (ref 9–23)
CALCIUM BLD-MCNC: 8.9 MG/DL (ref 8.5–10.1)
CHLORIDE SERPL-SCNC: 106 MMOL/L (ref 98–112)
CHOLEST SERPL-MCNC: 266 MG/DL (ref ?–200)
CO2 SERPL-SCNC: 28 MMOL/L (ref 21–32)
CREAT BLD-MCNC: 0.78 MG/DL
DEPRECATED HBV CORE AB SER IA-ACNC: 17.2 NG/ML
EGFRCR SERPLBLD CKD-EPI 2021: 91 ML/MIN/1.73M2 (ref 60–?)
EOSINOPHIL # BLD AUTO: 0.09 X10(3) UL (ref 0–0.7)
EOSINOPHIL NFR BLD AUTO: 1.9 %
ERYTHROCYTE [DISTWIDTH] IN BLOOD BY AUTOMATED COUNT: 13.7 %
FASTING PATIENT LIPID ANSWER: NO
FASTING STATUS PATIENT QL REPORTED: NO
GLUCOSE BLD-MCNC: 96 MG/DL (ref 70–99)
HCT VFR BLD AUTO: 40.1 %
HDLC SERPL-MCNC: 90 MG/DL (ref 40–59)
HGB BLD-MCNC: 13.3 G/DL
IMM GRANULOCYTES # BLD AUTO: 0.01 X10(3) UL (ref 0–1)
IMM GRANULOCYTES NFR BLD: 0.2 %
LDLC SERPL CALC-MCNC: 168 MG/DL (ref ?–100)
LYMPHOCYTES # BLD AUTO: 1.45 X10(3) UL (ref 1–4)
LYMPHOCYTES NFR BLD AUTO: 30.5 %
MCH RBC QN AUTO: 32.9 PG (ref 26–34)
MCHC RBC AUTO-ENTMCNC: 33.2 G/DL (ref 31–37)
MCV RBC AUTO: 99.3 FL
MONOCYTES # BLD AUTO: 0.48 X10(3) UL (ref 0.1–1)
MONOCYTES NFR BLD AUTO: 10.1 %
NEUTROPHILS # BLD AUTO: 2.68 X10 (3) UL (ref 1.5–7.7)
NEUTROPHILS # BLD AUTO: 2.68 X10(3) UL (ref 1.5–7.7)
NEUTROPHILS NFR BLD AUTO: 56.5 %
NONHDLC SERPL-MCNC: 176 MG/DL (ref ?–130)
OSMOLALITY SERPL CALC.SUM OF ELEC: 284 MOSM/KG (ref 275–295)
PLATELET # BLD AUTO: 262 10(3)UL (ref 150–450)
POTASSIUM SERPL-SCNC: 4.1 MMOL/L (ref 3.5–5.1)
RBC # BLD AUTO: 4.04 X10(6)UL
SODIUM SERPL-SCNC: 136 MMOL/L (ref 136–145)
TRIGL SERPL-MCNC: 55 MG/DL (ref 30–149)
TSI SER-ACNC: 0.6 MIU/ML (ref 0.36–3.74)
VLDLC SERPL CALC-MCNC: 11 MG/DL (ref 0–30)
WBC # BLD AUTO: 4.8 X10(3) UL (ref 4–11)

## 2024-01-09 PROCEDURE — 85025 COMPLETE CBC W/AUTO DIFF WBC: CPT

## 2024-01-09 PROCEDURE — 3008F BODY MASS INDEX DOCD: CPT | Performed by: INTERNAL MEDICINE

## 2024-01-09 PROCEDURE — 80048 BASIC METABOLIC PNL TOTAL CA: CPT

## 2024-01-09 PROCEDURE — 88175 CYTOPATH C/V AUTO FLUID REDO: CPT | Performed by: INTERNAL MEDICINE

## 2024-01-09 PROCEDURE — 3074F SYST BP LT 130 MM HG: CPT | Performed by: INTERNAL MEDICINE

## 2024-01-09 PROCEDURE — 84443 ASSAY THYROID STIM HORMONE: CPT

## 2024-01-09 PROCEDURE — 99396 PREV VISIT EST AGE 40-64: CPT | Performed by: INTERNAL MEDICINE

## 2024-01-09 PROCEDURE — 3078F DIAST BP <80 MM HG: CPT | Performed by: INTERNAL MEDICINE

## 2024-01-09 PROCEDURE — 84450 TRANSFERASE (AST) (SGOT): CPT

## 2024-01-09 PROCEDURE — 84460 ALANINE AMINO (ALT) (SGPT): CPT

## 2024-01-09 PROCEDURE — 80061 LIPID PANEL: CPT

## 2024-01-09 PROCEDURE — 82728 ASSAY OF FERRITIN: CPT

## 2024-01-09 NOTE — PROGRESS NOTES
Patient Office Visit    ASSESSMENT AND PLAN:   1. Routine physical examination  Note: Continue to exercise at least 150 minutes a week and Eat a plant based diet. Please take 2000 IU of vitamin D daily   - ALT(SGPT); Future  - AST (SGOT); Future  - Basic Metabolic Panel (8); Future  - CBC With Differential With Platelet; Future  - Lipid Panel; Future  - TSH W Reflex To Free T4; Future  - Ferritin [E]; Future    2. PAUL (generalized anxiety disorder)  Note: Continue Wellbutrin    3. Alopecia  Note: Off Aldactone.  Continue with vitamin D and iron supplement to see if that helps  - TSH W Reflex To Free T4; Future  - Ferritin [E]; Future    4. Screening for cervical cancer  - ThinPrep PAP with HPV Reflex Request B; Future  - ThinPrep PAP with HPV Reflex Request B    5. History of breast cancer  Note: Continue follow-up with surgery    6. Primary insomnia  Note: On mirtazapine    Return to clinic yearly for routine physical      Patient/Caregiver Education: Patient/Caregiver Education: There are no barriers to learning. Medical education done. Outcome: Patient verbalizes understanding. Patient is notified to call with any questions, complications, allergies, or worsening or changing symptoms.  Patient is to call with any side effects or complications from the treatments as a result of today.      Reviewed Past Medical History and   Patient Active Problem List   Diagnosis    PAUL (generalized anxiety disorder)    History of reconstruction of both breasts    Acquired absence of bilateral breasts and nipples    Mass of upper inner quadrant of right breast    History of breast cancer    Alopecia       Orders Placed This Encounter   Procedures    ALT(SGPT)     Standing Status:   Future     Number of Occurrences:   1     Standing Expiration Date:   1/9/2025    AST (SGOT)     Standing Status:   Future     Number of Occurrences:   1     Standing Expiration Date:   1/9/2025    Basic Metabolic Panel (8)     Standing Status:    Future     Number of Occurrences:   1     Standing Expiration Date:   1/9/2025    CBC With Differential With Platelet     Standing Status:   Future     Number of Occurrences:   1     Standing Expiration Date:   1/9/2025    Lipid Panel     Standing Status:   Future     Number of Occurrences:   1     Standing Expiration Date:   1/9/2025    TSH W Reflex To Free T4     Standing Status:   Future     Number of Occurrences:   1     Standing Expiration Date:   1/9/2025    Ferritin [E]     Standing Status:   Future     Number of Occurrences:   1     Standing Expiration Date:   1/9/2025     Order Specific Question:   Release to patient     Answer:   Immediate     Requested Prescriptions      No prescriptions requested or ordered in this encounter         Vera Fleming DO  CC:  Chief Complaint   Patient presents with    CPX         HPI:   Asuncion Meier is a 53 year old female who presents for a physical    Alopecia: off aldactone as it did not help and may have been causing low libido. Has add an iron supplement and vitamin D supplement to see if that helps  PAUL: stable on medication    Past Medical History:   Diagnosis Date    Abdominal hernia 2005    Anxiety state, unspecified     Back pain 2015    Breast cancer (HCC)     right breast- diagnosed 5/2016, bilateral mastectomy    Cancer (HCC) June 2016    Recurring now    COVID 03/2022    No hospitalization. Had HA,congestion, fatigue & cough    Decorative tattoo 2011    Depression     Dizziness 2021    Exposure to medical diagnostic radiation     Feeling lonely 1995    Headache disorder 2020    Heart palpitations 2003    Hemorrhoids 2005    History of depression 1995    Low back pain     Sleep disturbance 2020    Stress 2016    Visual impairment     glasses       Past Surgical History:   Procedure Laterality Date    COLONOSCOPY  2021    At 51    HERNIA SURGERY      umbilical hernia surgery again a few months ago    IMPLANT LEFT      2016 removal of expanders and  implants placed    IMPLANT RIGHT      MASTECTOMY LEFT      MASTECTOMY RIGHT        ,    OTHER  ORAL SURGERY    OTHER SURGICAL HISTORY Right 2016    Procedure: BREAST SENTINEL LYMPH NODE BIOPSY;  Surgeon: Nicho Zhang MD;  Location:  [facility] OR    OTHER SURGICAL HISTORY Bilateral 2016    Procedure: BREAST RECONSTRUCTION/ IMMEDIATE/EXPANDER;  Surgeon: Sundar Hawk MD;  Location:  [facility] OR    OTHER SURGICAL HISTORY Bilateral     implants exchange    OTHER SURGICAL HISTORY Right     mass removed after mastectomy    UMBILICAL HERNIA REPAIR  2014    Procedure: HERNIA UMBILICAL REPAIR ADULT;  Surgeon: Joan Parra MD;  Location:  [facility] OR       Social History:  Social History     Socioeconomic History    Marital status:     Number of children: 2   Tobacco Use    Smoking status: Former     Packs/day: 0.00     Years: 0.00     Additional pack years: 0.00     Total pack years: 0.00     Types: Cigarettes     Quit date: 1995     Years since quittin.3    Smokeless tobacco: Never    Tobacco comments:     social smoker in college for a few years   Vaping Use    Vaping Use: Never used   Substance and Sexual Activity    Alcohol use: Yes     Alcohol/week: 5.0 standard drinks of alcohol     Types: 5 Glasses of wine per week     Comment: 5 drinks/week    Drug use: Not Currently     Types: Cannabis     Comment: smoking 2 times per week    Sexual activity: Yes     Partners: Male   Other Topics Concern    Caffeine Concern No    Exercise No    Seat Belt No    Special Diet No    Stress Concern Yes    Weight Concern No   Social History Narrative    , lives with  and 2 dtrs     Family History:  Family History   Problem Relation Age of Onset    Lipids Father     Heart Disorder Maternal Grandmother         CHF at age 95    Other (Other) Maternal Grandmother     Heart Attack Maternal Grandfather         76 yrs    Other (Other) Paternal Grandmother     Other (Other)  Paternal Grandfather     Cancer Maternal Uncle         Prostate, agent orange    Anxiety Maternal Uncle     Stroke Maternal Uncle         40’s     Allergies:  No Known Allergies  Current Meds:  Current Outpatient Medications on File Prior to Visit   Medication Sig Dispense Refill    MIRTAZAPINE 7.5 MG Oral Tab TAKE 1 TABLET(7.5 MG) BY MOUTH EVERY NIGHT 30 tablet 0    buPROPion 100 MG Oral Tab Take 1 tablet (100 mg total) by mouth daily. 30 tablet 1    ALPRAZolam 0.5 MG Oral Tab Take 1 tablet (0.5 mg total) by mouth daily as needed. 30 tablet 0    FOLIC ACID OR Take by mouth daily.      Cholecalciferol (VITAMIN D-3 OR) Take 5,000 Int'l Units/day by mouth daily.      Magnesium 500 MG Oral Tab Take by mouth daily.      Ascorbic Acid (VITAMIN C) 100 MG Oral Tab Take 1 tablet (100 mg total) by mouth daily.      IRON OR every other day.      B COMPLEX VITAMINS OR Take by mouth daily.       No current facility-administered medications on file prior to visit.         REVIEW OF SYSTEMS   Constitutional: no fatigue normal energy no weight changes   HENT: normal sinuses and no mouth issues   Eyes: . normal vision no eye pain   Respiratory: normal respirations no cough   Cardiovascular: no CP, or palpitations   Gastrointestinal: normal bowels and no abd pains   Genitourinary:  normal urination no hematuria, no frequency   Musculoskeletal: no pains in arms/legs, normal range of motion   Skin: no rashes or skin lesions that are new   Neurological:  no weakness, no numbness, normal gait   Hematological:  no bruises or bleeding   Psychiatric/Behavioral: normal mood no anxiety normal behavior     /74 (BP Location: Left arm, Patient Position: Sitting, Cuff Size: adult)   Pulse 58   Temp 98 °F (36.7 °C) (Temporal)   Resp 16   Ht 5' 8\" (1.727 m)   Wt 150 lb (68 kg)   LMP 12/06/2023 (Approximate)   SpO2 97%   BMI 22.81 kg/m²     PHYSICAL EXAM:   Constitutional: Vital signs reviewed as noted, well developed, in no acute  distress.   HENT: NCAT, bilateral ear canal and tympanic membrane appear normal  Eyes: pupils reactive bilaterally  Neck: No thyroidmegaly  Cardiovascular: nl s1 s2 no m/r/g  Pulmonary/Chest: CTA bilaterally with no wheezes  Abdominal: Soft NT normal Bowel sounds  : normal external genitalia without skin lesions or rashes, normal cervix, no lesions, no abnormal discharge  Extremities: no pedal edema   Neurological:  no weakness in UE and LE, reflexes are normal  Skin: no rashes or bruises on visualized skin, not undressed   Psychiatric:normal mood

## 2024-01-10 ENCOUNTER — OFFICE VISIT (OUTPATIENT)
Dept: SURGERY | Facility: CLINIC | Age: 54
End: 2024-01-10
Payer: COMMERCIAL

## 2024-01-10 DIAGNOSIS — R79.0 LOW FERRITIN: Primary | ICD-10-CM

## 2024-01-10 DIAGNOSIS — Z90.13 ACQUIRED ABSENCE OF BILATERAL BREASTS AND NIPPLES: Primary | ICD-10-CM

## 2024-01-10 PROCEDURE — 99024 POSTOP FOLLOW-UP VISIT: CPT | Performed by: SURGERY

## 2024-01-10 NOTE — PROGRESS NOTES
Asuncion Meier is a 53 year old female who presents today for a follow-up after right breast implant removal and capsulectomy, right pectoralis major muscle flap repositioning and change into prepectoral pocket, placement of new silicone implant (Allergan Natrelle Inspira style SSX Soft Touch 545 mL), placement of ADM on 10/24/2023. She denies fever and chills. She denies nausea, vomiting, diarrhea or constipation. Her pain is controlled.      Physical Exam     Breasts: Bilateral breast incisions well-healed.  Overall good shape and symmetry.  No erythema bilaterally.  No hematoma or seroma bilaterally.    There were no vitals filed for this visit.      Assessment and Plan     Asuncion Meier is doing well s/p right breast implant removal and capsulectomy, right pectoralis major muscle flap repositioning and change into prepectoral pocket, placement of new silicone implant (Allergan Natrelle Inspira style SSX Soft Touch 545 mL), placement of ADM on 10/24/2023     She is overall doing well.  She will continue her PT, acupuncture and yoga.  She will follow-up in 1 year for annual follow-up.  At that visit we will order an ultrasound to assess for implant rupture.    Questions were answered. Patient understands.

## 2024-01-12 ENCOUNTER — TELEPHONE (OUTPATIENT)
Dept: HEMATOLOGY/ONCOLOGY | Facility: HOSPITAL | Age: 54
End: 2024-01-12

## 2024-01-12 ENCOUNTER — PATIENT MESSAGE (OUTPATIENT)
Dept: INTERNAL MEDICINE CLINIC | Facility: CLINIC | Age: 54
End: 2024-01-12

## 2024-01-12 DIAGNOSIS — R79.0 LOW FERRITIN: ICD-10-CM

## 2024-01-12 DIAGNOSIS — E03.8 SUBCLINICAL HYPOTHYROIDISM: Primary | ICD-10-CM

## 2024-01-12 DIAGNOSIS — L65.9 HAIR LOSS: Primary | ICD-10-CM

## 2024-01-12 NOTE — TELEPHONE ENCOUNTER
Patient called to see if she can switch from Dr Licona to Dr Heath.  Would you be willing to release this patient? Please advise.  Thank you.

## 2024-01-12 NOTE — TELEPHONE ENCOUNTER
From: Asuncion Meier  To: Vera Fleming  Sent: 1/12/2024 10:38 AM CST  Subject: Test results    Hi Dr Fleming,    I have been taking iron supplements, but it seems that my level is lower than it was last time even. Is there anything else I can do about that? Also, I noticed that my thyroid level is on the low end of normal. I do take a supplement for my hair called Nutrafol, and it does have biotin in it. I’m not sure if that skewed my results or not. What do you think? Should I stop taking it and retest to get a more accurate read? I am hoping to get my levels where they should be as soon as possible. If you can offer any additional advice on how to do that I would appreciate it. I have been doing some research and it suggests that iron levels should be at least 100 to help with hair loss.     Thank you,  Asuncion Meier

## 2024-01-14 LAB
.: NORMAL
.: NORMAL

## 2024-01-15 ENCOUNTER — TELEPHONE (OUTPATIENT)
Dept: HEMATOLOGY/ONCOLOGY | Facility: HOSPITAL | Age: 54
End: 2024-01-15

## 2024-01-15 NOTE — TELEPHONE ENCOUNTER
Patient transferring care to Dr. Heath in office. Patient called to schedule follow up appointment. Left message to please call [facility] [phone] to schedule appointment with Dr. Heath.

## 2024-01-15 NOTE — TELEPHONE ENCOUNTER
MIGUEL to schedule a transfer of care to Dr. Heath. Please give the call to Jodi, Called 1/15/24.

## 2024-01-16 ENCOUNTER — TELEPHONE (OUTPATIENT)
Dept: HEMATOLOGY/ONCOLOGY | Facility: HOSPITAL | Age: 54
End: 2024-01-16

## 2024-01-16 NOTE — TELEPHONE ENCOUNTER
MIGUEL to schedule an appointment with this Patient to transfer care to Dr. Heath. Jodi have dates from Brandee to schedule her, Called 1/16/24 #2. gr

## 2024-01-29 ENCOUNTER — LAB ENCOUNTER (OUTPATIENT)
Dept: LAB | Facility: HOSPITAL | Age: 54
End: 2024-01-29
Attending: INTERNAL MEDICINE
Payer: COMMERCIAL

## 2024-01-29 DIAGNOSIS — L65.9 HAIR LOSS: ICD-10-CM

## 2024-01-29 DIAGNOSIS — E03.8 SUBCLINICAL HYPOTHYROIDISM: ICD-10-CM

## 2024-01-29 DIAGNOSIS — R79.0 LOW FERRITIN: ICD-10-CM

## 2024-01-29 LAB
BASOPHILS # BLD AUTO: 0.03 X10(3) UL (ref 0–0.2)
BASOPHILS NFR BLD AUTO: 1 %
DEPRECATED HBV CORE AB SER IA-ACNC: 34.8 NG/ML
EOSINOPHIL # BLD AUTO: 0.06 X10(3) UL (ref 0–0.7)
EOSINOPHIL NFR BLD AUTO: 1.9 %
ERYTHROCYTE [DISTWIDTH] IN BLOOD BY AUTOMATED COUNT: 12.7 %
FOLATE SERPL-MCNC: 63 NG/ML (ref 8.7–?)
HCT VFR BLD AUTO: 40.8 %
HGB BLD-MCNC: 14 G/DL
IMM GRANULOCYTES # BLD AUTO: 0.01 X10(3) UL (ref 0–1)
IMM GRANULOCYTES NFR BLD: 0.3 %
LYMPHOCYTES # BLD AUTO: 0.89 X10(3) UL (ref 1–4)
LYMPHOCYTES NFR BLD AUTO: 28.8 %
MAGNESIUM SERPL-MCNC: 2.5 MG/DL (ref 1.6–2.6)
MCH RBC QN AUTO: 32.8 PG (ref 26–34)
MCHC RBC AUTO-ENTMCNC: 34.3 G/DL (ref 31–37)
MCV RBC AUTO: 95.6 FL
MONOCYTES # BLD AUTO: 0.23 X10(3) UL (ref 0.1–1)
MONOCYTES NFR BLD AUTO: 7.4 %
NEUTROPHILS # BLD AUTO: 1.87 X10 (3) UL (ref 1.5–7.7)
NEUTROPHILS # BLD AUTO: 1.87 X10(3) UL (ref 1.5–7.7)
NEUTROPHILS NFR BLD AUTO: 60.6 %
PLATELET # BLD AUTO: 243 10(3)UL (ref 150–450)
RBC # BLD AUTO: 4.27 X10(6)UL
TSI SER-ACNC: 0.9 MIU/ML (ref 0.36–3.74)
VIT B12 SERPL-MCNC: 700 PG/ML (ref 193–986)
VIT D+METAB SERPL-MCNC: 51 NG/ML (ref 30–100)
WBC # BLD AUTO: 3.1 X10(3) UL (ref 4–11)

## 2024-01-29 PROCEDURE — 84591 ASSAY OF NOS VITAMIN: CPT

## 2024-01-29 PROCEDURE — 84443 ASSAY THYROID STIM HORMONE: CPT

## 2024-01-29 PROCEDURE — 82728 ASSAY OF FERRITIN: CPT

## 2024-01-29 PROCEDURE — 83735 ASSAY OF MAGNESIUM: CPT

## 2024-01-29 PROCEDURE — 82746 ASSAY OF FOLIC ACID SERUM: CPT

## 2024-01-29 PROCEDURE — 85025 COMPLETE CBC W/AUTO DIFF WBC: CPT

## 2024-01-29 PROCEDURE — 84630 ASSAY OF ZINC: CPT

## 2024-01-29 PROCEDURE — 82306 VITAMIN D 25 HYDROXY: CPT

## 2024-01-29 PROCEDURE — 82607 VITAMIN B-12: CPT

## 2024-01-29 PROCEDURE — 36415 COLL VENOUS BLD VENIPUNCTURE: CPT

## 2024-02-02 LAB — ZINC: 71 UG/DL

## 2024-02-03 LAB — VITAMIN B7: 0.15 NG/ML

## 2024-02-13 ENCOUNTER — OFFICE VISIT (OUTPATIENT)
Dept: HEMATOLOGY/ONCOLOGY | Facility: HOSPITAL | Age: 54
End: 2024-02-13
Attending: INTERNAL MEDICINE
Payer: COMMERCIAL

## 2024-02-13 VITALS
BODY MASS INDEX: 22.08 KG/M2 | WEIGHT: 144 LBS | HEART RATE: 75 BPM | OXYGEN SATURATION: 99 % | RESPIRATION RATE: 16 BRPM | TEMPERATURE: 97 F | HEIGHT: 67.76 IN | DIASTOLIC BLOOD PRESSURE: 73 MMHG | SYSTOLIC BLOOD PRESSURE: 130 MMHG

## 2024-02-13 DIAGNOSIS — Z85.3 ENCOUNTER FOR FOLLOW-UP SURVEILLANCE OF BREAST CANCER: ICD-10-CM

## 2024-02-13 DIAGNOSIS — Z08 ENCOUNTER FOR FOLLOW-UP SURVEILLANCE OF BREAST CANCER: ICD-10-CM

## 2024-02-13 DIAGNOSIS — R79.0 LOW FERRITIN: ICD-10-CM

## 2024-02-13 DIAGNOSIS — C50.811 MALIGNANT NEOPLASM OF OVERLAPPING SITES OF RIGHT BREAST IN FEMALE, ESTROGEN RECEPTOR POSITIVE (HCC): Primary | ICD-10-CM

## 2024-02-13 DIAGNOSIS — Z17.0 MALIGNANT NEOPLASM OF OVERLAPPING SITES OF RIGHT BREAST IN FEMALE, ESTROGEN RECEPTOR POSITIVE (HCC): Primary | ICD-10-CM

## 2024-02-13 PROCEDURE — 99213 OFFICE O/P EST LOW 20 MIN: CPT | Performed by: INTERNAL MEDICINE

## 2024-02-13 NOTE — PROGRESS NOTES
ALEJANDRO Meier is a 53 year old female here for follow up of Malignant neoplasm of overlapping sites of right breast in female, estrogen receptor positive  (HCC)    Encounter for follow-up surveillance of breast cancer.    She had initially presented with a routine screening mammogram with new abnormalities in the right breast. As a result, on 05/31/2016 she underwent ultrasound guided biopsy of two sites within the right breast. Both sites showed grade 2 invasive ductal carcinoma with associated ductal carcinoma-in-situ. Both were estrogen receptor 100% positive and Her2/frida negative by FISH. One biopsy was progesterone receptor 100% positive and the other was 80% positive. The sample that was progesterone receptor 100% positive had a Ki-67 of 15% while the other had a Ki-67 of 80%.      On 06/29/2016 she underwent right skin sparring mastectomy with sentinel lymph node biopsy and left nipple sparring prophylactic mastectomy. Pathology from the left mastectomy showed no evidence of malignancy. Pathology from the right breast showed multiple foci of grade 2 invasive ductal carcinoma and 3 of 3 sentinel lymph nodes negative for metastasis. Repeat Her2/frida was negative. A focus of DCIS and invasive disease was less than 1mm from the deep surgical margin. OncotypeDX was performed on her tumor and showed a low recurrence score of 12. Recommendations were made for adjuvant endocrine therapy.      Patient underwent genetic testing using the [employer] High/Moderate panel. No deleterious mutations were found in GABBY, BRCA1, CDH1, CHEK2, PALB2, PTEN, STK11, and TP53. A variant of uncertain significant was identified in BRCA2, c.[id number]>T     Patient was premenopausal at diagnosis.     In 08/2016 she began endocrine therapy with tamoxifen. Treatment was discontinued at the beginning of 12/2016 due to severe depression that began with the start of tamoxifen.      Patient failed to follow up between 07/2017 and 01/2019.       On 05/19/2021 she was seen by plastic surgery as follow up to her revision surgery. She informed Dr. Monteiro of a small nodule in the medial aspect of the right breast which she noticed in approximately 04/2021. On 06/10/2021 she underwent limited ultrasound of the right breast which showed a 0.8 x 0.4 cm abnormality at the 3 o'clock position. Biopsy was performed on 06/23/2021 which showed grade 2 invasive ductal carcinoma with biopsy size 1.1 cm. The tumor in the biopsy did not appear to invade skeletal muscle. Immunohistochemical studies showed the following: estrogen receptor 100% positive (strong), progesterone receptor 100% positive (strong), Her2 1+ (negative), and Ki67 5%.     On 07/06/2021 she underwent surgical resection. Pathology showed a grade2, 1.1 cm invasive mixed ductal and lobular carcinoma involving the posterior margin with a 1 mm span with associated ductal carcinoma-in-situ.     From 08/25/2021 to 10/14/2021 she received radiation therapy. Patient decided to hold off on endocrine therapy until after the completion of radiation therapy.      She began tamoxifen during the second week of 12/2021. Patient self discontinued therapy in 09/2022 due to toxicities including depression, hair thinning, and insomnia.      Last OPV with Dr. Licona on 11/07/2022.  At that time, she had a new self detected palpable mass.  She reports that she noticed this a few weeks ago.  Secondary to this new concerns she was referred for an ultrasound.  She had a previous textured based implant reconstruction with swap out to smooth implants.  She was following with Dr. Monteiro for concerns related to cosmetic outcome.  A right breast ultrasound performed on [place] 10, 2021 confirmed an 8 x 4 mm region of concern at her palpable finding of 3:00, 10 cm from the nipple.  Biopsy was performed June 23, 2021 and confirmed invasive ductal carcinoma, grade 2 measuring up to 1.1 cm, ER/%, HER-2/frida negative, Ki-67 5%.   Review of prior pathology demonstrated multiple close margins of less than 1 mm of DCIS.  Patient has not been compliant with endocrine therapy secondary to systemic side effects. She was found to have recurrent disease of the right chest wall and was recommended for a lumpectomy which took place without complication. She denies any new concerns to either chest since her last visit.  She tolerated radiation without sequelae and had to stop her tamoxifen due to depressive symptoms.  She has had significant anxiety since she had the chest wall recurrence.  She is admittedly unhappy with her reconstruction given the asymmetry with regard to the radiated breast compared to the contralateral breast.  She feels a ridge of tissue on the superior central right chest wall which she is concerned about.  She had a right breast complete ultrasound on June 1, 2023 that demonstrated no concerning findings in this area.  She had had an MRI in January 2023 to rule out any concerning findings that demonstrated postsurgical changes consistent with bilateral mastectomies without any additional concerns.  She had repeat MRI of the reconstructions in Sept 2023 and US of the R reconstruction in November of 2023 and there were no suspicious findings.  US recommended for 6 months to evaluate implant.  She follows for the imaging of implants with Dr. Monteiro.    Perimenopausal, LMP last Friday, prior to that 2 months ago.     She is concerned about hair thinning due to low ferritin.  States that for hair loss that she was told it has to be in the 80's.      States that she has fatigue and palpitations.  Also some dizziness.     She follows with her PCP, once a year.  She is following for depression with another physician and follows with a therapist.     She is very tearful.  Lots of stress in her life right now.      Worried about WBC being low.     She is here hoping to get an iron infusion due to hair loss.  States she has taken iron  supplement that is liquid.      Not interested in resuming tamoxifen due hair loss.     No changes on chest wall exam.  Recent US of the R chest/implant of palpable area that was negative.  R feels like a ridge on the implant underneath and on the side.        Review of Systems:     Review of Systems   Constitutional:  Positive for appetite change and fatigue. Negative for unexpected weight change.   HENT:   Positive for hearing loss (L>R) and tinnitus.    Respiratory:  Negative for cough and shortness of breath.    Cardiovascular:  Negative for chest pain.   Gastrointestinal:  Negative for abdominal pain.   Endocrine: Positive for hot flashes (mild).   Genitourinary:  Positive for menstrual problem (very heavy and now started to be irregular).    Musculoskeletal:  Negative for arthralgias and back pain.        No bone pain   Skin:         Hair loss   Neurological:  Positive for dizziness (occasional orthostasis). Negative for headaches.   Hematological:  Negative for adenopathy.   Psychiatric/Behavioral:  Positive for depression and sleep disturbance. The patient is nervous/anxious.          Current Outpatient Medications   Medication Sig Dispense Refill    LAMOTRIGINE 25 MG Oral Tab TAKE 2 TABLETS(50 MG) BY MOUTH DAILY 60 tablet 0    buPROPion 100 MG Oral Tab Take 1 tablet (100 mg total) by mouth daily. 30 tablet 0    mirtazapine 7.5 MG Oral Tab Take 1 tablet (7.5 mg total) by mouth nightly. 30 tablet 0    ALPRAZolam 0.5 MG Oral Tab Take 1 tablet (0.5 mg total) by mouth daily as needed. 30 tablet 0    FOLIC ACID OR Take by mouth daily.      Cholecalciferol (VITAMIN D-3 OR) Take 5,000 Int'l Units/day by mouth daily.      Magnesium 500 MG Oral Tab Take by mouth daily.      Ascorbic Acid (VITAMIN C) 100 MG Oral Tab Take 1 tablet (100 mg total) by mouth daily.      IRON OR every other day.      B COMPLEX VITAMINS OR Take by mouth daily.       Allergies:   No Known Allergies    Past Medical History:   Diagnosis Date     Abdominal hernia     Anxiety state, unspecified     Back pain     Breast cancer (HCC)     right breast- diagnosed 2016, bilateral mastectomy    Cancer (HCC) 2016    Recurring now    COVID 2022    No hospitalization. Had HA,congestion, fatigue & cough    Decorative tattoo     Depression     Dizziness     Exposure to medical diagnostic radiation     Feeling lonely     Headache disorder     Heart palpitations     Hemorrhoids     History of depression     Low back pain     Sleep disturbance     Stress 2016    Visual impairment     glasses     Past Surgical History:   Procedure Laterality Date    COLONOSCOPY      At 51    HERNIA SURGERY      umbilical hernia surgery again a few months ago    IMPLANT LEFT      2016 removal of expanders and implants placed    IMPLANT RIGHT      MASTECTOMY LEFT      MASTECTOMY RIGHT        ,    OTHER  ORAL SURGERY    OTHER SURGICAL HISTORY Right 2016    Procedure: BREAST SENTINEL LYMPH NODE BIOPSY;  Surgeon: Nicho Zhang MD;  Location:  [facility] OR    OTHER SURGICAL HISTORY Bilateral 2016    Procedure: BREAST RECONSTRUCTION/ IMMEDIATE/EXPANDER;  Surgeon: Sundar Hawk MD;  Location:  [facility] OR    OTHER SURGICAL HISTORY Bilateral     implants exchange    OTHER SURGICAL HISTORY Right     mass removed after mastectomy    UMBILICAL HERNIA REPAIR  2014    Procedure: HERNIA UMBILICAL REPAIR ADULT;  Surgeon: Joan Parra MD;  Location:  [facility] OR     Social History     Socioeconomic History    Marital status:     Number of children: 2   Tobacco Use    Smoking status: Former     Packs/day: 0.00     Years: 0.00     Additional pack years: 0.00     Total pack years: 0.00     Types: Cigarettes     Quit date: 1995     Years since quittin.4    Smokeless tobacco: Never    Tobacco comments:     social smoker in college for a few years   Vaping Use    Vaping Use: Never used   Substance and Sexual  Activity    Alcohol use: Yes     Alcohol/week: 5.0 standard drinks of alcohol     Types: 5 Glasses of wine per week     Comment: 5 drinks/week    Drug use: Not Currently     Types: Cannabis     Comment: smoking 2 times per week    Sexual activity: Yes     Partners: Male   Other Topics Concern    Caffeine Concern No    Exercise No    Seat Belt No    Special Diet No    Stress Concern Yes    Weight Concern No   Social History Narrative    , lives with  and 2 dtrs         Family History   Problem Relation Age of Onset    Lipids Father     Heart Disorder Maternal Grandmother         CHF at age 95    Other (Other) Maternal Grandmother     Heart Attack Maternal Grandfather         76 yrs    Other (Other) Paternal Grandmother     Other (Other) Paternal Grandfather     Cancer Maternal Uncle         Prostate, agent orange    Anxiety Maternal Uncle     Stroke Maternal Uncle         40’s         PHYSICAL EXAM:    /73 (BP Location: Left arm, Patient Position: Sitting, Cuff Size: adult)   Pulse 75   Temp 97.4 °F (36.3 °C) (Oral)   Resp 16   Ht 1.721 m (5' 7.76\")   Wt 65.3 kg (144 lb)   LMP 12/06/2023 (Approximate)   SpO2 99%   BMI 22.05 kg/m²   Wt Readings from Last 6 Encounters:   02/13/24 65.3 kg (144 lb)   01/09/24 68 kg (150 lb)   10/24/23 65.9 kg (145 lb 3.2 oz)   10/19/23 66.8 kg (147 lb 3.2 oz)   08/04/23 67 kg (147 lb 9.6 oz)   02/21/23 62.1 kg (136 lb 12.8 oz)       Physical Exam  General: Patient is alert, not in acute distress.  HEENT: EOMs intact. PERRL. Oropharynx is clear.   Neck: No JVD. No palpable lymphadenopathy. Neck is supple.  Chest: Clear to auscultation.  B mastectomies with implant reconstruction.  R breast with abscence of nipple.  Has lateral upper scar.  RT changes on the R.   Heart: Regular rate and rhythm.   Abdomen: Soft, non tender with good bowel sounds.  Extremities: No edema.  Neurological: Grossly intact.   Lymphatics: There is no palpable lymphadenopathy throughout  in the cervical, supraclavicular, axillary, or inguinal regions.  Psych/Depression: nl        ASSESSMENT/PLAN:     1. Malignant neoplasm of overlapping sites of right breast in female, estrogen receptor positive  (HCC)    2. Encounter for follow-up surveillance of breast cancer      1) Recurrent invasive ductal carcinoma, right breast: Patient with local recurrence of invasive ductal carcinoma involving the chest wall. She has undergone surgical resection followed by radiation therapy.      Patient self discontinued tamoxifen in 09/2022 due to toxicities.  D/w the patient that recommendation for tamoxifen was for decreasing risk of distant recurrence in the future.  D/w patient data published regarding compliance with adjuvant hormonal therapy that women who stopped taking hormonal therapy early were 35% to 61% more likely to have a recurrence than women who didn’t stop taking the medicine early.  Discussed that in her situation, not sure if benefit as no outcomes data on patients that have been off treatment for years and then resume treatment.  She does not want to pursue treatment and wants to continue with observation.      F/u every 6 months until 2026, then yearly.     Call for appointment sooner.     Discussed with the patient that on review of her iron studies and CBC, she is not anemic and does not have iron deficiency at this point.  She is not a candidate for IV iron infusions.  Rx sent for po iron for the patient to take daily and will repeat ferritin in the future.      MDM low risk      Orders Placed This Encounter   Procedures    FERRITIN [E]       Results From Past 48 Hours:  No results found for this or any previous visit (from the past 48 hour(s)).        Imaging & Referrals:  None   No orders of the defined types were placed in this encounter.    Component      Latest Ref Rng 6/15/2022 4/3/2023 1/9/2024 1/29/2024   FERRITIN      18.0 - 340.0 ng/mL 30.3  21.0  17.2 (L)  34.8       Component  Ref  Range & Units 1/29/24 1100   WBC  4.0 - 11.0 x10(3) uL 3.1 Low     RBC  3.80 - 5.30 x10(6)uL 4.27    HGB  12.0 - 16.0 g/dL 14.0    HCT  35.0 - 48.0 % 40.8    PLT  150.0 - 450.0 10(3)uL 243.0    MCV  80.0 - 100.0 fL 95.6    MCH  26.0 - 34.0 pg 32.8    MCHC  31.0 - 37.0 g/dL 34.3    RDW  % 12.7    Neutrophil Absolute Prelim  1.50 - 7.70 x10 (3) uL 1.87    Neutrophil Absolute  1.50 - 7.70 x10(3) uL 1.87    Lymphocyte Absolute  1.00 - 4.00 x10(3) uL 0.89 Low     Monocyte Absolute  0.10 - 1.00 x10(3) uL 0.23    Eosinophil Absolute  0.00 - 0.70 x10(3) uL 0.06    Basophil Absolute  0.00 - 0.20 x10(3) uL 0.03    Immature Granulocyte Absolute  0.00 - 1.00 x10(3) uL 0.01    Neutrophil %  % 60.6    Lymphocyte %  % 28.8    Monocyte %  % 7.4    Eosinophil %  % 1.9    Basophil %  % 1.0    Immature Granulocyte %  % 0.3    Resulting Agency [facility] ([facility])              Specimen Collected: 01/29/24 11:00 Last Resulted: 01/29/24 11:24         PROCEDURE:  MRI BREAST (W+WO) W/CAD BILAT (CPT=77049)      COMPARISON:  [facility], MRI BREAST (W+WO) W/CAD BILAT (CPT=77049), 1/17/2023, 7:16 PM.      INDICATIONS:  Z85.3 History of right breast cancer N63.12 Mass of upper inner quadrant of right breast      TECHNIQUE:  The breasts were imaged using dynamic intravenous gadolinium infusion, thin sections, and a dedicated breast coil.  Bilateral pre-contrast axial 2D/3D T2 weighted and 3D T1 weighted VIBRANT sequences were obtained with and without fat   suppression. Post contrast VIBRANT 3D T1 weighted images after IV gadolinium were obtained. 1.6 mm slice reconstruction of 3D data sets with additional maximum intensity projects, subtraction, graphic, and kinetic analysis were performed from source   data.      PATIENT STATED HISTORY:(As transcribed by Technologist)  Mass of upper inner quadrant of right breast.       CONTRAST USED:  14 mL of Dotarem      FINDINGS:  Minimal background parenchymal enhancement is noted.      No suspicious  enhancement is noted in the breast.  The patient is status post bilateral mastectomy with implant reconstruction.  Bilateral implants appear intact.      No suspicious axillary or internal mammary adenopathy is noted.      Evaluation of the anterior chest wall and upper abdomen are within normal limits.                    Impression  CONCLUSION:  No suspicious enhancement is noted in the breast.  Stable postsurgical changes status post bilateral mastectomy with implant reconstruction.      BI-RADS CATEGORY:   DIAGNOSTIC CATEGORY 2--BENIGN FINDING NO CHANGE FROM COMPARISON ASSESSMENT.       RECOMMENDATIONS:    ROUTINE MAMMOGRAM AND CLINICAL EVALUATION IN 12 MONTHS.       PLEASE NOTE:  A NORMAL MRI DOES NOT EXCLUDE THE POSSIBILITY OF BREAST CANCER.  A CLINICALLY SUSPICIOUS PALPABLE LUMP SHOULD BE BIOPSIED.  CORRELATION WITH CLINICAL EXAM AND OTHER IMAGING STUDIES IS RECOMMENDED.         LOCATION:  [facility]         Dictated by (CST): Skip Edouard MD on 9/07/2023 at 9:07 AM       Finalized by (CST): Skip Edouard MD on 9/07/2023 at 9:12 AM     PROCEDURE:  US BREAST RIGHT LIMITED (CPT=76642)      COMPARISON:  [facility], US BREAST RIGHT COMPLETE (CPT=76641), 6/01/2023, 8:33 AM.      INDICATIONS:  Z90.13 Acquired absence of bilateral breasts and nipples.  History of recent capsulectomy and breast implant revision.      TECHNIQUE:  Right breast ultrasound was performed, evaluating specific areas of concern.  The breast was NOT scanned in entirety.       PATIENT STATED HISTORY: (As transcribed by Technologist)           FINDINGS:  Sonography was performed to the region of clinical concern along the 10 o'clock position of the right breast.  The targeted sonogram demonstrates a bandlike area of increased echogenicity along the margin of the breast implant.  The area of   increased echogenicity measures approximately 1.7 x 0.7 x 2.8 cm.  Findings likely reflect edematous tissue adjacent to the implant.  No abscess is  identified.  There is a small lymph node adjacent to the area of edema with normal cortical thickness and   no enlargement.                    Impression  CONCLUSION:  There appears to be some edematous soft tissue adjacent to the implant at the 10 o'clock position of the right breast.  No abscess is identified.  Recommend follow-up sonogram in 6 months to assess stability.  Management of any palpable   abnormality should also be based on clinical grounds.      BI-RADS CATEGORY:   DIAGNOSTIC CATEGORY 3--PROBABLY BENIGN FINDING.       RECOMMENDATIONS:    SHORT TERM FOLLOW-UP ULTRASOUND RIGHT BREAST IN 6 MONTHS.       LOCATION:  [facility]               Dictated by (CST): Corona Vyas MD on 11/21/2023 at 10:21 AM       Finalized by (CST): Corona Vyas MD on 11/21/2023 at 10:27 AM

## 2024-08-06 ENCOUNTER — OFFICE VISIT (OUTPATIENT)
Dept: SURGERY | Facility: CLINIC | Age: 54
End: 2024-08-06
Payer: COMMERCIAL

## 2024-08-06 VITALS
TEMPERATURE: 98 F | HEART RATE: 88 BPM | BODY MASS INDEX: 22 KG/M2 | DIASTOLIC BLOOD PRESSURE: 81 MMHG | OXYGEN SATURATION: 99 % | RESPIRATION RATE: 16 BRPM | WEIGHT: 147.63 LBS | SYSTOLIC BLOOD PRESSURE: 121 MMHG

## 2024-08-06 DIAGNOSIS — C50.911 RECURRENT BREAST CANCER, RIGHT (HCC): ICD-10-CM

## 2024-08-06 DIAGNOSIS — Z85.3 HISTORY OF RIGHT BREAST CANCER: ICD-10-CM

## 2024-08-06 DIAGNOSIS — Z90.13 ACQUIRED ABSENCE OF BILATERAL BREASTS AND NIPPLES: ICD-10-CM

## 2024-08-06 DIAGNOSIS — Z17.0 MALIGNANT NEOPLASM OF OVERLAPPING SITES OF RIGHT BREAST IN FEMALE, ESTROGEN RECEPTOR POSITIVE (HCC): ICD-10-CM

## 2024-08-06 DIAGNOSIS — R92.8 ABNORMAL ULTRASOUND OF BREAST: Primary | ICD-10-CM

## 2024-08-06 DIAGNOSIS — C50.811 MALIGNANT NEOPLASM OF OVERLAPPING SITES OF RIGHT BREAST IN FEMALE, ESTROGEN RECEPTOR POSITIVE (HCC): ICD-10-CM

## 2024-08-06 PROCEDURE — 99213 OFFICE O/P EST LOW 20 MIN: CPT | Performed by: SURGERY

## 2024-08-06 PROCEDURE — 3074F SYST BP LT 130 MM HG: CPT | Performed by: SURGERY

## 2024-08-06 PROCEDURE — 3079F DIAST BP 80-89 MM HG: CPT | Performed by: SURGERY

## 2024-08-08 ENCOUNTER — HOSPITAL ENCOUNTER (OUTPATIENT)
Dept: MAMMOGRAPHY | Facility: HOSPITAL | Age: 54
Discharge: HOME | End: 2024-08-08
Attending: SURGERY
Payer: COMMERCIAL

## 2024-08-08 DIAGNOSIS — R92.8 ABNORMAL ULTRASOUND OF BREAST: ICD-10-CM

## 2024-08-08 PROCEDURE — 76642 ULTRASOUND BREAST LIMITED: CPT | Performed by: SURGERY

## 2024-08-13 ENCOUNTER — APPOINTMENT (OUTPATIENT)
Dept: HEMATOLOGY/ONCOLOGY | Facility: HOSPITAL | Age: 54
End: 2024-08-13
Attending: INTERNAL MEDICINE
Payer: COMMERCIAL

## 2024-08-22 PROBLEM — C50.911 RECURRENT BREAST CANCER, RIGHT (HCC): Status: ACTIVE | Noted: 2024-08-22

## 2024-08-22 NOTE — PROGRESS NOTES
Breast Surgery Surveillance Visit    Diagnosis: Right breast cancer status post bilateral mastectomies in 2016 with right recurrent breast cancer status post lumpectomy on July 6, 2021.    Stage:  Cancer Staging   Malignant neoplasm of overlapping sites of right female breast (HCC)  Staging form: Breast, AJCC V7  - Clinical stage from 7/28/2016: Stage IA (T1b, N0, M0) - Signed by Lam Licona MD on 7/28/2016    Disease Status:  Surgical treatment complete, radiation completed October 2021, tamoxifen stopped secondary to depression symptoms and continues to follow with medical oncology from [facility].    History:   This 54 year old woman presented with a personal history of right breast cancer that was initially treated in 2016 with bilateral mastectomies with new self detected palpable mass.  She reports that she noticed this a few weeks ago.  Secondary to this new concerns she was referred for an ultrasound.  She had a previous textured based implant reconstruction with swap out to smooth implants.  She was following with Dr. Monteiro for concerns related to cosmetic outcome.  A right breast ultrasound performed on [place] 10, 2021 confirmed an 8 x 4 mm region of concern at her palpable finding of 3:00, 10 cm from the nipple.  Biopsy was performed June 23, 2021 and confirmed invasive ductal carcinoma, grade 2 measuring up to 1.1 cm, ER/%, HER-2/frida negative, Ki-67 5%.  Review of prior pathology demonstrated multiple close margins of less than 1 mm of DCIS.  Patient has not been compliant with endocrine therapy secondary to systemic side effects. She was found to have recurrent disease of the right chest wall and was recommended for a lumpectomy which took place without complication. She denies any new concerns to either chest since her last visit.  She tolerated radiation without sequelae and had to stop her tamoxifen due to depressive symptoms. She had had an MRI in January 2023 to rule out any  concerning findings that demonstrated postsurgical changes consistent with bilateral mastectomies without any additional concerns.  There are no new clinical concerns since her last visit.  She is here today for evaluation and recommendations for further therapy.        Past Medical History:    Abdominal hernia    Anxiety state, unspecified    Back pain    Breast cancer (HCC)    right breast- diagnosed 2016, bilateral mastectomy    Cancer (HCC)    Recurring now    COVID    No hospitalization. Had HA,congestion, fatigue & cough    Decorative tattoo    Depression    Dizziness    Exposure to medical diagnostic radiation    Feeling lonely    Headache disorder    Heart palpitations    Hemorrhoids    Hypothyroidism    Sleep disturbance    Stress    Visual impairment    glasses       Past Surgical History:   Procedure Laterality Date    Colonoscopy      At 51    Hernia surgery      umbilical hernia surgery again a few months ago    Implant left      2016 removal of expanders and implants placed    Implant right      Mastectomy left      Mastectomy right        ,    Other  ORAL SURGERY    Other surgical history Right 2016    Procedure: BREAST SENTINEL LYMPH NODE BIOPSY;  Surgeon: Nicho Zhang MD;  Location:  [facility] OR    Other surgical history Bilateral 2016    Procedure: BREAST RECONSTRUCTION/ IMMEDIATE/EXPANDER;  Surgeon: Sundar Hawk MD;  Location:  [facility] OR    Other surgical history Bilateral     implants exchange    Other surgical history Right     mass removed after mastectomy    Umbilical hernia repair  2014    Procedure: HERNIA UMBILICAL REPAIR ADULT;  Surgeon: Joan Parra MD;  Location:  MAIN OR       Gynecological History:  Pt is a   Pt was 31 years old at time of first pregnancy.    She has cumulative breastfeeding history of 26 months.  She achieved menarche at age 14 and LMP 22  She denies any history of hormone replacement therapy  .  She has  history of oral contraceptive use for 26 years, last in 2016.  She has recieved infertility treatment to achieve pregnancy.    Medications:     PATIENT SUPPLIED MEDICATION Take 1 mg by mouth daily. Compounded T3&T4      Misc Natural Products (DIM-PLUS OR) Take 1 mg by mouth daily.      [DISCONTINUED] FLUoxetine (PROZAC) 20 MG Oral Cap Take 1 capsule (20 mg total) by mouth every morning. 30 capsule 0    [] lamoTRIgine 100 MG Oral Tab Take 1 tablet (100 mg total) by mouth daily. 30 tablet 0    [DISCONTINUED] mirtazapine (REMERON) 15 MG Oral Tab Take 1 tablet (15 mg total) by mouth nightly. 30 tablet 0    zinc sulfate 220 (50 Zn) MG Oral Cap Take 1 capsule (220 mg total) by mouth daily.      vitamin E 100 UNITS Oral Cap Take 1 capsule (100 Units total) by mouth daily.      Ferrous Fumarate-Vitamin C ER 65-25 MG Oral Tab CR Take 1 tablet by mouth daily. 90 tablet 1    FOLIC ACID OR Take by mouth daily.      Cholecalciferol (VITAMIN D-3 OR) Take 5,000 Int'l Units/day by mouth daily.      Magnesium 500 MG Oral Tab Take by mouth daily.      Ascorbic Acid (VITAMIN C) 100 MG Oral Tab Take 1 tablet (100 mg total) by mouth daily.      B COMPLEX VITAMINS OR Take by mouth daily.         Allergies:    Allergies   Allergen Reactions    Gluten Flour PAIN       Family History:   Family History   Problem Relation Age of Onset    Lipids Father     Heart Disorder Maternal Grandmother         CHF at age 95    Other (Other) Maternal Grandmother     Heart Attack Maternal Grandfather         76 yrs    Other (Other) Paternal Grandmother     Other (Other) Paternal Grandfather     Cancer Maternal Uncle         Prostate, agent orange    Anxiety Maternal Uncle     Stroke Maternal Uncle         40’s       She is not of Ashkenazi Lutheran ancestry.    Social History:  History   Alcohol Use    5.0 standard drinks of alcohol/week    5 Glasses of wine per week     Comment: 5 drinks/week last drink was wine \"last night\"         History   Smoking  Status    Former    Types: Cigarettes   Smokeless Tobacco    Never     She is . She has 2 daughters. She is a homemaker.     Review of Systems:  General:   The patient denies, fever, chills, night sweats, fatigue, generalized weakness, change in appetite or weight loss.    HEENT:     The patient denies eye irritation, cataracts, redness, glaucoma, yellowing of the eyes, change in vision, color blindness, or+ wearing contacts/glasses. The patient denies hearing loss, ringing in the ears, ear drainage, earaches, nasal congestion, nose bleeds, +snoring, pain in mouth/throat, hoarseness, change in voice, facial trauma.    Respiratory:  The patient denies chronic cough, phlegm, hemoptysis, pleurisy/chest pain, pneumonia, asthma, wheezing, difficulty in breathing with exertion, emphysema, chronic bronchitis, shortness of breath or abnormal sound when breathing.     Cardiovascular:  There is no history of chest pain, chest pressure/discomfort, palpitations, irregular heartbeat, fainting or near-fainting, difficulty breathing when lying flat, SOB/Coughing at night, swelling of the legs or chest pain while walking.    Breasts:  See history of present illness    Gastrointestinal:     There is no history of difficulty or pain with swallowing, reflux symptoms, vomiting, dark or bloody stools, constipation, yellowing of the skin, indigestion, nausea, change in bowel habits, diarrhea, abdominal pain or vomiting blood.     Genitourinary:  The patient denies frequent urination, needing to get up at night to urinate, urinary hesitancy or retaining urine, painful urination, urinary incontinence, decreased urine stream, blood in the urine or vaginal/penile discharge.    Skin:    The patient denies rash, itching, skin lesions, dry skin, change in skin color or change in moles.     Hematologic/Lymphatic:  The patient denies easily bruising or bleeding or persistent swollen glands or lymph nodes.     Musculoskeletal:  The patient  denies muscle aches/pain, joint pain, stiff joints, +neck pain, +back pain or bone pain.    Neuropsychiatric:  There is no history of +migraines or severe headaches, seizure/epilepsy, speech problems, coordination problems, trembling/tremors, fainting/black outs, +dizziness, +memory problems, loss of sensation/numbness, problems walking, weakness, tingling or burning in hands/feet. There is no history of abusive relationship, bipolar disorder, sleep disturbance, +anxiety,+ depression or+ feeling of despair.    Endocrine:    There is no history of poor/slow wound healing, weight loss/gain, fertility or hormone problems, cold intolerance, thyroid disease.     Allergic/Immunologic:  There is no history of hives, hay fever, angioedema or anaphylaxis.    /81 (BP Location: Left arm, Patient Position: Sitting, Cuff Size: adult)   Pulse 88   Temp 98.2 °F (36.8 °C) (Temporal)   Resp 16   Wt 67 kg (147 lb 9.6 oz)   LMP 12/06/2023 (Approximate)   SpO2 99%   BMI 22.44 kg/m²     Physical Exam:  The patient is an alert, oriented, well-nourished and  well-developed woman who appears her stated age. Her speech patterns and movements are normal. Her affect is appropriate.    HEENT: The head is normocephalic. The neck is supple. The thyroid is not enlarged and is without palpable masses/nodules. There are no palpable masses. The trachea is in the midline. Conjunctiva are clear, non-icteric.    Chest: The chest expands symmetrically. The lungs are clear to auscultation.    Heart: The rhythm is regular.  There are no murmurs, rubs, gallops or thrills.    Breasts:  Her breasts are surgically absent with intact implant-based reconstruction. There are no nodules, skin changes or findings noted on physical exam and there is no palpable axillary adenopathy bilaterally.    Abdomen:  The abdomen is soft, flat and non tender. The liver is not enlarged. There are no palpable masses.    Lymph Nodes:  The supraclavicular, axillary  and cervical regions are free of significant lymphadenopathy.    Back: There is no vertebral column tenderness.    Skin: The skin appears normal. There are no suspicious appearing rashes or lesions.    Extremities: The extremities are without deformity, cyanosis or edema.    Impression:   Ms. Asuncion Meier is a 54 year old woman presents with h/o recurrent right breast cancer   Cancer Staging   Malignant neoplasm of overlapping sites of right female breast (HCC)  Staging form: Breast, AJCC V7  - Clinical stage from 7/28/2016: Stage IA (T1b, N0, M0) - Signed by Lam Licona MD on 7/28/2016    Discussion and Plan:  I had a discussion with the Patient regarding her breast exam. On exam today I found her to be healing well since her surgery with no signs of new or recurrent disease. She does have some radiated related changes to the skin on that side.  She is no longer on any endocrine therapy though she is continue to follow with medical oncology out of [facility].  I have advised she talk to plastic surgery about her concerns related to the reconstruction. She will need no further mammograms status post bilateral mastectomies. I recommended she come to see me in 12 months for clinical exam.  Plan will be for a repeat right breast ultrasound to ensure her postoperative changes are stable.  She was given ample opportunity for questions and those questions were answered to her satisfaction. She has been  encouraged to contact the office with any questions or concerns prior to her next appointment.     This encounter lasted a total of 25 minutes, more than 50% of which was dedicated to the discussion of management options.

## 2024-09-17 ENCOUNTER — PATIENT MESSAGE (OUTPATIENT)
Dept: INTERNAL MEDICINE CLINIC | Facility: CLINIC | Age: 54
End: 2024-09-17

## 2024-09-18 ENCOUNTER — TELEMEDICINE (OUTPATIENT)
Dept: INTERNAL MEDICINE CLINIC | Facility: CLINIC | Age: 54
End: 2024-09-18
Payer: COMMERCIAL

## 2024-09-18 DIAGNOSIS — U07.1 COVID-19 VIRUS INFECTION: Primary | ICD-10-CM

## 2024-09-18 PROCEDURE — 99213 OFFICE O/P EST LOW 20 MIN: CPT | Performed by: INTERNAL MEDICINE

## 2024-09-18 NOTE — TELEPHONE ENCOUNTER
From: Asuncion Meier  To: Vera Fleming  Sent: 9/17/2024 7:26 PM CDT  Subject: Paxlovid request    Hi, Dr. Fleming,    I’m hoping to get a prescription for paxlovid. I tested positive today for Covid but have been experiencing symptoms since Saturday. Wondering if my age and breast cancer history can be considered high risk.     Thank you

## 2024-09-18 NOTE — PROGRESS NOTES
This is a telemedicine visit with live, interactive video and audio.     Patient understands and accepts financial responsibility for any deductible, co-insurance and/or co-pays associated with this service.    SUBJECTIVE  She started having symptoms of sore throat on Friday, 5 days ago. Now with congestion. Cough was mild and has not been a problem today. She has lost her sense of taste and smell. Very fatigued and spending most of her day in bed. Denies fever or shortness of breath. Tested positive for covid-19 yesterday    HISTORY:  Past Medical History:    Abdominal hernia    Anxiety state, unspecified    Back pain    Breast cancer (HCC)    right breast- diagnosed 2016, bilateral mastectomy    Cancer (HCC)    Recurring now    COVID    No hospitalization. Had HA,congestion, fatigue & cough    Decorative tattoo    Depression    Dizziness    Exposure to medical diagnostic radiation    Feeling lonely    Headache disorder    Heart palpitations    Hemorrhoids    Hypothyroidism    Sleep disturbance    Stress    Visual impairment    glasses      Past Surgical History:   Procedure Laterality Date    Colonoscopy      At 51    Hernia surgery      umbilical hernia surgery again a few months ago    Implant left      2016 removal of expanders and implants placed    Implant right      Mastectomy left      Mastectomy right        ,    Other  ORAL SURGERY    Other surgical history Right 2016    Procedure: BREAST SENTINEL LYMPH NODE BIOPSY;  Surgeon: Nicho Zhang MD;  Location:  [facility] OR    Other surgical history Bilateral 2016    Procedure: BREAST RECONSTRUCTION/ IMMEDIATE/EXPANDER;  Surgeon: Sundar Hawk MD;  Location:  [facility] OR    Other surgical history Bilateral     implants exchange    Other surgical history Right     mass removed after mastectomy    Umbilical hernia repair  2014    Procedure: HERNIA UMBILICAL REPAIR ADULT;  Surgeon: Joan Parra MD;  Location:  [facility] OR       Family History   Problem Relation Age of Onset    Lipids Father     Heart Disorder Maternal Grandmother         CHF at age 95    Other (Other) Maternal Grandmother     Heart Attack Maternal Grandfather         76 yrs    Other (Other) Paternal Grandmother     Other (Other) Paternal Grandfather     Cancer Maternal Uncle         Prostate, agent orange    Anxiety Maternal Uncle     Stroke Maternal Uncle         40’s      Social History     Socioeconomic History    Marital status:     Number of children: 2   Tobacco Use    Smoking status: Former     Current packs/day: 0.00     Types: Cigarettes     Quit date: 1995     Years since quittin.0    Smokeless tobacco: Never    Tobacco comments:     social smoker in [employer] for a few years   Vaping Use    Vaping status: Never Used   Substance and Sexual Activity    Alcohol use: Yes     Alcohol/week: 5.0 standard drinks of alcohol     Types: 5 Glasses of wine per week     Comment: 5 drinks/week last drink was wine \"last night\"    Drug use: Not Currently     Types: Cannabis     Comment: smoking 3-4  times per week last use \"last night\"    Sexual activity: Yes     Partners: Male   Other Topics Concern    Caffeine Concern No    Exercise No    Seat Belt No    Special Diet No    Stress Concern Yes    Weight Concern No   Social History Narrative    , lives with  and 2 dtrs        Allergies   Allergen Reactions    Gluten Flour PAIN      Current Outpatient Medications   Medication Sig Dispense Refill    nirmatrelvir-ritonavir 300-100 MG Oral Tablet Therapy Pack Take two nirmatrelvir tablets (300mg) with one ritonavir tablet (100mg) together twice daily for 5 days. 30 tablet 0    Cholecalciferol 125 MCG (5000 UT) Oral Tab Take 5,000 Int'l Units/day by mouth.      minoxidil 2.5 MG Oral Tab Take 0.5 tablets (1.25 mg total) by mouth daily.      mirtazapine (REMERON) 15 MG Oral Tab Take 1 tablet (15 mg total) by mouth nightly. 30 tablet 1    FLUoxetine  (PROZAC) 20 MG Oral Cap Take 1 capsule (20 mg total) by mouth every morning. 30 capsule 1    lamoTRIgine 100 MG Oral Tab Take 1 tablet (100 mg total) by mouth daily. 30 tablet 1    PATIENT SUPPLIED MEDICATION Take 1 mg by mouth daily. Compounded T3&T4      Misc Natural Products (DIM-PLUS OR) Take 1 mg by mouth daily.      zinc sulfate 220 (50 Zn) MG Oral Cap Take 1 capsule (220 mg total) by mouth daily.      vitamin E 100 UNITS Oral Cap Take 1 capsule (100 Units total) by mouth daily.      Ferrous Fumarate-Vitamin C ER 65-25 MG Oral Tab CR Take 1 tablet by mouth daily. 90 tablet 1    FOLIC ACID OR Take by mouth daily.      Cholecalciferol (VITAMIN D-3 OR) Take 5,000 Int'l Units/day by mouth daily.      Ascorbic Acid (VITAMIN C) 100 MG Oral Tab Take 1 tablet (100 mg total) by mouth daily.      B COMPLEX VITAMINS OR Take by mouth daily.         OBJECTIVE  Physical Exam:   No acute distress. Alert and oriented. No increased work of breathing.     ASSESSMENT & PLAN  Diagnoses and all orders for this visit:    COVID-19 virus infection - we discussed potential side effects and that paxlovid can lessen the duration and severity of covid infection. She would like to take. We discussed potential side effects. We discussed paxlovid can make mirtazepine serum concentration higher so instructed her to cut the mirtazepine dose in half while on paxlovid. Also discussed that paxlovid can make the lamotrigine less effective/dec serum concentration. Encouraged her to discuss with her psychiatrist.     Other orders  -     nirmatrelvir-ritonavir 300-100 MG Oral Tablet Therapy Pack; Take two nirmatrelvir tablets (300mg) with one ritonavir tablet (100mg) together twice daily for 5 days.    Nga Peters MD

## 2024-09-18 NOTE — TELEPHONE ENCOUNTER
Spoke with patient in regards to positive COVID test. Pt is experiencing congestion, fatigue, cough, headache, ringing in ears, and can't taste or smell. Symptoms started as early as Friday, took a COVID test on Saturday (negative), re-tested COVID yesterday 9/17 and it came back positive. Pt hasn't been taking anything OTC, except for ibuprofen. Pt has been pushing fluids and resting.     This RN recommended to stay hydrated and to rest, if symptoms worsen to go to [facility]. Pt v/u.     SV: Pt is on day 5 of symptom onset, recommend VV or OK to order paxlovid or alternative? Please advise, TY!

## 2025-06-04 ENCOUNTER — OFFICE VISIT (OUTPATIENT)
Dept: SURGERY | Facility: CLINIC | Age: 55
End: 2025-06-04
Payer: COMMERCIAL

## 2025-06-04 DIAGNOSIS — Z90.13 ACQUIRED ABSENCE OF BILATERAL BREASTS AND NIPPLES: Primary | ICD-10-CM

## 2025-06-04 PROCEDURE — 99213 OFFICE O/P EST LOW 20 MIN: CPT | Performed by: SURGERY

## 2025-06-04 NOTE — CONSULTS
Estabilshed Patient Consultation    Chief Complaint: absence of both breasts     History of Present Illness:   Asuncion Meier is a 54 year old female who returns to the office s/p right breast implant removal and capsulectomy, right pectoralis major muscle flap repositioning and change into prepectoral pocket, placement of new silicone implant (Allergan Natrelle Inspira style SSX Soft Touch 545 mL), placement of ADM on 10/24/2023     She is also s/p bilateral breast implant removal, replacement with permanent implant (Natrelle Inspira Allergan soft touch breast implant silicone style  mL), capsulotomy and partial capsulectomy right breast, capsulorrhaphy left breast on 2/21/2023.       She has a history of bilateral mastectomy and implant based reconstruction with Dr. Hawk (textured recalled implants)  She then underwent revision procedure with me in 2020, bilateral breast reconstruction revision with removal of bilateral intact recalled anatomic shaped implants, capsulorrhaphy and capsulotomy, placement of permanent implants (Natrelle Inspira Soft Touch style  mL) and fat grafting on 8/17/2020.  She then was diagnosed with recurrent right breast cancer and underwent excision of right breast cancer recurrence by Dr. Short and wound closure with me on 7/6/2021.  She received radiation therapy and completed this on 10/14/2021.      She is interested in downsizing her implants and is here today to discuss options.       Past Medical History:      Past Medical History[1]      Past Surgical History:  Past Surgical History[2]      Medications:    Current Medications[3]      Allergies:    Allergies[4]      Family History:   Family History[5]      Social History:  History   Alcohol Use    5.0 standard drinks of alcohol/week    5 Glasses of wine per week     Comment: 5 drinks/week last drink was wine \"last night\"       History   Smoking Status    Former    Types: Cigarettes   Smokeless Tobacco    Never        History   Drug Use Unknown     Comment: smoking 3-4  times per week last use \"last night\"         Review of Systems:    The patient reports: see HPI  All other systems are unremarkable.      Physical Exam:    There were no vitals taken for this visit.    Constitutional: The patient is an alert, oriented and well-developed.     Neurologic: Speech patterns and movements are normal.     Psychiatric: Affect is appropriate.    Eyes: Conjunctiva are clear, non-icteric. PERRL    ENT: no obvious abnormality, no ear drainage, mucous membranes moist and pink    Integument/Skin: The skin appears normal. There are no suspicious appearing rashes or lesions.    Breasts: no masses, bilateral breast implants in place, indentation along right medial breast, L breast slightly larger than right    Respiratory: Normal respiratory effort.     Cardiovascular: no cyanosis, no edema    Gastrointestinal: very limited abdominal donor site tissue     Musculoskeletal: Extremities unremarkable, without edema, tenderness or deformities      Impression:   Asuncino Meier  is a 54 year old with absence of both breasts s/p bilateral mastectomy and implant based reconstruction    Discussion and Plan:  The patient was counseled on the different treatment options.     She feels her bilateral breast implants are too large.    She is interested autologous reconstruction. She has very limited abdominal donor tissue. She has some tissue on her thighs.     She does not want to go flat at this time.     Therefore, we discussed the option for discussion with Dr. Margo Payne at [place] for possible alternative autologous breast reconstructive options, plus or minus an implant. She was given information for this today.     25 minutes were spent with the patient, from which 20 minutes were spent counseling the patient and coordinating care.    The 21st Century Cures Act makes medical notes like these available to patients in the interest of transparency.  Please be advised this is a medical document. Medical documents are intended to carry relevant information, facts as evident, and the clinical opinion of the practitioner. The medical note is intended as peer to peer communication and may appear blunt or direct. It is written in medical language and may contain abbreviations or verbiage that are unfamiliar.          [1]   Past Medical History:   Abdominal hernia    Anxiety state, unspecified    Back pain    Breast cancer (HCC)    right breast- diagnosed 2016, bilateral mastectomy    Cancer (HCC)    Recurring now    COVID    No hospitalization. Had HA,congestion, fatigue & cough    Decorative tattoo    Depression    Dizziness    Exposure to medical diagnostic radiation    Feeling lonely    Headache disorder    Heart palpitations    Hemorrhoids    Hypothyroidism    Sleep disturbance    Stress    Visual impairment    glasses   [2]   Past Surgical History:  Procedure Laterality Date    Colonoscopy      At 51    Hernia surgery      umbilical hernia surgery again a few months ago    Implant left      2016 removal of expanders and implants placed    Implant right      Mastectomy left      Mastectomy right        ,    Other  ORAL SURGERY    Other surgical history Right 2016    Procedure: BREAST SENTINEL LYMPH NODE BIOPSY;  Surgeon: Nicho Zhang MD;  Location:  MAIN OR    Other surgical history Bilateral 2016    Procedure: BREAST RECONSTRUCTION/ IMMEDIATE/EXPANDER;  Surgeon: Sundar Hawk MD;  Location:  MAIN OR    Other surgical history Bilateral     implants exchange    Other surgical history Right     mass removed after mastectomy    Umbilical hernia repair  2014    Procedure: HERNIA UMBILICAL REPAIR ADULT;  Surgeon: Joan Parra MD;  Location:  MAIN OR   [3]   No outpatient medications have been marked as taking for the 25 encounter (Appointment) with Amanda Monteiro MD.   [4]   Allergies  Allergen Reactions     Gluten Flour PAIN   [5]   Family History  Problem Relation Age of Onset    Lipids Father     Heart Disorder Maternal Grandmother         CHF at age 95    Other (Other) Maternal Grandmother     Heart Attack Maternal Grandfather         76 yrs    Other (Other) Paternal Grandmother     Other (Other) Paternal Grandfather     Cancer Maternal Uncle         Prostate, agent orange    Anxiety Maternal Uncle     Stroke Maternal Uncle         40’s

## (undated) DEVICE — SUTURE MONOCRYL 4-0 PS-2

## (undated) DEVICE — SUPER SPONGES,MEDIUM: Brand: KERLIX

## (undated) DEVICE — SIZER BREAST IMPLANT FP 520CC

## (undated) DEVICE — SOLUTION IRRIG 1000ML 0.9% NACL USP BTL

## (undated) DEVICE — STANDARD HYPODERMIC NEEDLE,POLYPROPYLENE HUB: Brand: MONOJECT

## (undated) DEVICE — STERILE POLYISOPRENE POWDER-FREE SURGICAL GLOVES: Brand: PROTEXIS

## (undated) DEVICE — LIGACLIP MCA MULTIPLE CLIP APPLIERS, 30 MEDIUM CLIPS: Brand: LIGACLIP

## (undated) DEVICE — CAUTERY BLADE 2IN INS E1455

## (undated) DEVICE — SUTURE ETHIBOND EXCEL 0 MO-7

## (undated) DEVICE — Device

## (undated) DEVICE — 3M™ STERI-STRIP™ REINFORCED ADHESIVE SKIN CLOSURES, R1548, 1 IN X 5 IN (25 MM X 125 MM), 4 STRIPS/ENVELOPE: Brand: 3M™ STERI-STRIP™

## (undated) DEVICE — 3M™ IOBAN™ 2 ANTIMICROBIAL INCISE DRAPE 6651EZ: Brand: IOBAN™ 2

## (undated) DEVICE — SUTURE VCRL SZ 4-0 L27IN ABSRB UD L17MM RB-1

## (undated) DEVICE — SUTURE VICRYL 0 CT-1

## (undated) DEVICE — DERMABOND LIQUID ADHESIVE

## (undated) DEVICE — 3M™ STERI-DRAPE™ INSTRUMENT POUCH 1018: Brand: STERI-DRAPE™

## (undated) DEVICE — SYRINGE MED 10ML LL CTRL W/ FNGR GRP CLR BRL

## (undated) DEVICE — MEGADYNE E-Z CLEAN BLADE 2.75"

## (undated) DEVICE — SOL NACL IRRIG 0.9% 1000ML BTL

## (undated) DEVICE — KENDALL SCD EXPRESS SLEEVES, KNEE LENGTH, MEDIUM: Brand: KENDALL SCD

## (undated) DEVICE — PLASTIC BREAST CDS-LF: Brand: MEDLINE INDUSTRIES, INC.

## (undated) DEVICE — PROXIMATE RH ROTATING HEAD SKIN STAPLERS (35 WIDE) CONTAINS 35 STAINLESS STEEL STAPLES: Brand: PROXIMATE

## (undated) DEVICE — BREAST-HERNIA-PORT CDS-LF: Brand: MEDLINE INDUSTRIES, INC.

## (undated) DEVICE — SUTURE VICRYL 3-0 SH

## (undated) DEVICE — ADHESIVE SKIN TOP FOR WND CLSR DERMBND ADV

## (undated) DEVICE — Device: Brand: PLUMEPEN

## (undated) DEVICE — CLOSURE EXOFIN 1.0ML

## (undated) DEVICE — MARKER SKIN PREP RESIST STRL

## (undated) DEVICE — CABLE BPLR L12FT FLYING LD DISP

## (undated) DEVICE — 450 ML BOTTLE OF 0.05% CHLORHEXIDINE GLUCONATE IN 99.95% STERILE WATER FOR IRRIGATION, USP AND APPLICATOR.: Brand: IRRISEPT ANTIMICROBIAL WOUND LAVAGE

## (undated) DEVICE — SYRINGE 10ML LL CONTRL SYRINGE

## (undated) DEVICE — EXOFIN TISSUE ADHESIVE 1.0ML

## (undated) DEVICE — 3M™ STERI-STRIP™ REINFORCED ADHESIVE SKIN CLOSURES, R1547, 1/2 IN X 4 IN (12 MM X 100 MM), 6 STRIPS/ENVELOPE: Brand: 3M™ STERI-STRIP™

## (undated) DEVICE — SOL  .9 1000ML BTL

## (undated) DEVICE — SUTURE SILK 2-0 FS

## (undated) DEVICE — VIOLET BRAIDED (POLYGLACTIN 910), SYNTHETIC ABSORBABLE SUTURE: Brand: COATED VICRYL

## (undated) DEVICE — UNDERPAD 23X36 LIGHT ASBORB

## (undated) DEVICE — 3M™ TEGADERM™ TRANSPARENT FILM DRESSING FRAME STYLE, 1624W, US-VER, 100/CARTON 4 CARTONS/CASE: Brand: 3M™ TEGADERM™

## (undated) DEVICE — LIGHT HANDLE

## (undated) DEVICE — SYRINGE 50ML LL TIP

## (undated) DEVICE — 40580 - THE PINK PAD - ADVANCED TRENDELENBURG POSITIONING KIT: Brand: 40580 - THE PINK PAD - ADVANCED TRENDELENBURG POSITIONING KIT

## (undated) DEVICE — DRAPE PACK CHEST & U BAR

## (undated) DEVICE — USE ITEM #176901

## (undated) DEVICE — BANDAGE,GAUZE,BULKEE II,4.5"X4.1YD,STRL: Brand: MEDLINE

## (undated) DEVICE — SUTURE VCRL SZ 0 L27IN ABSRB UD L36MM CT-1

## (undated) DEVICE — GAUZE,SPONGE,FLUFF,6"X6.75",STRL,5/TRAY: Brand: MEDLINE

## (undated) DEVICE — DEVICE FAT TISSUE COLL REVOLVE

## (undated) DEVICE — SUTURE ETHILON 3-0 PS-1

## (undated) DEVICE — PEN SKIN MARKING REG TIP VIOLT

## (undated) DEVICE — CHLORAPREP 26ML APPLICATOR

## (undated) DEVICE — SYRINGE 3ML LL TIP

## (undated) DEVICE — TIP BOVIE 4" MEGADYNE

## (undated) DEVICE — 3M™ IOBAN™ 2 ANTIMICROBIAL INCISE DRAPE 6648EZ: Brand: IOBAN™ 2

## (undated) DEVICE — LAPAROTOMY SPONGE - RF AND X-RAY DETECTABLE PRE-WASHED: Brand: SITUATE

## (undated) DEVICE — SUT VICRYL 3-0 SH J416H

## (undated) DEVICE — DRAIN ROUND HUBLESS 15FR

## (undated) DEVICE — PROXIMATE SKIN STAPLERS (35 WIDE) CONTAINS 35 STAINLESS STEEL STAPLES (FIXED HEAD): Brand: PROXIMATE

## (undated) DEVICE — TOWEL SURG OR 17X30IN BLUE

## (undated) DEVICE — SLEEVE COMPR M KNEE LEN SGL USE KENDALL SCD

## (undated) DEVICE — SCD SLEEVE KNEE HI BLEND

## (undated) DEVICE — SUTURE VCRL SZ 3-0 L27IN ABSRB UD L26MM SH

## (undated) DEVICE — BULB SYRINGE,IRRIGATION WITH PROTECTIVE CAP: Brand: DOVER

## (undated) DEVICE — 1010 S-DRAPE TOWEL DRAPE 10/BX: Brand: STERI-DRAPE™

## (undated) DEVICE — DRAIN RELIAVAC 100CC

## (undated) DEVICE — BANDAGE ROLL,100% COTTON, 6 PLY, LARGE: Brand: KERLIX

## (undated) DEVICE — SUTURE MCRYL SZ 4-0 L27IN ABSRB UD L19MM PS-2

## (undated) DEVICE — E-Z CLEAN, NON-STICK, PTFE COATED, ELECTROSURGICAL BLADE ELECTRODE, MODIFIED EXTENDED INSULATION, 4 INCH (10.2 CM): Brand: MEGADYNE

## (undated) DEVICE — HEMOCLIP HORIZON SM 001200

## (undated) DEVICE — 3M(TM) TEGADERM(TM) TRANSPARENT FILM DRESSING FRAME STYLE 9505W: Brand: 3M™ TEGADERM™

## (undated) DEVICE — DRAPE,TAPE STRIPS,STERILE: Brand: MEDLINE

## (undated) DEVICE — ELECTRODE ES L2.75IN XLN STD BLDE MOD E-Z CLN

## (undated) DEVICE — GOWN,SIRUS,FABRIC-REINFORCED,LARGE: Brand: MEDLINE

## (undated) DEVICE — SUTURE VICRYL 3-0

## (undated) DEVICE — RETRACTOR SUR WIDE FLAT LP LTWT PHOTONGUIDE

## (undated) DEVICE — DRAPE UTIL L W18XL24IN PLAS STR ADH REINF

## (undated) DEVICE — HEMOCLIP HORIZON MED 002200

## (undated) DEVICE — BRA SURGICAL ELIZABETH PINK L

## (undated) DEVICE — STERILE SYNTHETIC POLYISOPRENE POWDER-FREE SURGICAL GLOVES WITH HYDROGEL COATING: Brand: PROTEXIS

## (undated) DEVICE — TOWEL OR BLU 16X26 STRL

## (undated) DEVICE — UNDYED BRAIDED (POLYGLACTIN 910), SYNTHETIC ABSORBABLE SUTURE: Brand: COATED VICRYL

## (undated) DEVICE — SLEEVE KENDALL SCD EXPRESS MED

## (undated) DEVICE — SUT VICRYL 0 CT-1 J260H

## (undated) DEVICE — SUTURE PDS II 2-0 SH

## (undated) DEVICE — ABDOMINAL PAD: Brand: DERMACEA

## (undated) DEVICE — BLADE ELECTROSURG 4IN INSULATE

## (undated) DEVICE — SUT MONOCRYL 4-0 PS-2 Y426H

## (undated) NOTE — LETTER
10/14/21        [facility]  [facility] [address] [facility] [address]      Dear Get [place]inas,    [facility] records indicate that you have outstanding lab work and or testing that was ordered for you and has not yet been completed:  Orders Placed This Encounter      Bas

## (undated) NOTE — LETTER
19    Patient: Jarod Haque  : 1970 Visit date: 2019    Dear  Dr. Shannan Trevino,    Thank you for referring Jarod Haque to my practice. Please find my assessment and plan below.              History of Present Illness    72-year-old female

## (undated) NOTE — LETTER
Patient Name: Everlene Rubinstein  YOB: 1970          MRN number:  [id number]  Date:  9/27/2017  Referring Physician:  Linden Tolbert     Discharge Summary    Pt has attended 3/3, cancelled 0, and no shown 0 visits in Occupational Therapy fr reduced with her approach and pt reports no increase in volume or symptoms of lymphedema following her yoga and cardio classes and is wearing her sleeve following the classes for risk reduction, discussed that pt's current approach is appropriate at this t

## (undated) NOTE — LETTER
[facility] [address], [address] [place] [address]    Consent for Anesthesia   1.    [place] agree to be cared for by a physician anesthesiologist alone and/or with a nurse anesthetist, who is specially trained to monitor me and give me m allergic reactions to medications, injury to my airway, heart, lungs, vision, nerves, or muscles and in extremely rare instances death. 5. My doctor has explained to me other choices available to me for my care (alternatives).   6. Pregnant Patients (“epid Printed: 8/17/2020 at 9:56 AM    Medical Record #: [id number]                                            Page 1 of 1

## (undated) NOTE — LETTER
05/26/22        Onur Jared  [address] [facility]      Dear Kirsten Martinez,    [address] records indicate that you have outstanding lab work and or testing that was ordered for you and has not yet been completed:  Orders Placed This Encounter      Vitamin B12 [E]      Iron And Tibc [E]      Ferritin [E]      Folic Acid Serum [E]      Vitamin D, 25-Hydroxy    To provide you with the best possible care, please complete these orders at your earliest convenience. If you have recently completed these orders please disregard this letter. If you have any questions please call the office at Dept: [phone].      Thank you,       iKm Martinez MD

## (undated) NOTE — LETTER
[facility]  [facility], [address] [address]  [facility]: [phone]  FAX: [address] [place] [address] Clearance Request    Nhi Garcia MD  [address] [facility]  [address] [place] [id number]  Via In [facility]

## (undated) NOTE — LETTER
[address]ilar Elias [address]  [address] [facility] [address], [address] [facility]  Authorization for Surgical Operation and Procedure     Date:___________                                                                                                         Time:__________ products. Further, I understand that despite careful testing and screening of blood or blood products by collecting agencies, I may still be subject to ill effects as a result of receiving a blood transfusion and/or blood products.   The following are some 9. If I have a Do Not Attempt Resuscitation (DNAR) order in place, that status will be suspended while in the operating room, procedural suite, and during the recovery period unless otherwise explicitly stated by me (or a person authorized to consent on my keep me comfortable during my procedure    I understand that my anesthesiologist is not an employee or agent of [facility] or [facility].  He or she works for [employer], [employer].    2. As the patient asking for anesthesia service I understand that the risks of having an epidural (medicine given into my back to help control pain during labor), include itching, low blood pressure, difficulty urinating, headache or slowing of the baby’s heart.  Very rare risks include infection, bleedi

## (undated) NOTE — MR AVS SNAPSHOT
After Visit Summary   4/17/2017    Merrick العلي    MRN: [id number]           Allergies     No Known Allergies      Your Vital Signs Were     BP Pulse Temp(Src) Resp    120/72 mmHg 69 97.1 °F (36.2 °C) (Tympanic) 18    Height Weight BMI SpO2    1.7

## (undated) NOTE — MR AVS SNAPSHOT
After Visit Summary   1/9/2017    Destinee Curtis    MRN: [id number]           Allergies     No Known Allergies      Your Vital Signs Were     BP Pulse Temp(Src) Resp    111/56 mmHg 77 97.6 °F (36.4 °C) (Tympanic) 18    Height Weight BMI SpO2    1.72